# Patient Record
Sex: MALE | Race: WHITE | NOT HISPANIC OR LATINO | Employment: OTHER | URBAN - METROPOLITAN AREA
[De-identification: names, ages, dates, MRNs, and addresses within clinical notes are randomized per-mention and may not be internally consistent; named-entity substitution may affect disease eponyms.]

---

## 2017-02-14 ENCOUNTER — OFFICE VISIT (OUTPATIENT)
Dept: SURGERY | Facility: CLINIC | Age: 67
End: 2017-02-14

## 2017-02-14 VITALS — WEIGHT: 232.8 LBS | HEIGHT: 71 IN | BODY MASS INDEX: 32.59 KG/M2 | OXYGEN SATURATION: 98 % | HEART RATE: 68 BPM

## 2017-02-14 DIAGNOSIS — Z12.11 SCREENING FOR COLON CANCER: Primary | ICD-10-CM

## 2017-02-14 PROCEDURE — 99203 OFFICE O/P NEW LOW 30 MIN: CPT | Performed by: SURGERY

## 2017-02-23 NOTE — PROGRESS NOTES
CHIEF COMPLAINT:    Referred for screening colonoscopy.    HISTORY OF PRESENT ILLNESS:    Anibal Freedman is a 67 y.o. male who called the office to arrange for screening colonoscopy.  I reviewed his information packet and asked him to come see me for an appointment.    His last colonoscopy was performed by Dr. Mildred You.  The sigmoid colon biopsy was performed that showed vascular congestion.  There was a distal sigmoid polyp that was removed.  The pathology showed tubular adenoma with mild dysplasia.  He has abdominal distention and bloating.  There is no nausea.  There is no vomiting.  There is no blood in the stool.  There is no melena.  There is no diarrhea or constipation.    He has lower extremity swelling and shortness of breath.  There is no recent chest pain.    He has hypertension, coronary artery disease, congestive heart failure, and chronic atrial fibrillation.  He normally sees Dr. Tank Baumann.  He frequently comes to [facility].  When he does, usually sees Dr. Epifanio May.  He had a left heart catheterization on 8/2/2016.  He has moderate pulmonary hypertension with PA pressure 5525.  There is proximal and mid disease of about 50-60% in the RCA  There is 50% stenosis of the LAD.  The left main coronary artery is normal.    Past Medical History   Diagnosis Date   • Alcoholism    • Arthritis    • Atrial fibrillation      pt reported   • Cellulitis    • CHF (congestive heart failure)    • Colon polyps 08/21/2006     Colonoscopy w/ snare cautery, polypectomy & biopsy, PATH:  Sigmoid Colon Biopsy: focal vascular congestion & evidence of recent hemorrhage, non-specific.  Recto-Sigmoid Colon Biopsy: Fragments of tubular adenoam w/ mild dysplasia. Dr. Mildred You   • History of coronary angioplasty    • History of MRSA infection      pt reported   • History of staph infection 08/02/2005   • Hypertension    • Infectious viral hepatitis      pt reported   • Prostate cancer 04/28/2010      S/P Radical Prostectomy, Adenocarcinoma, Primary Yissel Grade 3, Secondar [place]re[place] Grade 3. Combined yissel score 6. Tumor involves rt & lt lobes, negative capsular margin, no invasion of seminal vesicles, no identified perineural invastion   • Withdrawal symptoms, alcohol        Past Surgical History   Procedure Laterality Date   • Appendectomy     • Coronary angioplasty with stent placement       x 2    • Joint replacement     • Skin biopsy     • Tonsillectomy     • Cardiac catheterization N/A 8/2/2016     Procedure: Left Heart Cath   ?right cath too;  Surgeon: Epifanio May MD;  Location:  [facility];  Service:    • Cardiac catheterization N/A 8/2/2016     Procedure: Coronary angiography;  Surgeon: Epifanio May MD;  Location:  [facility];  Service:    • Cardiac catheterization N/A 8/2/2016     Procedure: Left ventriculography;  Surgeon: Epifanio May MD;  Location:  [facility];  Service:    • Cardiac catheterization N/A 8/2/2016     Procedure: Right Heart Cath;  Surgeon: Epifanio May MD;  [facility];  Service:    • Cardiac ablation Right 01/18/2013     Atrial Flutter Ablation, Dr. Otis Srinivasan   • Cystotomy N/A 09/23/2011     Laparoscopic closure of incidental cystotomy, Laparoscopic incision of pelvic lymphocele, Dr. Jaime Royal   • Cystoscopy N/A 04/28/2010     Cystoscopy w/ transurethral incision of the bladder neck, Dr. ANDREW Cordova   • Total knee arthroplasty Left 03/25/2008     Left total knee arthroplasty w/ [place] pinless computer navigation, Dr. Ashok Crocker   • Colonoscopy w/ biopsies and polypectomy N/A 08/21/2006     Colonoscopy w/ snare cautery, polypectomy & biopsy, PATH:  Sigmoid Colon Biopsy: focal vascular congestion & evidence of recent hemorrhage, non-specific.  Recto-Sigmoid Colon Biopsy: Fragments of tubular adenoam w/ mild dysplasia. Dr. Mildred You   •  Prostatectomy N/A 04/28/2010     Adenocarcinoma, Primary [place] Grade 3, Secondar Yissel Grade 3. Combined yissel score 6. Tumor involves rt & lt lobes, negative capsular margin, no invasion of seminal vesicles, no identified perineural invastion, Dr. Jaime Royal   • Leg debridement Left 08/19/2005     Left Thigh, Debridement skin & subcutaneous tissue muscle w/ closure via advancement flaps, Dr. Mildred You   • Wound debridement Left 08/02/2005     Left Buttocks & Left thigh wounds, Debridement of left buttocks & left thigh wounds, Dr. Mildred You   • Leg debridement Left 08/08/2005     Debridement of left anterior thigh, Dr. Mildred You         Current Outpatient Prescriptions:   •  allopurinol (ZYLOPRIM) 300 MG tablet, Take 300 mg by mouth Daily., Disp: , Rfl:   •  apixaban (ELIQUIS) 5 MG tablet tablet, Take 5 mg by mouth 2 (two) times a day., Disp: , Rfl:   •  bumetanide (BUMEX) 2 MG tablet, Take 2 mg by mouth 2 (two) times a day., Disp: , Rfl:   •  ferrous sulfate 325 (65 FE) MG tablet, Take 325 mg by mouth Daily With Breakfast., Disp: , Rfl:   •  FLUoxetine (PROzac) 20 MG capsule, Take 20 mg by mouth daily., Disp: , Rfl:   •  hydrOXYzine (ATARAX) 25 MG tablet, Take 25 mg by mouth every 8 (eight) hours as needed for itching., Disp: , Rfl:   •  isosorbide mononitrate (IMDUR) 60 MG 24 hr tablet, Take 1 tablet by mouth daily., Disp: 30 tablet, Rfl: 0  •  magnesium oxide (MAG-OX) 400 MG tablet, Take 400 mg by mouth daily., Disp: , Rfl:   •  metoprolol tartrate (LOPRESSOR) 25 MG tablet, Take 1 tablet by mouth every 12 (twelve) hours., Disp: 60 tablet, Rfl: 0  •  pantoprazole (PROTONIX) 40 MG EC tablet, Take 40 mg by mouth daily., Disp: , Rfl:   •  potassium chloride (K-DUR,KLOR-CON) 20 MEQ CR tablet, Take 20 mEq by mouth 2 (Two) Times a Day., Disp: , Rfl:   •  predniSONE (DELTASONE) 10 MG tablet, Take 1 tablet by mouth daily with breakfast., Disp: 3 tablet, Rfl: 0    No Known Allergies    Family  "History   Problem Relation Age of Onset   • Heart disease Mother    • Alcohol abuse Father    • Liver cancer Father    • Cancer Sister    • Hypertension Brother    • Alcohol abuse Brother    • Skin cancer Brother        Social History     Social History   • Marital status:      Spouse name: N/A   • Number of children: N/A   • Years of education: N/A     Occupational History   • Not on file.     Social History Main Topics   • Smoking status: Former Smoker     Packs/day: 1.50   • Smokeless tobacco: Not on file   • Alcohol use 3.6 oz/week     6 Cans of beer per week      Comment: 1 pint a day OR MORE OF VODKA   • Drug use: No   • Sexual activity: Not Currently     Partners: Female     Other Topics Concern   • Not on file     Social History Narrative       Review of Systems   Respiratory: Positive for shortness of breath.    Cardiovascular: Positive for leg swelling.   Genitourinary: Positive for urgency.   Musculoskeletal: Positive for arthralgias and myalgias.   Allergic/Immunologic:        History of MRSA in 2005   All other systems reviewed and are negative.      Objective     Visit Vitals   • Pulse 68   • Ht 71\" (180.3 cm)   • Wt 232 lb 12.8 oz (106 kg)   • SpO2 98%   • BMI 32.47 kg/m2       Physical Exam   Constitutional: He is oriented to person, place, and time. He appears well-developed and well-nourished. No distress.   HENT:   Head: Normocephalic and atraumatic.   Eyes: Conjunctivae are normal. No scleral icterus.   Neck: JVD present.   Cardiovascular: Normal rate, regular rhythm, normal heart sounds and intact distal pulses.  Exam reveals no gallop.    No murmur heard.  Pulmonary/Chest: No respiratory distress. He has wheezes. He has no rales.   Respiratory effort is labored.  He appears short of breath with normal conversation.   Abdominal: Soft. Bowel sounds are normal. He exhibits no distension. There is no tenderness. There is no guarding.   Musculoskeletal: He exhibits edema. He exhibits no " deformity.   Neurological: He is alert and oriented to person, place, and time.   Skin: Skin is warm and dry.   Psychiatric: He has a normal mood and affect.   Nursing note and vitals reviewed.    DIAGNOSTIC DATA:    I reviewed an echocardiogram that was performed on 7/24 2016.  It shows an ejection fraction of 55.9%.  There was normal wall contractility.  I reviewed the report of a stress test performed on 7/27/2016 that was abnormal.  It shows an area of reversible ischemia involving the lateral wall.  I reviewed the report of a cardiac catheterization performed on 8/2/2016.  It shows moderate pulmonary hypertension with PA pressure 55/25.  There is proximal and mid disease of about 50-60% in the RCA  There is 50% stenosis of the LAD.  The left main coronary artery is normal.  Medical management is recommended.    ASSESSMENT:    Normal risk colon cancer screening.  Negative family history for colorectal cancer.  Prior history of removal of an adenomatous polyp of the sigmoid colon.    PLAN:    He has an appointment coming up to see his cardiologist.  Patient and I discussed the following: Colonoscopy, including polypectomy is a relatively low risk procedure, but it does require sedation and a bowel prep.  Within the last year, he has undergone cardiac evaluation that describes intermediate risk coronary disease and moderate pulmonary hypertension.  In the office today he seems significantly short of breath.  I feel like the potential downside to screening colonoscopy outweighs the possible benefits. I have not recommended pursuing colonoscopy.  He is going to get an opinion from his cardiologist regarding this.  He will call me after his visit with Dr. Baumann. I might consider FIT or Cologuard.    ADDENDUM:    Clearanceletter from Dr Baumann. Will arrange colonoscopy.

## 2017-03-09 ENCOUNTER — LAB (OUTPATIENT)
Dept: LAB | Facility: HOSPITAL | Age: 67
End: 2017-03-09
Attending: INTERNAL MEDICINE

## 2017-03-09 ENCOUNTER — TRANSCRIBE ORDERS (OUTPATIENT)
Dept: ADMINISTRATIVE | Facility: HOSPITAL | Age: 67
End: 2017-03-09

## 2017-03-09 DIAGNOSIS — D63.1 ANEMIA OF CHRONIC RENAL FAILURE, UNSPECIFIED STAGE: ICD-10-CM

## 2017-03-09 DIAGNOSIS — N18.30 CHRONIC KIDNEY DISEASE, STAGE III (MODERATE) (HCC): ICD-10-CM

## 2017-03-09 DIAGNOSIS — N18.9 ANEMIA OF CHRONIC RENAL FAILURE, UNSPECIFIED STAGE: ICD-10-CM

## 2017-03-09 DIAGNOSIS — E61.1 IRON DEFICIENCY: ICD-10-CM

## 2017-03-09 DIAGNOSIS — E55.9 VITAMIN D DEFICIENCY, UNSPECIFIED: ICD-10-CM

## 2017-03-09 DIAGNOSIS — M10.9 GOUT, UNSPECIFIED: Primary | ICD-10-CM

## 2017-03-09 DIAGNOSIS — M10.9 GOUT, UNSPECIFIED: ICD-10-CM

## 2017-03-09 LAB
25(OH)D3 SERPL-MCNC: 6.8 NG/ML (ref 30–100)
ANION GAP SERPL CALCULATED.3IONS-SCNC: 16.3 MMOL/L
BASOPHILS # BLD AUTO: 0.02 10*3/MM3 (ref 0–0.2)
BASOPHILS NFR BLD AUTO: 0.4 % (ref 0–1.5)
BILIRUB UR QL STRIP: NEGATIVE
BUN BLD-MCNC: 22 MG/DL (ref 8–23)
BUN/CREAT SERPL: 19.5 (ref 7–25)
CALCIUM SPEC-SCNC: 9.6 MG/DL (ref 8.6–10.5)
CHLORIDE SERPL-SCNC: 102 MMOL/L (ref 98–107)
CLARITY UR: CLEAR
CO2 SERPL-SCNC: 26.7 MMOL/L (ref 22–29)
COLOR UR: YELLOW
CREAT BLD-MCNC: 1.13 MG/DL (ref 0.76–1.27)
DEPRECATED RDW RBC AUTO: 56.9 FL (ref 37–54)
EOSINOPHIL # BLD AUTO: 0.06 10*3/MM3 (ref 0–0.7)
EOSINOPHIL NFR BLD AUTO: 1.1 % (ref 0.3–6.2)
ERYTHROCYTE [DISTWIDTH] IN BLOOD BY AUTOMATED COUNT: 16 % (ref 11.5–14.5)
FERRITIN SERPL-MCNC: 74.56 NG/ML (ref 30–400)
GFR SERPL CREATININE-BSD FRML MDRD: 65 ML/MIN/1.73
GLUCOSE BLD-MCNC: 108 MG/DL (ref 65–99)
GLUCOSE UR STRIP-MCNC: NEGATIVE MG/DL
HCT VFR BLD AUTO: 38.9 % (ref 40.4–52.2)
HGB BLD-MCNC: 12.6 G/DL (ref 13.7–17.6)
HGB UR QL STRIP.AUTO: NEGATIVE
IMM GRANULOCYTES # BLD: 0.02 10*3/MM3 (ref 0–0.03)
IMM GRANULOCYTES NFR BLD: 0.4 % (ref 0–0.5)
IRON 24H UR-MRATE: 141 MCG/DL (ref 59–158)
IRON SATN MFR SERPL: 40 % (ref 20–50)
KETONES UR QL STRIP: NEGATIVE
LEUKOCYTE ESTERASE UR QL STRIP.AUTO: NEGATIVE
LYMPHOCYTES # BLD AUTO: 0.65 10*3/MM3 (ref 0.9–4.8)
LYMPHOCYTES NFR BLD AUTO: 11.6 % (ref 19.6–45.3)
MAGNESIUM SERPL-MCNC: 1.6 MG/DL (ref 1.6–2.4)
MCH RBC QN AUTO: 31.4 PG (ref 27–32.7)
MCHC RBC AUTO-ENTMCNC: 32.4 G/DL (ref 32.6–36.4)
MCV RBC AUTO: 97 FL (ref 79.8–96.2)
MONOCYTES # BLD AUTO: 0.97 10*3/MM3 (ref 0.2–1.2)
MONOCYTES NFR BLD AUTO: 17.4 % (ref 5–12)
NEUTROPHILS # BLD AUTO: 3.86 10*3/MM3 (ref 1.9–8.1)
NEUTROPHILS NFR BLD AUTO: 69.1 % (ref 42.7–76)
NITRITE UR QL STRIP: NEGATIVE
PH UR STRIP.AUTO: 7 [PH] (ref 5–8)
PLATELET # BLD AUTO: 181 10*3/MM3 (ref 140–500)
PMV BLD AUTO: 12.2 FL (ref 6–12)
POTASSIUM BLD-SCNC: 4.5 MMOL/L (ref 3.5–5.2)
PROT UR QL STRIP: NEGATIVE
RBC # BLD AUTO: 4.01 10*6/MM3 (ref 4.6–6)
SODIUM BLD-SCNC: 145 MMOL/L (ref 136–145)
SP GR UR STRIP: <=1.005 (ref 1–1.03)
TIBC SERPL-MCNC: 352 MCG/DL (ref 298–536)
TRANSFERRIN SERPL-MCNC: 236 MG/DL (ref 200–360)
UROBILINOGEN UR QL STRIP: NORMAL
WBC NRBC COR # BLD: 5.58 10*3/MM3 (ref 4.5–10.7)

## 2017-03-09 PROCEDURE — 83540 ASSAY OF IRON: CPT

## 2017-03-09 PROCEDURE — 81003 URINALYSIS AUTO W/O SCOPE: CPT

## 2017-03-09 PROCEDURE — 36415 COLL VENOUS BLD VENIPUNCTURE: CPT

## 2017-03-09 PROCEDURE — 83735 ASSAY OF MAGNESIUM: CPT

## 2017-03-09 PROCEDURE — 84466 ASSAY OF TRANSFERRIN: CPT

## 2017-03-09 PROCEDURE — 82728 ASSAY OF FERRITIN: CPT

## 2017-03-09 PROCEDURE — 80048 BASIC METABOLIC PNL TOTAL CA: CPT

## 2017-03-09 PROCEDURE — 85025 COMPLETE CBC W/AUTO DIFF WBC: CPT

## 2017-03-09 PROCEDURE — 82306 VITAMIN D 25 HYDROXY: CPT

## 2017-03-15 ENCOUNTER — ANESTHESIA (OUTPATIENT)
Dept: GASTROENTEROLOGY | Facility: HOSPITAL | Age: 67
End: 2017-03-15

## 2017-03-15 ENCOUNTER — ANESTHESIA EVENT (OUTPATIENT)
Dept: GASTROENTEROLOGY | Facility: HOSPITAL | Age: 67
End: 2017-03-15

## 2017-03-15 ENCOUNTER — HOSPITAL ENCOUNTER (OUTPATIENT)
Facility: HOSPITAL | Age: 67
Setting detail: HOSPITAL OUTPATIENT SURGERY
Discharge: HOME | End: 2017-03-15
Attending: SURGERY | Admitting: SURGERY

## 2017-03-15 VITALS
HEIGHT: 71 IN | WEIGHT: 236.06 LBS | OXYGEN SATURATION: 92 % | TEMPERATURE: 97.7 F | DIASTOLIC BLOOD PRESSURE: 76 MMHG | BODY MASS INDEX: 33.05 KG/M2 | SYSTOLIC BLOOD PRESSURE: 141 MMHG | RESPIRATION RATE: 18 BRPM | HEART RATE: 79 BPM

## 2017-03-15 DIAGNOSIS — Z12.11 SCREENING FOR COLON CANCER: ICD-10-CM

## 2017-03-15 PROCEDURE — S0260 H&P FOR SURGERY: HCPCS | Performed by: SURGERY

## 2017-03-15 PROCEDURE — 88305 TISSUE EXAM BY PATHOLOGIST: CPT | Performed by: SURGERY

## 2017-03-15 PROCEDURE — 45380 COLONOSCOPY AND BIOPSY: CPT | Performed by: SURGERY

## 2017-03-15 PROCEDURE — 25010000002 PROPOFOL 10 MG/ML EMULSION: Performed by: ANESTHESIOLOGY

## 2017-03-15 RX ORDER — PROPOFOL 10 MG/ML
VIAL (ML) INTRAVENOUS CONTINUOUS PRN
Status: DISCONTINUED | OUTPATIENT
Start: 2017-03-15 | End: 2017-03-15 | Stop reason: SURG

## 2017-03-15 RX ORDER — LIDOCAINE HYDROCHLORIDE 20 MG/ML
INJECTION, SOLUTION INFILTRATION; PERINEURAL AS NEEDED
Status: DISCONTINUED | OUTPATIENT
Start: 2017-03-15 | End: 2017-03-15 | Stop reason: SURG

## 2017-03-15 RX ORDER — SODIUM CHLORIDE 0.9 % (FLUSH) 0.9 %
3 SYRINGE (ML) INJECTION AS NEEDED
Status: DISCONTINUED | OUTPATIENT
Start: 2017-03-15 | End: 2017-03-15 | Stop reason: HOSPADM

## 2017-03-15 RX ORDER — SODIUM CHLORIDE, SODIUM LACTATE, POTASSIUM CHLORIDE, CALCIUM CHLORIDE 600; 310; 30; 20 MG/100ML; MG/100ML; MG/100ML; MG/100ML
1000 INJECTION, SOLUTION INTRAVENOUS CONTINUOUS PRN
Status: DISCONTINUED | OUTPATIENT
Start: 2017-03-15 | End: 2017-03-15 | Stop reason: HOSPADM

## 2017-03-15 RX ORDER — LIDOCAINE HYDROCHLORIDE 10 MG/ML
0.5 INJECTION, SOLUTION INFILTRATION; PERINEURAL ONCE AS NEEDED
Status: DISCONTINUED | OUTPATIENT
Start: 2017-03-15 | End: 2017-03-15 | Stop reason: HOSPADM

## 2017-03-15 RX ORDER — PROPOFOL 10 MG/ML
VIAL (ML) INTRAVENOUS AS NEEDED
Status: DISCONTINUED | OUTPATIENT
Start: 2017-03-15 | End: 2017-03-15 | Stop reason: SURG

## 2017-03-15 RX ADMIN — LIDOCAINE HYDROCHLORIDE 60 MG: 20 INJECTION, SOLUTION INFILTRATION; PERINEURAL at 10:51

## 2017-03-15 RX ADMIN — SODIUM CHLORIDE, POTASSIUM CHLORIDE, SODIUM LACTATE AND CALCIUM CHLORIDE: 600; 310; 30; 20 INJECTION, SOLUTION INTRAVENOUS at 10:45

## 2017-03-15 RX ADMIN — SODIUM CHLORIDE, POTASSIUM CHLORIDE, SODIUM LACTATE AND CALCIUM CHLORIDE 1000 ML: 600; 310; 30; 20 INJECTION, SOLUTION INTRAVENOUS at 08:53

## 2017-03-15 RX ADMIN — PROPOFOL 150 MG: 10 INJECTION, EMULSION INTRAVENOUS at 10:51

## 2017-03-15 RX ADMIN — PROPOFOL 150 MCG/KG/MIN: 10 INJECTION, EMULSION INTRAVENOUS at 10:52

## 2017-03-15 NOTE — OP NOTE
Preoperative diagnosis:   Colon cancer screening.  History of polyps.  Postoperative diagnosis:   Ascending colon polyp.  Diverticulosis.  Procedure: Colonoscopy with biopsy.  Attending surgeon: Anibal Bower M.D.  Anesthesia: MAC.  Specimens: Colon polyp.  Blood loss: 5 mL.  Drains: None.  Bowel prep: Excellent.  Scope withdrawal time: 7:27.  Indications: Anibal Freedman is a 67 y.o. male who is asymptomatic, and has a negative immediate family history.  He has a history of prior colonoscopy requiring polypectomy. he presents for screening colonoscopy.    Description of procedure: he is taken to the endoscopy suite and positioned on the stretcher in the left lateral decubitus position.  After graduated amounts of propofol were administered, a digital rectal exam was performed.  It was normal.    The flexible colonoscope was inserted into the anus and advanced to the level of the cecum without difficulty. The appendiceal orifice was identified and photographed.   The ileocecal valve was identified.  It appeared normal.  The intraluminal surface of the colon was examined upon withdrawal scope.    During the exam, a small (3 mm) polyp was identified in the ascending colon.  It was removed with the cold biopsy forceps.  There is no residual tissue.  Hemostasis was spontaneous, and occurred within a few seconds.      The bowel prep was excellent.  There was some clear liquid within all segments of the colon that was easily evacuated with the suction channel the scope.  There were multiple diverticuli. Te diverticuli were present in all segments of the colon, but were much more numerous in the descending solon and sigmoid colon. There were no additional polyps.  There were no vascular malformations.  A retroflexed view in the rectum revealed no hemorrhoids.    He experienced oxygen desaturation noted on pulse oxymetry beginning at the point in which the ascending colon polyp was biopsied. The saturations decreased  to 65%, but returned to above 90% within a minute after changing supplemental oxygen to a facemask. He was returned to the recovery area in stable condition.    I expect to not arrange for further screening exams.

## 2017-03-15 NOTE — ANESTHESIA POSTPROCEDURE EVALUATION
Patient: Anibal Freedman    Procedure Summary     Date Anesthesia Start Anesthesia Stop Room / Location    03/15/17 1045 1127  [facility] /  [facility]       Procedure Diagnosis Surgeon Provider    COLONOSCOPY TO CECUM WITH COLD BIOPSY POLYECTOMY (N/A ) Screening for colon cancer  (Screening for colon cancer [Z12.11]) MD Donte Velazquez MD          Anesthesia Type: MAC  Last vitals  /71 (03/15/17 1141)    Temp      Pulse 84 (03/15/17 1141)   Resp 18 (03/15/17 1141)    SpO2 92 % (03/15/17 1141)      Post Anesthesia Care and Evaluation    Patient location during evaluation: PACU  Patient participation: complete - patient participated  Level of consciousness: awake and alert  Pain score: 0  Pain management: adequate  Airway patency: patent  Anesthetic complications: No anesthetic complications    Cardiovascular status: acceptable  Respiratory status: acceptable  Hydration status: acceptable

## 2017-03-15 NOTE — PLAN OF CARE
Problem: Patient Care Overview (Adult)  Goal: Plan of Care Review  Outcome: Ongoing (interventions implemented as appropriate)    03/15/17 0819   Coping/Psychosocial Response Interventions   Plan Of Care Reviewed With patient   Patient Care Overview   Progress improving       Goal: Adult Individualization and Mutuality  Outcome: Ongoing (interventions implemented as appropriate)  Goal: Discharge Needs Assessment  Outcome: Ongoing (interventions implemented as appropriate)    03/15/17 0819   Discharge Needs Assessment   Concerns To Be Addressed no discharge needs identified   Discharge Disposition home or self-care   Living Environment   Transportation Available car;family or friend will provide         Problem: GI Endoscopy (Adult)  Intervention: Monitor/Manage Procedure Recovery    03/15/17 0819   Respiratory Interventions   Airway/Ventilation Management airway patency maintained   Coping/Psychosocial Interventions   Environmental Support calm environment promoted   Activity   Activity Type activity adjusted per tolerance   Cardiac Interventions   Warming Thermoregulation Maintenance warm blankets applied       Intervention: Prevent [place]-procedural Injury    03/15/17 0819   Positioning   Positioning side lying, left   Head Of Bed (HOB) Position HOB elevated         Goal: Signs and Symptoms of Listed Potential Problems Will be Absent or Manageable (GI Endoscopy)  Outcome: Ongoing (interventions implemented as appropriate)    03/15/17 0819   GI Endoscopy   Problems Assessed (GI Endoscopy) all   Problems Present (GI Endoscopy) none

## 2017-03-15 NOTE — ANESTHESIA PREPROCEDURE EVALUATION
Anesthesia Evaluation     Patient summary reviewed and Nursing notes reviewed      Airway   Mallampati: II  TM distance: >3 FB  Neck ROM: full  no difficulty expected  Dental - normal exam     Pulmonary - normal exam   (+) a smoker Former,   Cardiovascular     Rhythm: irregular  Rate: normal    (+) hypertension, CAD, cardiac stents more than 12 months ago dysrhythmias Atrial Fib, CHF,     PE comment: Afib    Neuro/Psych- negative ROS  GI/Hepatic/Renal/Endo    (+) obesity,  hepatitis, liver disease,     Musculoskeletal (-) negative ROS    Abdominal  - normal exam    Bowel sounds: normal.   Substance History   (+) alcohol use,      OB/GYN negative ob/gyn ROS         Other                                    Anesthesia Plan    ASA 3     MAC     intravenous induction   Anesthetic plan and risks discussed with patient.

## 2017-03-15 NOTE — H&P
CHIEF COMPLAINT:     Referred for screening colonoscopy.     HISTORY OF PRESENT ILLNESS:     Anibal Freedman is a 67 y.o. male who called the office to arrange for screening colonoscopy. I reviewed his information packet and asked him to come see me for an appointment.     His last colonoscopy was performed by Dr. Mildred You. The sigmoid colon biopsy was performed that showed vascular congestion. There was a distal sigmoid polyp that was removed. The pathology showed tubular adenoma with mild dysplasia. He has abdominal distention and bloating. There is no nausea. There is no vomiting. There is no blood in the stool. There is no melena. There is no diarrhea or constipation.     He has lower extremity swelling and shortness of breath. There is no recent chest pain.     He has hypertension, coronary artery disease, congestive heart failure, and chronic atrial fibrillation. He normally sees Dr. Tank Baumann.  He frequently comes to [facility]. When he does, usually sees Dr. Epifanio May. He had a left heart catheterization on 8/2/2016. He has moderate pulmonary hypertension with PA pressure 5525. There is proximal and mid disease of about 50-60% in the RCA There is 50% stenosis of the LAD. The left main coronary artery is normal.      Medical History          Past Medical History   Diagnosis Date   • Alcoholism     • Arthritis     • Atrial fibrillation         pt reported   • Cellulitis     • CHF (congestive heart failure)     • Colon polyps 08/21/2006       Colonoscopy w/ snare cautery, polypectomy & biopsy, PATH: Sigmoid Colon Biopsy: focal vascular congestion & evidence of recent hemorrhage, non-specific. Recto-Sigmoid Colon Biopsy: Fragments of tubular adenoam w/ mild dysplasia. Dr. Mildred You   • History of coronary angioplasty     • History of MRSA infection         pt reported   • History of staph infection 08/02/2005   • Hypertension     • Infectious viral hepatitis         pt reported   •  Prostate cancer 04/28/2010       S/P Radical Prostectomy, Adenocarcinoma, Primary [place] Grade 3, Secondar Yissel Grade 3. Combined yissel score 6. Tumor involves rt & lt lobes, negative capsular margin, no invasion of seminal vesicles, no identified perineural invastion   • Withdrawal symptoms, alcohol               Surgical History           Past Surgical History   Procedure Laterality Date   • Appendectomy       • Coronary angioplasty with stent placement           x 2    • Joint replacement       • Skin biopsy       • Tonsillectomy       • Cardiac catheterization N/A 8/2/2016       Procedure: Left Heart Cath ?right cath too; Surgeon: Epifanio May MD; Location:  [facility]; Service:    • Cardiac catheterization N/A 8/2/2016       Procedure: Coronary angiography; Surgeon: Epifanio May MD; Location:  [facility]; Service:    • Cardiac catheterization N/A 8/2/2016       Procedure: Left ventriculography; Surgeon: Epifanio May MD; Location:  [facility]; Service:    • Cardiac catheterization N/A 8/2/2016       Procedure: Right Heart Cath; Surgeon: Epifanio May MD; Location:  [facility]; Service:    • Cardiac ablation Right 01/18/2013       Atrial Flutter Ablation, Dr. Otis Srinivasan   • Cystotomy N/A 09/23/2011       Laparoscopic closure of incidental cystotomy, Laparoscopic incision of pelvic lymphocele, Dr. Jaime Royal   • Cystoscopy N/A 04/28/2010       Cystoscopy w/ transurethral incision of the bladder neck, Dr. ANDREW Cordova   • Total knee arthroplasty Left 03/25/2008       Left total knee arthroplasty w/ [place] pinless computer navigation, Dr. Ashok Crocker   • Colonoscopy w/ biopsies and polypectomy N/A 08/21/2006       Colonoscopy w/ snare cautery, polypectomy & biopsy, PATH: Sigmoid Colon Biopsy: focal vascular congestion & evidence of recent hemorrhage, non-specific. Recto-Sigmoid Colon Biopsy:  Fragments of tubular adenoam w/ mild dysplasia. Dr. Mildred You   • Prostatectomy N/A 04/28/2010       Adenocarcinoma, Primary [place]iss[place] Grade 3, Secondar Yissel Grade 3. Combined yissel score 6. Tumor involves rt & lt lobes, negative capsular margin, no invasion of seminal vesicles, no identified perineural invastion, Dr. Jaime Royal   • Leg debridement Left 08/19/2005       Left Thigh, Debridement skin & subcutaneous tissue muscle w/ closure via advancement flaps, Dr. Mildred You   • Wound debridement Left 08/02/2005       Left Buttocks & Left thigh wounds, Debridement of left buttocks & left thigh wounds, Dr. Mildred You   • Leg debridement Left 08/08/2005       Debridement of left anterior thigh, Dr. Mildred You               Current Outpatient Prescriptions:   • allopurinol (ZYLOPRIM) 300 MG tablet, Take 300 mg by mouth Daily., Disp: , Rfl:   • apixaban (ELIQUIS) 5 MG tablet tablet, Take 5 mg by mouth 2 (two) times a day., Disp: , Rfl:   • bumetanide (BUMEX) 2 MG tablet, Take 2 mg by mouth 2 (two) times a day., Disp: , Rfl:   • ferrous sulfate 325 (65 FE) MG tablet, Take 325 mg by mouth Daily With Breakfast., Disp: , Rfl:   • FLUoxetine (PROzac) 20 MG capsule, Take 20 mg by mouth daily., Disp: , Rfl:   • hydrOXYzine (ATARAX) 25 MG tablet, Take 25 mg by mouth every 8 (eight) hours as needed for itching., Disp: , Rfl:   • isosorbide mononitrate (IMDUR) 60 MG 24 hr tablet, Take 1 tablet by mouth daily., Disp: 30 tablet, Rfl: 0  • magnesium oxide (MAG-OX) 400 MG tablet, Take 400 mg by mouth daily., Disp: , Rfl:   • metoprolol tartrate (LOPRESSOR) 25 MG tablet, Take 1 tablet by mouth every 12 (twelve) hours., Disp: 60 tablet, Rfl: 0  • pantoprazole (PROTONIX) 40 MG EC tablet, Take 40 mg by mouth daily., Disp: , Rfl:   • potassium chloride (K-DUR,KLOR-CON) 20 MEQ CR tablet, Take 20 mEq by mouth 2 (Two) Times a Day., Disp: , Rfl:   • predniSONE (DELTASONE) 10 MG tablet, Take 1 tablet by mouth daily with  "breakfast., Disp: 3 tablet, Rfl: 0     No Known Allergies           Family History   Problem Relation Age of Onset   • Heart disease Mother     • Alcohol abuse Father     • Liver cancer Father     • Cancer Sister     • Hypertension Brother     • Alcohol abuse Brother     • Skin cancer Brother            Social History    Social History            Social History   • Marital status:        Spouse name: N/A   • Number of children: N/A   • Years of education: N/A          Occupational History   • Not on file.              Social History Main Topics   • Smoking status: Former Smoker       Packs/day: 1.50   • Smokeless tobacco: Not on file   • Alcohol use 3.6 oz/week        6 Cans of beer per week          Comment: 1 pint a day OR MORE OF VODKA   • Drug use: No   • Sexual activity: Not Currently       Partners: Female           Other Topics Concern   • Not on file      Social History Narrative            Review of Systems   Respiratory: Positive for shortness of breath.   Cardiovascular: Positive for leg swelling.   Genitourinary: Positive for urgency.   Musculoskeletal: Positive for arthralgias and myalgias.   Allergic/Immunologic:   History of MRSA in 2005   All other systems reviewed and are negative.        Objective             Visit Vitals   • Pulse 68   • Ht 71\" (180.3 cm)   • Wt 232 lb 12.8 oz (106 kg)   • SpO2 98%   • BMI 32.47 kg/m2         Physical Exam   Constitutional: He is oriented to person, place, and time. He appears well-developed and well-nourished. No distress.   HENT:   Head: Normocephalic and atraumatic.   Eyes: Conjunctivae are normal. No scleral icterus.   Neck: JVD present.   Cardiovascular: Normal rate, regular rhythm, normal heart sounds and intact distal pulses. Exam reveals no gallop.   No murmur heard.  Pulmonary/Chest: No respiratory distress. He has wheezes. He has no rales.   Respiratory effort is labored. He appears short of breath with normal conversation.   Abdominal: Soft. " Bowel sounds are normal. He exhibits no distension. There is no tenderness. There is no guarding.   Musculoskeletal: He exhibits edema. He exhibits no deformity.   Neurological: He is alert and oriented to person, place, and time.   Skin: Skin is warm and dry.   Psychiatric: He has a normal mood and affect.   Nursing note and vitals reviewed.     DIAGNOSTIC DATA:     I reviewed an echocardiogram that was performed on 7/24 2016. It shows an ejection fraction of 55.9%. There was normal wall contractility.  I reviewed the report of a stress test performed on 7/27/2016 that was abnormal. It shows an area of reversible ischemia involving the lateral wall.  I reviewed the report of a cardiac catheterization performed on 8/2/2016. It shows moderate pulmonary hypertension with PA pressure 55/25. There is proximal and mid disease of about 50-60% in the RCA There is 50% stenosis of the LAD. The left main coronary artery is normal. Medical management is recommended.     ASSESSMENT:     Colon cancer screening.  Negative family history for colorectal cancer.  Prior history of removal of an adenomatous polyp of the sigmoid colon.     PLAN:     He had an appointment coming up to see his cardiologist.  Clearance letter from Dr Baumann received.   Arranged for colonoscopy.

## 2017-03-16 LAB
CYTO UR: NORMAL
LAB AP CASE REPORT: NORMAL
Lab: NORMAL
PATH REPORT.FINAL DX SPEC: NORMAL
PATH REPORT.GROSS SPEC: NORMAL

## 2017-03-22 ENCOUNTER — DOCUMENTATION (OUTPATIENT)
Dept: SURGERY | Facility: CLINIC | Age: 67
End: 2017-03-22

## 2017-03-22 NOTE — PROGRESS NOTES
I called him today to report his polypectomy result. It was a tubular adenoma with low-grade dysplasia. As such, the next recommended screening colonoscopy would be scheduled in 5 years, but because of his co-morbidities, I have recommended that no additional routine screening exams should be arranged.

## 2017-05-12 ENCOUNTER — TRANSCRIBE ORDERS (OUTPATIENT)
Dept: ADMINISTRATIVE | Facility: HOSPITAL | Age: 67
End: 2017-05-12

## 2017-05-12 ENCOUNTER — LAB (OUTPATIENT)
Dept: LAB | Facility: HOSPITAL | Age: 67
End: 2017-05-12
Attending: INTERNAL MEDICINE

## 2017-05-12 ENCOUNTER — LAB (OUTPATIENT)
Dept: LAB | Facility: HOSPITAL | Age: 67
End: 2017-05-12

## 2017-05-12 DIAGNOSIS — N18.30 CHRONIC KIDNEY DISEASE, STAGE III (MODERATE) (HCC): ICD-10-CM

## 2017-05-12 DIAGNOSIS — M10.9 GOUT, UNSPECIFIED: ICD-10-CM

## 2017-05-12 DIAGNOSIS — M10.9 GOUT, UNSPECIFIED: Primary | ICD-10-CM

## 2017-05-12 DIAGNOSIS — N18.30 CHRONIC KIDNEY DISEASE, STAGE III (MODERATE) (HCC): Primary | ICD-10-CM

## 2017-05-12 LAB
ANION GAP SERPL CALCULATED.3IONS-SCNC: 17.2 MMOL/L
BUN BLD-MCNC: 27 MG/DL (ref 8–23)
BUN/CREAT SERPL: 24.5 (ref 7–25)
CALCIUM SPEC-SCNC: 8.8 MG/DL (ref 8.6–10.5)
CHLORIDE SERPL-SCNC: 92 MMOL/L (ref 98–107)
CO2 SERPL-SCNC: 30.8 MMOL/L (ref 22–29)
CREAT BLD-MCNC: 1.1 MG/DL (ref 0.76–1.27)
GFR SERPL CREATININE-BSD FRML MDRD: 67 ML/MIN/1.73
GLUCOSE BLD-MCNC: 108 MG/DL (ref 65–99)
MAGNESIUM SERPL-MCNC: 1.4 MG/DL (ref 1.6–2.4)
POTASSIUM BLD-SCNC: 3.4 MMOL/L (ref 3.5–5.2)
SODIUM BLD-SCNC: 140 MMOL/L (ref 136–145)
URATE SERPL-MCNC: 7.9 MG/DL (ref 3.4–7)

## 2017-05-12 PROCEDURE — 83735 ASSAY OF MAGNESIUM: CPT

## 2017-05-12 PROCEDURE — 36415 COLL VENOUS BLD VENIPUNCTURE: CPT

## 2017-05-12 PROCEDURE — 80048 BASIC METABOLIC PNL TOTAL CA: CPT

## 2017-05-12 PROCEDURE — 84550 ASSAY OF BLOOD/URIC ACID: CPT

## 2018-03-07 ENCOUNTER — APPOINTMENT (OUTPATIENT)
Dept: GENERAL RADIOLOGY | Facility: HOSPITAL | Age: 68
End: 2018-03-07

## 2018-03-07 ENCOUNTER — HOSPITAL ENCOUNTER (INPATIENT)
Facility: HOSPITAL | Age: 68
LOS: 7 days | Discharge: HOME | End: 2018-03-14
Attending: EMERGENCY MEDICINE | Admitting: HOSPITALIST

## 2018-03-07 ENCOUNTER — APPOINTMENT (OUTPATIENT)
Dept: CT IMAGING | Facility: HOSPITAL | Age: 68
End: 2018-03-07

## 2018-03-07 DIAGNOSIS — R26.9 GAIT ABNORMALITY: ICD-10-CM

## 2018-03-07 DIAGNOSIS — R77.8 ELEVATED TROPONIN: ICD-10-CM

## 2018-03-07 DIAGNOSIS — I50.9 ACUTE ON CHRONIC CONGESTIVE HEART FAILURE, UNSPECIFIED CONGESTIVE HEART FAILURE TYPE: ICD-10-CM

## 2018-03-07 DIAGNOSIS — D68.9 COAGULOPATHY (HCC): ICD-10-CM

## 2018-03-07 DIAGNOSIS — I48.91 ATRIAL FIBRILLATION, UNSPECIFIED TYPE (HCC): ICD-10-CM

## 2018-03-07 DIAGNOSIS — R18.8 ASCITES OF LIVER: Primary | ICD-10-CM

## 2018-03-07 PROBLEM — I10 HYPERTENSION: Status: ACTIVE | Noted: 2018-03-07

## 2018-03-07 PROBLEM — I50.810 RIGHT HEART FAILURE (HCC): Status: ACTIVE | Noted: 2018-03-07

## 2018-03-07 PROBLEM — K76.9 CHRONIC LIVER DISEASE: Status: ACTIVE | Noted: 2018-03-07

## 2018-03-07 PROBLEM — N18.9 CKD (CHRONIC KIDNEY DISEASE): Status: ACTIVE | Noted: 2018-03-07

## 2018-03-07 PROBLEM — E11.9 DM2 (DIABETES MELLITUS, TYPE 2) (HCC): Status: ACTIVE | Noted: 2018-03-07

## 2018-03-07 PROBLEM — F10.20 ALCOHOLISM (HCC): Status: ACTIVE | Noted: 2018-03-07

## 2018-03-07 PROBLEM — Z98.61 HISTORY OF CORONARY ANGIOPLASTY: Status: ACTIVE | Noted: 2018-03-07

## 2018-03-07 PROBLEM — D61.818 PANCYTOPENIA (HCC): Status: ACTIVE | Noted: 2018-03-07

## 2018-03-07 LAB
ALBUMIN SERPL-MCNC: 3.8 G/DL (ref 3.5–5.2)
ALBUMIN/GLOB SERPL: 1.3 G/DL
ALP SERPL-CCNC: 98 U/L (ref 39–117)
ALT SERPL W P-5'-P-CCNC: 8 U/L (ref 1–41)
ANION GAP SERPL CALCULATED.3IONS-SCNC: 14 MMOL/L
APTT PPP: 29.7 SECONDS (ref 22.7–35.4)
AST SERPL-CCNC: 19 U/L (ref 1–40)
BASOPHILS # BLD AUTO: 0 10*3/MM3 (ref 0–0.2)
BASOPHILS NFR BLD AUTO: 0 % (ref 0–1.5)
BILIRUB SERPL-MCNC: 1.5 MG/DL (ref 0.1–1.2)
BUN BLD-MCNC: 25 MG/DL (ref 8–23)
BUN/CREAT SERPL: 18.5 (ref 7–25)
CALCIUM SPEC-SCNC: 8.7 MG/DL (ref 8.6–10.5)
CHLORIDE SERPL-SCNC: 93 MMOL/L (ref 98–107)
CO2 SERPL-SCNC: 34 MMOL/L (ref 22–29)
CREAT BLD-MCNC: 1.35 MG/DL (ref 0.76–1.27)
DEPRECATED RDW RBC AUTO: 48.4 FL (ref 37–54)
EOSINOPHIL # BLD AUTO: 0.01 10*3/MM3 (ref 0–0.7)
EOSINOPHIL NFR BLD AUTO: 0.3 % (ref 0.3–6.2)
ERYTHROCYTE [DISTWIDTH] IN BLOOD BY AUTOMATED COUNT: 13.8 % (ref 11.5–14.5)
GFR SERPL CREATININE-BSD FRML MDRD: 52 ML/MIN/1.73
GLOBULIN UR ELPH-MCNC: 3 GM/DL
GLUCOSE BLD-MCNC: 90 MG/DL (ref 65–99)
HCT VFR BLD AUTO: 34.4 % (ref 40.4–52.2)
HGB BLD-MCNC: 11.2 G/DL (ref 13.7–17.6)
IMM GRANULOCYTES # BLD: 0 10*3/MM3 (ref 0–0.03)
IMM GRANULOCYTES NFR BLD: 0 % (ref 0–0.5)
INR PPP: 1.31 (ref 0.9–1.1)
LYMPHOCYTES # BLD AUTO: 0.59 10*3/MM3 (ref 0.9–4.8)
LYMPHOCYTES NFR BLD AUTO: 15.1 % (ref 19.6–45.3)
MAGNESIUM SERPL-MCNC: 0.9 MG/DL (ref 1.6–2.4)
MAGNESIUM SERPL-MCNC: 0.9 MG/DL (ref 1.6–2.4)
MCH RBC QN AUTO: 31.3 PG (ref 27–32.7)
MCHC RBC AUTO-ENTMCNC: 32.6 G/DL (ref 32.6–36.4)
MCV RBC AUTO: 96.1 FL (ref 79.8–96.2)
MONOCYTES # BLD AUTO: 0.68 10*3/MM3 (ref 0.2–1.2)
MONOCYTES NFR BLD AUTO: 17.4 % (ref 5–12)
NEUTROPHILS # BLD AUTO: 2.62 10*3/MM3 (ref 1.9–8.1)
NEUTROPHILS NFR BLD AUTO: 67.2 % (ref 42.7–76)
NT-PROBNP SERPL-MCNC: ABNORMAL PG/ML (ref 5–900)
PLATELET # BLD AUTO: 114 10*3/MM3 (ref 140–500)
PMV BLD AUTO: 10.8 FL (ref 6–12)
POTASSIUM BLD-SCNC: 2.8 MMOL/L (ref 3.5–5.2)
PROT SERPL-MCNC: 6.8 G/DL (ref 6–8.5)
PROTHROMBIN TIME: 16 SECONDS (ref 11.7–14.2)
RBC # BLD AUTO: 3.58 10*6/MM3 (ref 4.6–6)
SODIUM BLD-SCNC: 141 MMOL/L (ref 136–145)
TROPONIN T SERPL-MCNC: 0.04 NG/ML (ref 0–0.03)
TROPONIN T SERPL-MCNC: 0.05 NG/ML (ref 0–0.03)
WBC NRBC COR # BLD: 3.9 10*3/MM3 (ref 4.5–10.7)

## 2018-03-07 PROCEDURE — 83880 ASSAY OF NATRIURETIC PEPTIDE: CPT | Performed by: PHYSICIAN ASSISTANT

## 2018-03-07 PROCEDURE — 36415 COLL VENOUS BLD VENIPUNCTURE: CPT | Performed by: PHYSICIAN ASSISTANT

## 2018-03-07 PROCEDURE — 93010 ELECTROCARDIOGRAM REPORT: CPT | Performed by: INTERNAL MEDICINE

## 2018-03-07 PROCEDURE — 25010000002 THIAMINE PER 100 MG: Performed by: HOSPITALIST

## 2018-03-07 PROCEDURE — 80053 COMPREHEN METABOLIC PANEL: CPT | Performed by: PHYSICIAN ASSISTANT

## 2018-03-07 PROCEDURE — 85730 THROMBOPLASTIN TIME PARTIAL: CPT | Performed by: EMERGENCY MEDICINE

## 2018-03-07 PROCEDURE — 99219 PR INITIAL OBSERVATION CARE/DAY 50 MINUTES: CPT | Performed by: INTERNAL MEDICINE

## 2018-03-07 PROCEDURE — 25010000002 MAGNESIUM SULFATE IN D5W 1G/100ML (PREMIX) 1-5 GM/100ML-% SOLUTION: Performed by: INTERNAL MEDICINE

## 2018-03-07 PROCEDURE — 93005 ELECTROCARDIOGRAM TRACING: CPT | Performed by: EMERGENCY MEDICINE

## 2018-03-07 PROCEDURE — 85610 PROTHROMBIN TIME: CPT | Performed by: EMERGENCY MEDICINE

## 2018-03-07 PROCEDURE — 83735 ASSAY OF MAGNESIUM: CPT | Performed by: EMERGENCY MEDICINE

## 2018-03-07 PROCEDURE — 85025 COMPLETE CBC W/AUTO DIFF WBC: CPT | Performed by: PHYSICIAN ASSISTANT

## 2018-03-07 PROCEDURE — 84484 ASSAY OF TROPONIN QUANT: CPT | Performed by: EMERGENCY MEDICINE

## 2018-03-07 PROCEDURE — 71046 X-RAY EXAM CHEST 2 VIEWS: CPT

## 2018-03-07 PROCEDURE — 74176 CT ABD & PELVIS W/O CONTRAST: CPT

## 2018-03-07 PROCEDURE — 84484 ASSAY OF TROPONIN QUANT: CPT | Performed by: NURSE PRACTITIONER

## 2018-03-07 PROCEDURE — 93005 ELECTROCARDIOGRAM TRACING: CPT | Performed by: NURSE PRACTITIONER

## 2018-03-07 PROCEDURE — 99284 EMERGENCY DEPT VISIT MOD MDM: CPT

## 2018-03-07 PROCEDURE — 25010000002 MAGNESIUM SULFATE PER 500 MG OF MAGNESIUM: Performed by: HOSPITALIST

## 2018-03-07 PROCEDURE — 83735 ASSAY OF MAGNESIUM: CPT | Performed by: NURSE PRACTITIONER

## 2018-03-07 RX ORDER — POTASSIUM CHLORIDE 750 MG/1
40 CAPSULE, EXTENDED RELEASE ORAL AS NEEDED
Status: DISCONTINUED | OUTPATIENT
Start: 2018-03-07 | End: 2018-03-14 | Stop reason: HOSPADM

## 2018-03-07 RX ORDER — COLCHICINE 0.6 MG/1
1.2 TABLET ORAL 2 TIMES DAILY
COMMUNITY
End: 2018-03-14 | Stop reason: HOSPADM

## 2018-03-07 RX ORDER — POTASSIUM CHLORIDE 7.45 MG/ML
10 INJECTION INTRAVENOUS
Status: DISCONTINUED | OUTPATIENT
Start: 2018-03-07 | End: 2018-03-07

## 2018-03-07 RX ORDER — MAGNESIUM SULFATE HEPTAHYDRATE 40 MG/ML
2 INJECTION, SOLUTION INTRAVENOUS AS NEEDED
Status: DISCONTINUED | OUTPATIENT
Start: 2018-03-07 | End: 2018-03-07

## 2018-03-07 RX ORDER — NITROGLYCERIN 0.4 MG/1
0.4 TABLET SUBLINGUAL
Status: DISCONTINUED | OUTPATIENT
Start: 2018-03-07 | End: 2018-03-07

## 2018-03-07 RX ORDER — MAGNESIUM SULFATE 1 G/100ML
1 INJECTION INTRAVENOUS
Status: COMPLETED | OUTPATIENT
Start: 2018-03-07 | End: 2018-03-08

## 2018-03-07 RX ORDER — MAGNESIUM SULFATE 1 G/100ML
1 INJECTION INTRAVENOUS AS NEEDED
Status: DISCONTINUED | OUTPATIENT
Start: 2018-03-07 | End: 2018-03-07

## 2018-03-07 RX ORDER — POTASSIUM CHLORIDE 750 MG/1
40 CAPSULE, EXTENDED RELEASE ORAL ONCE
Status: COMPLETED | OUTPATIENT
Start: 2018-03-07 | End: 2018-03-07

## 2018-03-07 RX ORDER — LORAZEPAM 0.5 MG/1
0.5 TABLET ORAL EVERY 6 HOURS
Status: COMPLETED | OUTPATIENT
Start: 2018-03-07 | End: 2018-03-08

## 2018-03-07 RX ORDER — ACETAMINOPHEN 325 MG/1
650 TABLET ORAL EVERY 4 HOURS PRN
Status: DISCONTINUED | OUTPATIENT
Start: 2018-03-07 | End: 2018-03-14 | Stop reason: HOSPADM

## 2018-03-07 RX ORDER — SODIUM CHLORIDE 0.9 % (FLUSH) 0.9 %
1-10 SYRINGE (ML) INJECTION AS NEEDED
Status: DISCONTINUED | OUTPATIENT
Start: 2018-03-07 | End: 2018-03-14 | Stop reason: HOSPADM

## 2018-03-07 RX ORDER — PANTOPRAZOLE SODIUM 40 MG/1
40 TABLET, DELAYED RELEASE ORAL DAILY
Status: DISCONTINUED | OUTPATIENT
Start: 2018-03-07 | End: 2018-03-14 | Stop reason: HOSPADM

## 2018-03-07 RX ORDER — POTASSIUM CHLORIDE 1.5 G/1.77G
40 POWDER, FOR SOLUTION ORAL AS NEEDED
Status: DISCONTINUED | OUTPATIENT
Start: 2018-03-07 | End: 2018-03-07

## 2018-03-07 RX ORDER — FLUOXETINE HYDROCHLORIDE 20 MG/1
20 CAPSULE ORAL DAILY
Status: DISCONTINUED | OUTPATIENT
Start: 2018-03-07 | End: 2018-03-14 | Stop reason: HOSPADM

## 2018-03-07 RX ORDER — POTASSIUM CHLORIDE 750 MG/1
40 CAPSULE, EXTENDED RELEASE ORAL AS NEEDED
Status: DISCONTINUED | OUTPATIENT
Start: 2018-03-07 | End: 2018-03-07

## 2018-03-07 RX ORDER — ALLOPURINOL 300 MG/1
300 TABLET ORAL DAILY
Status: DISCONTINUED | OUTPATIENT
Start: 2018-03-07 | End: 2018-03-08

## 2018-03-07 RX ORDER — DOBUTAMINE HYDROCHLORIDE 100 MG/100ML
2.5 INJECTION INTRAVENOUS CONTINUOUS
Status: DISCONTINUED | OUTPATIENT
Start: 2018-03-07 | End: 2018-03-07

## 2018-03-07 RX ORDER — BUMETANIDE 0.25 MG/ML
1 INJECTION INTRAMUSCULAR; INTRAVENOUS ONCE
Status: DISCONTINUED | OUTPATIENT
Start: 2018-03-07 | End: 2018-03-08 | Stop reason: SDUPTHER

## 2018-03-07 RX ORDER — COLCHICINE 0.6 MG/1
1.2 TABLET ORAL 2 TIMES DAILY
Status: DISCONTINUED | OUTPATIENT
Start: 2018-03-07 | End: 2018-03-08

## 2018-03-07 RX ORDER — LORAZEPAM 0.5 MG/1
0.5 TABLET ORAL EVERY 8 HOURS
Status: COMPLETED | OUTPATIENT
Start: 2018-03-08 | End: 2018-03-09

## 2018-03-07 RX ADMIN — FLUOXETINE HYDROCHLORIDE 20 MG: 20 CAPSULE ORAL at 21:17

## 2018-03-07 RX ADMIN — PANTOPRAZOLE SODIUM 40 MG: 40 TABLET, DELAYED RELEASE ORAL at 21:18

## 2018-03-07 RX ADMIN — POTASSIUM CHLORIDE 40 MEQ: 750 CAPSULE, EXTENDED RELEASE ORAL at 22:47

## 2018-03-07 RX ADMIN — BUMETANIDE 1 MG/HR: 0.25 INJECTION INTRAMUSCULAR; INTRAVENOUS at 22:47

## 2018-03-07 RX ADMIN — COLCHICINE 1.2 MG: 0.6 TABLET, FILM COATED ORAL at 21:17

## 2018-03-07 RX ADMIN — POTASSIUM CHLORIDE 40 MEQ: 750 CAPSULE, EXTENDED RELEASE ORAL at 16:43

## 2018-03-07 RX ADMIN — APIXABAN 5 MG: 5 TABLET, FILM COATED ORAL at 21:17

## 2018-03-07 RX ADMIN — MAGNESIUM SULFATE HEPTAHYDRATE 1 G: 1 INJECTION, SOLUTION INTRAVENOUS at 22:41

## 2018-03-07 RX ADMIN — POTASSIUM CHLORIDE 40 MEQ: 750 CAPSULE, EXTENDED RELEASE ORAL at 19:07

## 2018-03-07 RX ADMIN — ALLOPURINOL 300 MG: 300 TABLET ORAL at 21:17

## 2018-03-07 RX ADMIN — MAGNESIUM SULFATE HEPTAHYDRATE 1 G: 1 INJECTION, SOLUTION INTRAVENOUS at 23:30

## 2018-03-07 RX ADMIN — METOPROLOL TARTRATE 25 MG: 25 TABLET ORAL at 21:17

## 2018-03-07 RX ADMIN — MAGNESIUM SULFATE HEPTAHYDRATE 100 ML/HR: 500 INJECTION, SOLUTION INTRAMUSCULAR; INTRAVENOUS at 21:17

## 2018-03-07 RX ADMIN — LORAZEPAM 0.5 MG: 0.5 TABLET ORAL at 21:18

## 2018-03-07 NOTE — ED PROVIDER NOTES
EMERGENCY DEPARTMENT ENCOUNTER    CHIEF COMPLAINT  Chief Complaint: Edema  History given by: patient, brother  History limited by: patient  Room Number: 2208/1  PMD: Moustapha Long MD   Cardiology: Dr. Moran      HPI:  Pt is a 68 y.o. male with a h/o CHF and pulmonary HTN who presents complaining of edema to BLE and abd that began about one week ago.  The swelling has progressively got worse.  She reports compliance with medicines.  Pt is on Bumex and Eliquis and has been taking his medications as directed. Pt is a current alcoholic and last drank two days ago. Denies h/o cirrhosis or liver failure and denies seeing a Gi specialist. Denies limiting his fluid intake. Brother states that he has had swelling chronically and been admitted for CHF exacerbation in the past. Denies h/o paracentesis. Brother notes GERARD.  Patient denies any pain currently.    Duration:  gradual  Onset: one week go  Location: General  Radiation: none  Quality: edema  Intensity/Severity: moderate  Progression: constant  Associated Symptoms: GERARD  Aggravating Factors: not stated  Alleviating Factors: not stated  Previous Episodes: h/o CHF  Treatment before arrival: On Bumex    PAST MEDICAL HISTORY  Active Ambulatory Problems     Diagnosis Date Noted   • CHF exacerbation 07/23/2016     Resolved Ambulatory Problems     Diagnosis Date Noted   • No Resolved Ambulatory Problems     Past Medical History:   Diagnosis Date   • Alcoholism    • Arthritis    • Atrial fibrillation    • Cellulitis    • CHF (congestive heart failure)    • Colon polyps 08/21/2006   • History of coronary angioplasty    • History of MRSA infection 2005   • History of staph infection 08/02/2005   • Hypertension    • Infectious viral hepatitis    • Prostate cancer 04/28/2010   • Withdrawal symptoms, alcohol        PAST SURGICAL HISTORY  Past Surgical History:   Procedure Laterality Date   • APPENDECTOMY     • CARDIAC ABLATION Right 01/18/2013    Atrial Flutter Ablation,   Otis Srinivasan   • CARDIAC CATHETERIZATION N/A 8/2/2016    Procedure: Left Heart Cath   ?right cath too;  Surgeon: Epifanio May MD;  Location:  [facility];  Service:    • CARDIAC CATHETERIZATION N/A 8/2/2016    Procedure: Coronary angiography;  Surgeon: Epifanio May MD;  Location:  [facility];  Service:    • CARDIAC CATHETERIZATION N/A 8/2/2016    Procedure: Left ventriculography;  Surgeon: Epifanio May MD;  Location:  [facility];  Service:    • CARDIAC CATHETERIZATION N/A 8/2/2016    Procedure: Right Heart Cath;  Surgeon: Epifanio May MD;  Location:  [facility];  Service:    • COLONOSCOPY N/A 3/15/2017    Procedure: COLONOSCOPY TO CECUM WITH COLD BIOPSY POLYECTOMY;  Surgeon: Anibal Bower MD;  Location: [facility]COPY;  Service:    • COLONOSCOPY W/ BIOPSIES AND POLYPECTOMY N/A 08/21/2006    Colonoscopy w/ snare cautery, polypectomy & biopsy, PATH:  Sigmoid Colon Biopsy: focal vascular congestion & evidence of recent hemorrhage, non-specific.  Recto-Sigmoid Colon Biopsy: Fragments of tubular adenoam w/ mild dysplasia. Dr. Mildred You   • CORONARY ANGIOPLASTY WITH STENT PLACEMENT      x 2    • CYSTOSCOPY N/A 04/28/2010    Cystoscopy w/ transurethral incision of the bladder neck, Dr. ANDREW Cordova   • CYSTOTOMY N/A 09/23/2011    Laparoscopic closure of incidental cystotomy, Laparoscopic incision of pelvic lymphocele, Dr. Jaime Royal   • JOINT REPLACEMENT     • LEG DEBRIDEMENT Left 08/19/2005    Left Thigh, Debridement skin & subcutaneous tissue muscle w/ closure via advancement flaps, Dr. Mildred You   • LEG DEBRIDEMENT Left 08/08/2005    Debridement of left anterior thigh, Dr. Mildred You   • PROSTATECTOMY N/A 04/28/2010    Adenocarcinoma, Primary [place] Grade 3, Secondar Yissel Grade 3. Combined yissel score 6. Tumor involves rt & lt lobes, negative capsular margin, no invasion of  seminal vesicles, no identified perineural invastion, Dr. Jaime Royal   • SKIN BIOPSY     • TONSILLECTOMY     • TOTAL KNEE ARTHROPLASTY Left 03/25/2008    Left total knee arthroplasty w/ [place] pinless computer navigation, Dr. Ashok Crocker   • WOUND DEBRIDEMENT Left 08/02/2005    Left Buttocks & Left thigh wounds, Debridement of left buttocks & left thigh wounds, Dr. Mildred You       FAMILY HISTORY  Family History   Problem Relation Age of Onset   • Heart disease Mother    • Alcohol abuse Father    • Liver cancer Father    • Cancer Sister    • Hypertension Brother    • Alcohol abuse Brother    • Skin cancer Brother        SOCIAL HISTORY  Social History     Social History   • Marital status:      Spouse name: N/A   • Number of children: N/A   • Years of education: N/A     Occupational History   • Not on file.     Social History Main Topics   • Smoking status: Former Smoker     Packs/day: 1.50     Quit date: 3/15/2007   • Smokeless tobacco: Not on file   • Alcohol use 3.6 oz/week     6 Cans of beer per week      Comment: 1 pint a day OR MORE OF VODKA   • Drug use: No   • Sexual activity: Defer     Other Topics Concern   • Not on file     Social History Narrative       ALLERGIES  Review of patient's allergies indicates no known allergies.    REVIEW OF SYSTEMS  Review of Systems   Constitutional: Negative for activity change, appetite change and fever.   HENT: Negative for congestion and sore throat.    Eyes: Negative.    Respiratory: Positive for shortness of breath (on exertion). Negative for cough.    Cardiovascular: Positive for leg swelling (BLE). Negative for chest pain.   Gastrointestinal: Positive for abdominal distention. Negative for abdominal pain, diarrhea and vomiting.   Endocrine: Negative.    Genitourinary: Negative for decreased urine volume and dysuria.   Musculoskeletal: Negative for neck pain.   Skin: Negative for rash and wound.   Allergic/Immunologic: Negative.    Neurological:  Negative for weakness, numbness and headaches.   Hematological: Negative.    Psychiatric/Behavioral: Negative.    All other systems reviewed and are negative.      PHYSICAL EXAM  ED Triage Vitals   Temp Heart Rate Resp BP SpO2   03/07/18 1249 03/07/18 1249 03/07/18 1249 03/07/18 1255 03/07/18 1249   98 °F (36.7 °C) 85 18 155/109 97 %      Temp src Heart Rate Source Patient Position BP Location FiO2 (%)   03/07/18 1249 03/07/18 1249 03/07/18 1519 03/07/18 1519 --   Tympanic Monitor Sitting Right arm        Physical Exam   Constitutional: He is oriented to person, place, and time and well-developed, well-nourished, and in no distress.   chronically ill appearring, older than stated age   HENT:   Head: Normocephalic and atraumatic.   Eyes: EOM are normal. Pupils are equal, round, and reactive to light. No scleral icterus.   Neck: Normal range of motion. Neck supple.   Cardiovascular: Normal rate and normal heart sounds.  An irregularly irregular rhythm present.   Pulmonary/Chest: Effort normal and breath sounds normal. No respiratory distress.   Crackles in the bases of the lungs   Abdominal: Soft. There is no tenderness. There is no rebound and no guarding.   Ascites severe   Musculoskeletal: Normal range of motion. He exhibits edema (2+ in BLE).   Neurological: He is alert and oriented to person, place, and time. He has normal sensation and normal strength.   Skin: Skin is warm and dry.   Psychiatric: Mood and affect normal.   Nursing note and vitals reviewed.      LAB RESULTS  Lab Results (last 24 hours)     Procedure Component Value Units Date/Time    BNP [[id number]]  (Abnormal) Collected:  03/07/18 1338    Specimen:  Blood Updated:  03/07/18 1415     proBNP 38029.0 (H) pg/mL     Narrative:       Among patients with dyspnea, NT-proBNP is highly sensitive for the detection of acute congestive heart failure. In addition NT-proBNP of <300 pg/ml effectively rules out acute congestive heart failure with 99% negative  predictive value.    CBC & Differential [[id number]] Collected:  03/07/18 1338    Specimen:  Blood Updated:  03/07/18 1356    Narrative:       The following orders were created for panel order CBC & Differential.  Procedure                               Abnormality         Status                     ---------                               -----------         ------                     CBC Auto Differential[[id number]]         Abnormal            Final result                 Please view results for these tests on the individual orders.    Comprehensive Metabolic Panel [[id number]]  (Abnormal) Collected:  03/07/18 1338    Specimen:  Blood Updated:  03/07/18 1417     Glucose 90 mg/dL      BUN 25 (H) mg/dL      Creatinine 1.35 (H) mg/dL      Sodium 141 mmol/L      Potassium 2.8 (L) mmol/L      Chloride 93 (L) mmol/L      CO2 34.0 (H) mmol/L      Calcium 8.7 mg/dL      Total Protein 6.8 g/dL      Albumin 3.80 g/dL      ALT (SGPT) 8 U/L      AST (SGOT) 19 U/L      Alkaline Phosphatase 98 U/L      Total Bilirubin 1.5 (H) mg/dL      eGFR Non African Amer 52 (L) mL/min/1.73      Globulin 3.0 gm/dL      A/G Ratio 1.3 g/dL      BUN/Creatinine Ratio 18.5     Anion Gap 14.0 mmol/L     CBC Auto Differential [[id number]]  (Abnormal) Collected:  03/07/18 1338    Specimen:  Blood Updated:  03/07/18 1356     WBC 3.90 (L) 10*3/mm3      RBC 3.58 (L) 10*6/mm3      Hemoglobin 11.2 (L) g/dL      Hematocrit 34.4 (L) %      MCV 96.1 fL      MCH 31.3 pg      MCHC 32.6 g/dL      RDW 13.8 %      RDW-SD 48.4 fl      MPV 10.8 fL      Platelets 114 (L) 10*3/mm3      Neutrophil % 67.2 %      Lymphocyte % 15.1 (L) %      Monocyte % 17.4 (H) %      Eosinophil % 0.3 %      Basophil % 0.0 %      Immature Grans % 0.0 %      Neutrophils, Absolute 2.62 10*3/mm3      Lymphocytes, Absolute 0.59 (L) 10*3/mm3      Monocytes, Absolute 0.68 10*3/mm3      Eosinophils, Absolute 0.01 10*3/mm3      Basophils, Absolute 0.00 10*3/mm3      Immature Grans, Absolute 0.00  10*3/mm3     Troponin [[id number]]  (Abnormal) Collected:  03/07/18 1338    Specimen:  Blood Updated:  03/07/18 1644     Troponin T 0.048 (H) ng/mL     Narrative:       Troponin T Reference Ranges:  Less than 0.03 ng/mL:    Negative for AMI  0.03 to 0.09 ng/mL:      Indeterminant for AMI  Greater than 0.09 ng/mL: Positive for AMI    Magnesium [[id number]]  (Abnormal) Collected:  03/07/18 1338    Specimen:  Blood Updated:  03/07/18 1725     Magnesium 0.9 (C) mg/dL     Protime-INR [[id number]]  (Abnormal) Collected:  03/07/18 1639    Specimen:  Blood Updated:  03/07/18 1700     Protime 16.0 (H) Seconds      INR 1.31 (H)    aPTT [[id number]]  (Normal) Collected:  03/07/18 1639    Specimen:  Blood Updated:  03/07/18 1700     PTT 29.7 seconds           I ordered the above labs and reviewed the results    RADIOLOGY  XR Chest 2 View   Preliminary Result   Borderline cardiomegaly and small left pleural effusion.          XR Chest 1 View    (Results Pending)   CT Abdomen Pelvis Without Contrast    (Results Pending)        I ordered the above noted radiological studies. Interpreted by radiologist. Discussed with radiologist. Reviewed by me in PACS.       PROCEDURES  Procedures  EKG:  Time: 1623  Rate: 95  Interpretation: afib with IVCD, frequent PVCs, diffuse nonspecific ST and atT wave changes, difference from. 08 2016 when he was in a narrow complex, otherwise similar..    Interpreted Contemporaneously by me, independently viewed    PROGRESS AND CONSULTS  ED Course   Value Comment By Time   proBNP: (!) 30206.0 Previous BNP was 11-16,000 1 year ago Austin Hernandez MD 03/07 1542   Creatinine: (!) 1.35 Mild worsening of renal insufficiency Austin Hernandez MD 03/07 1542   Hemoglobin: (!) 11.2 Patient has chronic anemia with a hemoglobin varying from 9-12 one year ago Austin Hernandez MD 03/07 1546   1301: labs ordered prior to my evaluation.     1602: additional las and Chest Xray ordered for further evaluation.     1633: Based on EKG  findings in the ED, cardiology will be consulted. /96. HR currently 87. Pt is currently stable and in NAD in the room.    1635: Discussed case with Dr. Moran, cardiology concerning the patient and the findings in the ED. Dr. Moran will consult on the patient..     1655: Dr. Moran's APRN at bedside evaluating the patient.     1717: Discussed case with Dr. Randall, [facility] hospitalist concerning the patient and the findings in the ED. Dr. Randall requests admittance to tele.      1722: Rechecked pt. Discussed plan for admission at this time, based on BNP levels, EKG findings, and presentation of symptoms. Discussed diagnosis of CHF and ascites  and consultation with Dr. Randall for admission. Pt understands and agrees with the plan of care. All questions addressed at this time.    MEDICAL DECISION MAKING  Results were reviewed/discussed with the patient and they were also made aware of online access. Pt also made aware that some labs, such as cultures, will not be resulted during ER visit and follow up with PMD is necessary.     MDM  Number of Diagnoses or Management Options  Acute on chronic congestive heart failure, unspecified congestive heart failure type:   Atrial fibrillation, unspecified type:   Coagulopathy on Eliquis:   Elevated troponin:   New onset ascites of liver:      Amount and/or Complexity of Data Reviewed  Clinical lab tests: ordered and reviewed (BNP 31856, PT 16.0, INR 1.31, Troponin 0.048, WBC 3.90, BUN 25, Creatinine 1.35)  Tests in the medicine section of CPT®: reviewed and ordered (EKG Documented in procedure section)  Decide to obtain previous medical records or to obtain history from someone other than the patient: yes  Review and summarize past medical records: yes (Pt sees Dr. Moran for his Pt had a heart cath on 8/2016 h/o pulmonoary HTN with 50-60% stenosis in several different vessels. )  Discuss the patient with other providers: yes (Cardiology,  hospitalist)    Patient Progress  Patient progress: stable         DIAGNOSIS  Final diagnoses:   New onset ascites of liver   Acute on chronic congestive heart failure, unspecified congestive heart failure type   Elevated troponin   Coagulopathy on Eliquis   Atrial fibrillation, unspecified type       DISPOSITION  ADMISSION    Discussed treatment plan and reason for admission with pt/family and admitting physician.  Pt/family voiced understanding of the plan for admission for further testing/treatment as needed.       Latest Documented Vital Signs:  As of 6:14 PM  BP- 150/99 HR- 93 Temp- 98 °F (36.7 °C) (Tympanic) O2 sat- 98%    --  Documentation assistance provided by carlo Boyce for Austin Hernandez MD.  Information recorded by the scribe was done at my direction and has been verified and validated by me.       Sharita Boyce  03/07/18 172       Austin Hernandez MD  03/07/18 1818

## 2018-03-07 NOTE — H&P
[facility]  History & Physical Note                                                                                    Patient Identification:  Anibal Freedman:   68 y.o.  male  1950  [id number]            Chief Complaint   Patient presents with   • Edema       Date of Admission: 3-7-2018    Admitting Physician:  Dr May    Reason for Admission:Right heart failure [I50.810], Chief Complaint   Patient presents with   • Edema       History of Present Illness:   This patient is a 68-year-old male whose primary cardiologist, Dr. Tank Baumann  follows for CAD,  permanent atrial fibrillation (Eliquis) and diastolic heart failure.  He had an atrial flutter ablation in the past and 2v PCI and 1v PTCA in 2013.  He had a right and left heart catheterization August 2016  (see below) which showed severe pulmonary hypertension, 40-50% proximal LAD ISR and 50-60% disease in the RCA for which aggressive risk factor modification and medical therapy was recommended.  His EF at that time was 55% with mild-moderate valvular heart disease.  He has been followed by nephrology for stage III CKD, oncology for history of prostate cancer and his PCP treats him for alcohol dependence, gouty arthritis.    He presents to the ER reporting a 10 day history of increasing lower extremity edema, increasing abdominal girth and shortness of breath.  He states she's been compliant with his medications.  He reports PND, orthopnea and nonproductive cough.  Denies chest pain, palpitations, nausea, vomiting    Admission EKG (see below) shows atrial fibrillation with cvr, PVC, anteroseptal Q waves and NSIVCD. Troponin 0.048 in the setting of BUN 25, creatinine 1.35.    BNP 78427  Potassium 2.8    Cardiac Risk Factors: HTN    Past Medical History:  Past Medical History:   Diagnosis Date   • Alcoholism    • Arthritis    • Atrial fibrillation     pt reported   • Cellulitis    • CHF (congestive heart failure)    • Colon polyps  08/21/2006    Colonoscopy w/ snare cautery, polypectomy & biopsy, PATH:  Sigmoid Colon Biopsy: focal vascular congestion & evidence of recent hemorrhage, non-specific.  Recto-Sigmoid Colon Biopsy: Fragments of tubular adenoam w/ mild dysplasia. Dr. Mildred You   • History of coronary angioplasty    • History of MRSA infection 2005    pt reported   • History of staph infection 08/02/2005   • Hypertension    • Infectious viral hepatitis     pt reported   • Prostate cancer 04/28/2010    S/P Radical Prostectomy, Adenocarcinoma, Primary [place] Grade 3, Secondar Yissel Grade 3. Combined yissel score 6. Tumor involves rt & lt lobes, negative capsular margin, no invasion of seminal vesicles, no identified perineural invastion   • Withdrawal symptoms, alcohol     at least 3 stable    Past Surgical History:  Past Surgical History:   Procedure Laterality Date   • APPENDECTOMY     • CARDIAC ABLATION Right 01/18/2013    Atrial Flutter Ablation, Dr. Otis Srinivasan   • CARDIAC CATHETERIZATION N/A 8/2/2016    Procedure: Left Heart Cath   ?right cath too;  Surgeon: Epifanio May MD;  Location: [facility] CATH INVASIVE LOCATION;  Service:    • CARDIAC CATHETERIZATION N/A 8/2/2016    Procedure: Coronary angiography;  Surgeon: Epifanio May MD;  Location: [facility] CATH INVASIVE LOCATION;  Service:    • CARDIAC CATHETERIZATION N/A 8/2/2016    Procedure: Left ventriculography;  Surgeon: Epifanio May MD;  Location: [facility] CATH INVASIVE LOCATION;  Service:    • CARDIAC CATHETERIZATION N/A 8/2/2016    Procedure: Right Heart Cath;  Surgeon: Epifanio May MD;  Location: [facility] CATH INVASIVE LOCATION;  Service:    • COLONOSCOPY N/A 3/15/2017    Procedure: COLONOSCOPY TO CECUM WITH COLD BIOPSY POLYECTOMY;  Surgeon: Anibal Bower MD;  Location: [facility] ENDOSCOPY;  Service:    • COLONOSCOPY W/ BIOPSIES AND POLYPECTOMY N/A 08/21/2006    Colonoscopy w/ snare cautery, polypectomy & biopsy, PATH:  Sigmoid Colon  Biopsy: focal vascular congestion & evidence of recent hemorrhage, non-specific.  Recto-Sigmoid Colon Biopsy: Fragments of tubular adenoam w/ mild dysplasia. Dr. Mildred You   • CORONARY ANGIOPLASTY WITH STENT PLACEMENT      x 2    • CYSTOSCOPY N/A 04/28/2010    Cystoscopy w/ transurethral incision of the bladder neck, Dr. ANDREW Cordova   • CYSTOTOMY N/A 09/23/2011    Laparoscopic closure of incidental cystotomy, Laparoscopic incision of pelvic lymphocele, Dr. Jaime Royal   • JOINT REPLACEMENT     • LEG DEBRIDEMENT Left 08/19/2005    Left Thigh, Debridement skin & subcutaneous tissue muscle w/ closure via advancement flaps, Dr. Mildred You   • LEG DEBRIDEMENT Left 08/08/2005    Debridement of left anterior thigh, Dr. Mildred Yuo   • PROSTATECTOMY N/A 04/28/2010    Adenocarcinoma, Primary [place] Grade 3, Secondar Yissel Grade 3. Combined yissel score 6. Tumor involves rt & lt lobes, negative capsular margin, no invasion of seminal vesicles, no identified perineural invastion, Dr. Jaime Royal   • SKIN BIOPSY     • TONSILLECTOMY     • TOTAL KNEE ARTHROPLASTY Left 03/25/2008    Left total knee arthroplasty w/ [place] pinless computer navigation, Dr. Ashok Crocker   • WOUND DEBRIDEMENT Left 08/02/2005    Left Buttocks & Left thigh wounds, Debridement of left buttocks & left thigh wounds, Dr. Mildred You        Social History:   Social History   Substance Use Topics   • Smoking status: Former Smoker     Packs/day: 1.50     Quit date: 3/15/2007   • Smokeless tobacco: Not on file   • Alcohol use 3.6 oz/week     6 Cans of beer per week      Comment: 1 pint a day OR MORE OF VODKA        Family History:  Family History   Problem Relation Age of Onset   • Heart disease Mother    • Alcohol abuse Father    • Liver cancer Father    • Cancer Sister    • Hypertension Brother    • Alcohol abuse Brother    • Skin cancer Brother           Allergies:  No Known Allergies    Home Meds:  No current  "facility-administered medications on file prior to encounter.      Current Outpatient Prescriptions on File Prior to Encounter   Medication Sig Dispense Refill   • allopurinol (ZYLOPRIM) 300 MG tablet Take 300 mg by mouth Daily.     • apixaban (ELIQUIS) 5 MG tablet tablet Take 5 mg by mouth 2 (two) times a day.     • bumetanide (BUMEX) 2 MG tablet Take 2 mg by mouth 2 (two) times a day.     • FLUoxetine (PROzac) 20 MG capsule Take 20 mg by mouth daily.     • ferrous sulfate 325 (65 FE) MG tablet Take 325 mg by mouth Daily With Breakfast.     • metoprolol tartrate (LOPRESSOR) 25 MG tablet Take 1 tablet by mouth every 12 (twelve) hours. 60 tablet 0   • pantoprazole (PROTONIX) 40 MG EC tablet Take 40 mg by mouth daily.     • potassium chloride (K-DUR,KLOR-CON) 20 MEQ CR tablet Take 20 mEq by mouth 2 (Two) Times a Day.           Scheduled Meds:          INTAKE AND OUTPUT:  No intake or output data in the 24 hours ending 03/07/18 1706        Review of Systems:  Constitutional: No fever, chills   Eyes: No eye pain, vision changes   ENT/oropharynx: No difficulty swallowing, no epistaxis, no changes in hearing   Cardiovascular: +  paroxysmal nocturnal dyspnea, orthopnea, No chest pain, chest tightness, palpitations, diaphoresis, dizziness / syncopal episode   Respiratory: + shortness of breath, dyspnea on exertion,non productive  cough, no wheezing or hemoptysis   Gastrointestinal: + abdominal \"fulness'. No nausea, vomiting, diarrhea, bloody stools   Genitourinary: No hematuria   Neurological: No headache, one-sided weakness    Musculoskeletal: No cramps, joint pain   Integument: No rash, +LE, abdominal edema       Constitutional:  Temp:  [98 °F (36.7 °C)] 98 °F (36.7 °C)  Heart Rate:  [78-93] 93  Resp:  [18] 18  BP: (128-155)/() 145/96    Physical Exam:  General:  Frail, chronically ill appearing white male   Eyes: PERTL,  HEENT:  +JVD sitting upright. Thyroid not visibly enlarged. No mucosal cyanosis  Respiratory: " Respirations regular with use of accessory muscles at rest. BBS with markedly decreased air entry beyond mid lung fields bilaterally. Fine crackles, no wheezes auscultated  Cardiovascular: S1S2 +S3 irregular rate and rhythm.  No carotid bruits. DP pulses <1. 4+ lower extremity from waist to feet, +sacral pititng edema  Gastrointestinal: Abdomen distended, firm. +ascites   Musculoskeletal: PEDRO x4. No abnormal movements  Extremities: +acrocyanosis, hands cool and dusky   Skin: Skin dry to touch. No rashes    Neuro: AAO x3 CN II-XII grossly intact                           Cardiographics    Admission ECG:       ECHO 2016:  · Mild to moderate tricuspid valve regurgitation is present.  · Mild mitral valve regurgitation is present  · All left ventricular wall segments contract normally.  · Right ventricular cavity is borderline dilated.  · Left atrial cavity size is borderline dilated.  · Left Ventricle: Calculated EF = 55.9%  · There is no evidence of pericardial effusion.    R&L heart cath 8-2016:  HEMODYNAMIC / ANGIOGRAPHIC DATA:    .   1. Pulmonary artery systolic pressure was 55/25.  2. Right atrial pressure was 10..  3. Left ventricular end diastolic pressure was 15 mmHg.  4. The left main is normal.  5. The LAD is proximal stent had 40-50% stenosis  6. Nondominant circumflex free of any atherosclerotic narrowing.  7. The right coronary artery is dominant with a diffuse proximal 50% mid 50-60% stenosis.  8. Mild pulmonary hypertension       Lab Review:    Results from last 7 days  Lab Units 03/07/18  1338   TROPONIN T ng/mL 0.048*     Results from last 7 days  Lab Units 03/07/18  1338   SODIUM mmol/L 141   POTASSIUM mmol/L 2.8*   BUN mg/dL 25*   CREATININE mg/dL 1.35*   CALCIUM mg/dL 8.7     Results from last 7 days  Lab Units 03/07/18  1338   WBC 10*3/mm3 3.90*   HEMOGLOBIN g/dL 11.2*   HEMATOCRIT % 34.4*   PLATELETS 10*3/mm3 114*       Results from last 7 days  Lab Units 03/07/18  1639   INR  1.31*   APTT seconds  "29.7         CXR:   pending       Assessment / Plan:  - a/c R>L systolic heart failure  - cor pulmonale / ascites  - severe PHTN  - portal HTN  - hypokalemia  - CKD III  - Pancytopenia  - CAD - h/o BMS LAD, kissing balloon to adjacent diagonal,  BMS-> mid-distal RCA 1-2013  - (Permanent) atrial fibrillation -> Eliquis  - alcoholism  - h/o atrial flutter ablation 2013    Recommendations:  Started on Bumex and low-dose dobutamine infusions.  2-D echo, chest x-ray, CT of the abdomen, serial cardiac enzymes and EKGs ordered.  Nephrology, internal medicine consults    Labs/tests ordered: Medication adjustment,2-D echo, chest x-ray, CT of the abdomen, serial cardiac enzymes and EKGs, am BNP, BMP    I reviewed the patient's new clinical results and treatment plan with Dr May.  I personally viewed and interpreted the patient's EKG/Telemetry data    Epifanio May MD  3/7/2018  5:06 PM    EMR Dragon/Transcription:   \"Dictated utilizing Dragon dictation\".     "

## 2018-03-07 NOTE — ED TRIAGE NOTES
"Patient states \"I'm retaining a lot of water in my stomach, legs, ankles and feet. It's been going on for about a week.\"   "

## 2018-03-07 NOTE — PLAN OF CARE
Problem: Patient Care Overview (Adult)  Goal: Plan of Care Review  Outcome: Ongoing (interventions implemented as appropriate)   03/07/18 1180   Coping/Psychosocial Response Interventions   Plan Of Care Reviewed With patient   Patient Care Overview   Progress no change     Goal: Adult Individualization and Mutuality  Outcome: Ongoing (interventions implemented as appropriate)    Goal: Discharge Needs Assessment  Outcome: Ongoing (interventions implemented as appropriate)      Problem: Cardiac: Heart Failure (Adult)  Goal: Signs and Symptoms of Listed Potential Problems Will be Absent or Manageable (Cardiac: Heart Failure)  Outcome: Ongoing (interventions implemented as appropriate)      Problem: Acute Alcohol Withdrawal Syndrome, Risk For/Actual (Adult)  Goal: Signs and Symptoms of Listed Potential Problems Will be Absent or Manageable (Acute Alcohol Withdrawal Syndrome, Risk For/Actual)  Outcome: Ongoing (interventions implemented as appropriate)

## 2018-03-08 ENCOUNTER — APPOINTMENT (OUTPATIENT)
Dept: CARDIOLOGY | Facility: HOSPITAL | Age: 68
End: 2018-03-08

## 2018-03-08 PROBLEM — E87.6 HYPOKALEMIA: Status: ACTIVE | Noted: 2018-03-08

## 2018-03-08 PROBLEM — E83.42 HYPOMAGNESEMIA: Status: ACTIVE | Noted: 2018-03-08

## 2018-03-08 LAB
ALBUMIN SERPL-MCNC: 3.4 G/DL (ref 3.5–5.2)
ALBUMIN/GLOB SERPL: 1.3 G/DL
ALP SERPL-CCNC: 91 U/L (ref 39–117)
ALT SERPL W P-5'-P-CCNC: 8 U/L (ref 1–41)
ANION GAP SERPL CALCULATED.3IONS-SCNC: 12.5 MMOL/L
AST SERPL-CCNC: 20 U/L (ref 1–40)
BILIRUB SERPL-MCNC: 1.4 MG/DL (ref 0.1–1.2)
BILIRUB UR QL STRIP: NEGATIVE
BUN BLD-MCNC: 27 MG/DL (ref 8–23)
BUN/CREAT SERPL: 19.6 (ref 7–25)
CALCIUM SPEC-SCNC: 8.2 MG/DL (ref 8.6–10.5)
CHLORIDE SERPL-SCNC: 96 MMOL/L (ref 98–107)
CHOLEST SERPL-MCNC: 117 MG/DL (ref 0–200)
CLARITY UR: CLEAR
CO2 SERPL-SCNC: 32.5 MMOL/L (ref 22–29)
COLOR UR: YELLOW
CREAT BLD-MCNC: 1.38 MG/DL (ref 0.76–1.27)
GFR SERPL CREATININE-BSD FRML MDRD: 51 ML/MIN/1.73
GLOBULIN UR ELPH-MCNC: 2.7 GM/DL
GLUCOSE BLD-MCNC: 93 MG/DL (ref 65–99)
GLUCOSE BLDC GLUCOMTR-MCNC: 105 MG/DL (ref 70–130)
GLUCOSE BLDC GLUCOMTR-MCNC: 124 MG/DL (ref 70–130)
GLUCOSE BLDC GLUCOMTR-MCNC: 80 MG/DL (ref 70–130)
GLUCOSE UR STRIP-MCNC: NEGATIVE MG/DL
HBA1C MFR BLD: 4.8 % (ref 4.8–5.6)
HDLC SERPL-MCNC: 53 MG/DL (ref 40–60)
HGB UR QL STRIP.AUTO: NEGATIVE
KETONES UR QL STRIP: NEGATIVE
LDLC SERPL CALC-MCNC: 52 MG/DL (ref 0–100)
LDLC/HDLC SERPL: 0.99 {RATIO}
LEUKOCYTE ESTERASE UR QL STRIP.AUTO: NEGATIVE
MAGNESIUM SERPL-MCNC: 1.5 MG/DL (ref 1.6–2.4)
MAGNESIUM SERPL-MCNC: 1.7 MG/DL (ref 1.6–2.4)
NITRITE UR QL STRIP: NEGATIVE
NT-PROBNP SERPL-MCNC: ABNORMAL PG/ML (ref 5–900)
PH UR STRIP.AUTO: 5.5 [PH] (ref 5–8)
POTASSIUM BLD-SCNC: 3.4 MMOL/L (ref 3.5–5.2)
POTASSIUM BLD-SCNC: 4 MMOL/L (ref 3.5–5.2)
POTASSIUM BLD-SCNC: 4.2 MMOL/L (ref 3.5–5.2)
PROT SERPL-MCNC: 6.1 G/DL (ref 6–8.5)
PROT UR QL STRIP: NEGATIVE
SODIUM BLD-SCNC: 141 MMOL/L (ref 136–145)
SP GR UR STRIP: 1.01 (ref 1–1.03)
TRIGL SERPL-MCNC: 58 MG/DL (ref 0–150)
TROPONIN T SERPL-MCNC: 0.04 NG/ML (ref 0–0.03)
TROPONIN T SERPL-MCNC: 0.04 NG/ML (ref 0–0.03)
UROBILINOGEN UR QL STRIP: NORMAL
VLDLC SERPL-MCNC: 11.6 MG/DL (ref 5–40)

## 2018-03-08 PROCEDURE — 84484 ASSAY OF TROPONIN QUANT: CPT | Performed by: NURSE PRACTITIONER

## 2018-03-08 PROCEDURE — 83735 ASSAY OF MAGNESIUM: CPT | Performed by: HOSPITALIST

## 2018-03-08 PROCEDURE — 81003 URINALYSIS AUTO W/O SCOPE: CPT | Performed by: INTERNAL MEDICINE

## 2018-03-08 PROCEDURE — 94799 UNLISTED PULMONARY SVC/PX: CPT

## 2018-03-08 PROCEDURE — 80053 COMPREHEN METABOLIC PANEL: CPT | Performed by: NURSE PRACTITIONER

## 2018-03-08 PROCEDURE — 82962 GLUCOSE BLOOD TEST: CPT

## 2018-03-08 PROCEDURE — 93306 TTE W/DOPPLER COMPLETE: CPT | Performed by: INTERNAL MEDICINE

## 2018-03-08 PROCEDURE — 84132 ASSAY OF SERUM POTASSIUM: CPT | Performed by: HOSPITALIST

## 2018-03-08 PROCEDURE — 99231 SBSQ HOSP IP/OBS SF/LOW 25: CPT | Performed by: INTERNAL MEDICINE

## 2018-03-08 PROCEDURE — 83036 HEMOGLOBIN GLYCOSYLATED A1C: CPT | Performed by: INTERNAL MEDICINE

## 2018-03-08 PROCEDURE — 0W9G3ZZ DRAINAGE OF PERITONEAL CAVITY, PERCUTANEOUS APPROACH: ICD-10-PCS | Performed by: RADIOLOGY

## 2018-03-08 PROCEDURE — 83880 ASSAY OF NATRIURETIC PEPTIDE: CPT | Performed by: NURSE PRACTITIONER

## 2018-03-08 PROCEDURE — 80061 LIPID PANEL: CPT | Performed by: NURSE PRACTITIONER

## 2018-03-08 PROCEDURE — 93005 ELECTROCARDIOGRAM TRACING: CPT | Performed by: NURSE PRACTITIONER

## 2018-03-08 PROCEDURE — 93010 ELECTROCARDIOGRAM REPORT: CPT | Performed by: INTERNAL MEDICINE

## 2018-03-08 PROCEDURE — 94640 AIRWAY INHALATION TREATMENT: CPT

## 2018-03-08 PROCEDURE — 84132 ASSAY OF SERUM POTASSIUM: CPT | Performed by: INTERNAL MEDICINE

## 2018-03-08 PROCEDURE — 90791 PSYCH DIAGNOSTIC EVALUATION: CPT | Performed by: SOCIAL WORKER

## 2018-03-08 PROCEDURE — 25010000002 PERFLUTREN (DEFINITY) 8.476 MG IN SODIUM CHLORIDE 0.9 % 10 ML INJECTION: Performed by: INTERNAL MEDICINE

## 2018-03-08 PROCEDURE — 93306 TTE W/DOPPLER COMPLETE: CPT

## 2018-03-08 PROCEDURE — 99222 1ST HOSP IP/OBS MODERATE 55: CPT | Performed by: INTERNAL MEDICINE

## 2018-03-08 RX ORDER — IPRATROPIUM BROMIDE AND ALBUTEROL SULFATE 2.5; .5 MG/3ML; MG/3ML
3 SOLUTION RESPIRATORY (INHALATION)
Status: DISCONTINUED | OUTPATIENT
Start: 2018-03-08 | End: 2018-03-12

## 2018-03-08 RX ORDER — THIAMINE MONONITRATE (VIT B1) 100 MG
100 TABLET ORAL DAILY
Status: DISCONTINUED | OUTPATIENT
Start: 2018-03-08 | End: 2018-03-14 | Stop reason: HOSPADM

## 2018-03-08 RX ORDER — ALLOPURINOL 100 MG/1
100 TABLET ORAL DAILY
Status: DISCONTINUED | OUTPATIENT
Start: 2018-03-09 | End: 2018-03-14 | Stop reason: HOSPADM

## 2018-03-08 RX ORDER — DIPHENOXYLATE HYDROCHLORIDE AND ATROPINE SULFATE 2.5; .025 MG/1; MG/1
1 TABLET ORAL DAILY
Status: DISCONTINUED | OUTPATIENT
Start: 2018-03-08 | End: 2018-03-14 | Stop reason: HOSPADM

## 2018-03-08 RX ORDER — BUMETANIDE 0.25 MG/ML
1 INJECTION INTRAMUSCULAR; INTRAVENOUS EVERY 12 HOURS
Status: DISCONTINUED | OUTPATIENT
Start: 2018-03-08 | End: 2018-03-09

## 2018-03-08 RX ORDER — NICOTINE POLACRILEX 4 MG
15 LOZENGE BUCCAL
Status: DISCONTINUED | OUTPATIENT
Start: 2018-03-08 | End: 2018-03-09

## 2018-03-08 RX ORDER — IBUPROFEN 200 MG
1 TABLET ORAL DAILY
Status: DISCONTINUED | OUTPATIENT
Start: 2018-03-08 | End: 2018-03-14 | Stop reason: HOSPADM

## 2018-03-08 RX ORDER — FOLIC ACID 1 MG/1
1 TABLET ORAL DAILY
Status: DISCONTINUED | OUTPATIENT
Start: 2018-03-08 | End: 2018-03-14 | Stop reason: HOSPADM

## 2018-03-08 RX ORDER — DEXTROSE MONOHYDRATE 25 G/50ML
25 INJECTION, SOLUTION INTRAVENOUS
Status: DISCONTINUED | OUTPATIENT
Start: 2018-03-08 | End: 2018-03-09

## 2018-03-08 RX ADMIN — LORAZEPAM 0.5 MG: 0.5 TABLET ORAL at 20:10

## 2018-03-08 RX ADMIN — PERFLUTREN 3 ML: 6.52 INJECTION, SUSPENSION INTRAVENOUS at 11:14

## 2018-03-08 RX ADMIN — METOPROLOL TARTRATE 25 MG: 25 TABLET ORAL at 08:56

## 2018-03-08 RX ADMIN — LORAZEPAM 0.5 MG: 0.5 TABLET ORAL at 03:17

## 2018-03-08 RX ADMIN — COLCHICINE 1.2 MG: 0.6 TABLET, FILM COATED ORAL at 08:56

## 2018-03-08 RX ADMIN — LORAZEPAM 0.5 MG: 0.5 TABLET ORAL at 08:56

## 2018-03-08 RX ADMIN — Medication 1 TABLET: at 20:11

## 2018-03-08 RX ADMIN — IPRATROPIUM BROMIDE AND ALBUTEROL SULFATE 3 ML: .5; 3 SOLUTION RESPIRATORY (INHALATION) at 21:12

## 2018-03-08 RX ADMIN — APIXABAN 5 MG: 5 TABLET, FILM COATED ORAL at 08:56

## 2018-03-08 RX ADMIN — BACITRACIN ZINC NEOMYCIN SULFATE POLYMYXIN B SULFATE 1 APPLICATION: 400; 3.5; 5 OINTMENT TOPICAL at 16:24

## 2018-03-08 RX ADMIN — Medication 100 MG: at 20:10

## 2018-03-08 RX ADMIN — METOPROLOL TARTRATE 25 MG: 25 TABLET ORAL at 20:10

## 2018-03-08 RX ADMIN — BUMETANIDE 1 MG/HR: 0.25 INJECTION INTRAMUSCULAR; INTRAVENOUS at 07:24

## 2018-03-08 RX ADMIN — LORAZEPAM 0.5 MG: 0.5 TABLET ORAL at 15:15

## 2018-03-08 RX ADMIN — ALLOPURINOL 300 MG: 300 TABLET ORAL at 08:56

## 2018-03-08 RX ADMIN — BUMETANIDE 1 MG: 0.25 INJECTION INTRAMUSCULAR; INTRAVENOUS at 18:50

## 2018-03-08 RX ADMIN — FLUOXETINE HYDROCHLORIDE 20 MG: 20 CAPSULE ORAL at 08:56

## 2018-03-08 RX ADMIN — POTASSIUM CHLORIDE 40 MEQ: 750 CAPSULE, EXTENDED RELEASE ORAL at 03:17

## 2018-03-08 RX ADMIN — PANTOPRAZOLE SODIUM 40 MG: 40 TABLET, DELAYED RELEASE ORAL at 08:56

## 2018-03-08 RX ADMIN — FOLIC ACID 1 MG: 1 TABLET ORAL at 20:10

## 2018-03-08 NOTE — PROGRESS NOTES
"[facility]  Cardiology Progress Note    Patient Identification:  Name: Anibal Freedman  Age: 68 y.o.  Sex: male  :  1950  MRN: [id number]                 Follow Up / Chief Complaint: Shortness of breath, swelling, history of CAD, permanent A. fib, systolic heart failure    Interval History:  68-year-old alcoholic with permanent A. fib, CAD and biventricular heart failure admitted with acute on chronic heart failure, ascites/anasara/ cor pulmonale, electrolyte imbalance     Subjective:  Denies chest pain, palpitations or dizziness.  \"Feel better today\".  \"Not as short of breath\".  \"Swelling a little better\"    Objective:  Static indeterminate troponin:       0.048 ->0.040->0.038->0.038  BNP 42501  -935 net fluid balance    Mg 0.9 -> 1.7  K 2.8 ->4.2    CT-> large amount of ascites    Past Medical History:  Past Medical History:   Diagnosis Date   • Alcoholism    • Arthritis    • Atrial fibrillation     pt reported   • Cellulitis    • CHF (congestive heart failure)    • Colon polyps 2006    Colonoscopy w/ snare cautery, polypectomy & biopsy, PATH:  Sigmoid Colon Biopsy: focal vascular congestion & evidence of recent hemorrhage, non-specific.  Recto-Sigmoid Colon Biopsy: Fragments of tubular adenoam w/ mild dysplasia. Dr. Mildred You   • DM2 (diabetes mellitus, type 2) 3/7/2018   • History of coronary angioplasty    • History of MRSA infection     pt reported   • History of staph infection 2005   • Hypertension    • Infectious viral hepatitis     pt reported   • Prostate cancer 2010    S/P Radical Prostectomy, Adenocarcinoma, Primary [place] Grade 3, Secondar [place] Grade 3. Combined yissel score 6. Tumor involves rt & lt lobes, negative capsular margin, no invasion of seminal vesicles, no identified perineural invastion   • Withdrawal symptoms, alcohol      Past Surgical History:  Past Surgical History:   Procedure Laterality Date   • APPENDECTOMY     • CARDIAC ABLATION " Right 01/18/2013    Atrial Flutter Ablation, Dr. Otis Srinivasan   • CARDIAC CATHETERIZATION N/A 8/2/2016    Procedure: Left Heart Cath   ?right cath too;  Surgeon: Epifanio May MD;  [facility];  Service:    • CARDIAC CATHETERIZATION N/A 8/2/2016    Procedure: Coronary angiography;  Surgeon: Epifanio May MD;  Location:  [facility] CATH INVASIVE LOCATION;  Service:    • CARDIAC CATHETERIZATION N/A 8/2/2016    Procedure: Left ventriculography;  Surgeon: Epifanio May MD;  Location:  [facility] CATH INVASIVE LOCATION;  Service:    • CARDIAC CATHETERIZATION N/A 8/2/2016    Procedure: Right Heart Cath;  Surgeon: Epifanio May MD;  [facility];  Service:    • COLONOSCOPY N/A 3/15/2017    Procedure: COLONOSCOPY TO CECUM WITH COLD BIOPSY POLYECTOMY;  Surgeon: Anibal Bower MD;  Location: [facility] ENDOSCOPY;  Service:    • COLONOSCOPY W/ BIOPSIES AND POLYPECTOMY N/A 08/21/2006    Colonoscopy w/ snare cautery, polypectomy & biopsy, PATH:  Sigmoid Colon Biopsy: focal vascular congestion & evidence of recent hemorrhage, non-specific.  Recto-Sigmoid Colon Biopsy: Fragments of tubular adenoam w/ mild dysplasia. Dr. Mildred You   • CORONARY ANGIOPLASTY WITH STENT PLACEMENT      x 2    • CYSTOSCOPY N/A 04/28/2010    Cystoscopy w/ transurethral incision of the bladder neck, Dr. ANDREW Cordova   • CYSTOTOMY N/A 09/23/2011    Laparoscopic closure of incidental cystotomy, Laparoscopic incision of pelvic lymphocele, Dr. Jaime Royal   • JOINT REPLACEMENT     • LEG DEBRIDEMENT Left 08/19/2005    Left Thigh, Debridement skin & subcutaneous tissue muscle w/ closure via advancement flaps, Dr. Mildred You   • LEG DEBRIDEMENT Left 08/08/2005    Debridement of left anterior thigh, Dr. Mildred You   • PROSTATECTOMY N/A 04/28/2010    Adenocarcinoma, Primary Yissel Grade 3, Secondar Yissel Grade 3. Combined yissel score 6. Tumor involves rt & lt  lobes, negative capsular margin, no invasion of seminal vesicles, no identified perineural invastion, Dr. Jaime Royal   • SKIN BIOPSY     • TONSILLECTOMY     • TOTAL KNEE ARTHROPLASTY Left 03/25/2008    Left total knee arthroplasty w/ [place] pinless computer navigation, Dr. Ashok Crocker   • WOUND DEBRIDEMENT Left 08/02/2005    Left Buttocks & Left thigh wounds, Debridement of left buttocks & left thigh wounds, Dr. Mildred You        Social History:   Social History   Substance Use Topics   • Smoking status: Former Smoker     Packs/day: 1.50     Quit date: 3/15/2007   • Smokeless tobacco: Not on file   • Alcohol use 3.6 oz/week     6 Cans of beer per week      Comment: 1 pint a day OR MORE OF VODKA      Family History:  Family History   Problem Relation Age of Onset   • Heart disease Mother    • Alcohol abuse Father    • Liver cancer Father    • Cancer Sister    • Hypertension Brother    • Alcohol abuse Brother    • Skin cancer Brother           Allergies:  No Known Allergies  Scheduled Meds:    allopurinol 300 mg Daily   apixaban 5 mg Q12H   bumetanide 1 mg Once   colchicine 1.2 mg BID   FLUoxetine 20 mg Daily   insulin aspart 0-9 Units 4x Daily With Meals & Nightly   LORazepam 0.5 mg Q6H   Followed by     LORazepam 0.5 mg Q8H   metoprolol tartrate 25 mg Q12H   IV Fluids 1000 mL + additives 100 mL/hr Q24H   pantoprazole 40 mg Daily           INTAKE AND OUTPUT:    Intake/Output Summary (Last 24 hours) at 03/08/18 0753  Last data filed at 03/08/18 0649   Gross per 24 hour   Intake              240 ml   Output             1175 ml   Net             -935 ml       Review of Systems:   GI:  No nausea or vomiting  Cardiac: No chest pain or dizziness  Pulmonary: Visibly less short of breath    Constitutional:  Temp:  [97.4 °F (36.3 °C)-98 °F (36.7 °C)] 97.8 °F (36.6 °C)  Heart Rate:  [78-93] 84  Resp:  [17-18] 17  BP: (128-155)/() 140/66    Physical Exam:  General:  Appears chronically ill but, in no acute  distress  Eyes: PERTL,  HEENT:  + JVD No mucosal pallor or cyanosis  Respiratory: Respirations regular with decreased use of accessory muscles at rest on supplemental O2 per nasal cannula   BBS with decreased air entry in R>L lower fields. Fewer crackles, no wheezes auscultated  Cardiovascular: S1S2 irregular rate and rhythm. No murmur or rub. 2+ (decreased) hip->LE edema  Gastrointestinal: Abdomen distended with ascites, less firm today,  non tender. Bowel sounds present.  Musculoskeletal: PEDRO x4. No abnormal movements  Extremities:  fingers and hands less dusky  Skin: Skin warm and dry to touch.   Neuro: AAO x3 CN II-XII grossly intact  Psych: Mood and affect normal, pleasant and cooperative        Cardiographics  Telemetry: afib cvr  60's, occasional PVC       ECG 3-8-18:       Echocardiogram: pending      R&L heart cath 8-2016:  HEMODYNAMIC / ANGIOGRAPHIC DATA:    .   1. Pulmonary artery systolic pressure was 55/25.  2. Right atrial pressure was 10..  3. Left ventricular end diastolic pressure was 15 mmHg.  4. The left main is normal.  5. The LAD is proximal stent had 40-50% stenosis  6. Nondominant circumflex free of any atherosclerotic narrowing.  7. The right coronary artery is dominant with a diffuse proximal 50% mid 50-60% stenosis.  8. Mild pulmonary hypertension    CXR IMPRESSION:  Borderline cardiomegaly and small left pleural effusion.    CT Abdomen IMPRESSION:  1. Small left pleural effusion.  2. Large amount of ascites.  3. Liver appears somewhat small. Please correlate for cirrhosis.     Lab Review     Results from last 7 days  Lab Units 03/08/18  0601 03/08/18  0006 03/07/18  2040   TROPONIN T ng/mL 0.038* 0.038* 0.040*     Results from last 7 days  Lab Units 03/08/18  0601   MAGNESIUM mg/dL 1.7     Results from last 7 days  Lab Units 03/08/18  0601   SODIUM mmol/L 141   POTASSIUM mmol/L 4.2   BUN mg/dL 27*   CREATININE mg/dL 1.38*   CALCIUM mg/dL 8.2*     Results from last 7 days  Lab Units  "03/07/18  1338   WBC 10*3/mm3 3.90*   HEMOGLOBIN g/dL 11.2*   HEMATOCRIT % 34.4*   PLATELETS 10*3/mm3 114*     Results from last 7 days  Lab Units 03/07/18  1639   INR  1.31*   APTT seconds 29.7         Assessment:  - a/c R>L systolic heart failure  - cor pulmonale / anasarca / ascites   - s/p hypomagnesia  - s/p hypokalemia  - CKD III   - Pancytopenia  - CAD - h/o BMS LAD, kissing balloon to adjacent diagonal,  BMS-> mid-distal RCA 1-2013  - (Permanent) atrial fibrillation -> Eliquis  - alcoholism  - h/o atrial flutter ablation 2013      Plan:  - a/c R>L systolic heart failure - No ACE-I or ARB due to CKD. Symptomatic improvement on Bumex - defer further diuretic management to nephrology.  Review echo.    - cor pulmonale / anasarca / ascites -   On Bumex - defer further diuretic management to nephrology.  Interventional radiology to evaluate CT and need for possible paracentesis.  GI to see for suspected cirrhosis    - CAD - No chest pain.  MI ruled out.  H/o LAD& RCA PCI & diagonal PTCA 2013. Non obstructive CAD per cath 2016.  Ischemic workup once medically stable    - (Permanent) atrial fibrillation -> on Eliquis. Rate controlled. H/o atrial flutter ablation 2013    On Bumex for now, but would defer further diuretic management to Nephrology.  Interventional radiology to evaluate CT and need for possible paracentesis.  GI to see for suspected cirrhosis.  EtOH withdrawal as per IM. Review 2-D echo.  Ischemic workup once medically stable    Labs/tests ordered: IR and GI cons, medication adjustment, BMP      I reviewed the patient's new clinical results and treatment plan   I personally viewed and interpreted the patient's EKG/Telemetry data    )3/8/2018  Epifanio May MD      EMR Dragon/Transcription:   \"Dictated utilizing Dragon dictation\".     "

## 2018-03-08 NOTE — CONSULTS
CONSULT NOTE    INTERNAL MEDICINE   [facility]       Patient Identification:  Name: Anibal Freedman  Age: 68 y.o.  Sex: male  :  1950  MRN: [id number]             Date of Consultation: 3/7/2018       Primary Care Physician: Moustapha Long MD                               Requesting Physician: Dr. May  Reason for Consultation: Management of diabetes and other medical issues.    Chief Complaint: Progressive weight gain swelling of the legs and increasing abdominal girth for the last few days.    History of Present Illness:   Patient is a 68-year-old male with past medical history as noted below was admitted and the cardiology service because of progressive congestive heart failure and increasing edema.  Patient actually goes to Dr. Jewell for his heart failure issues.  Dr. Angel associates do not come to [facility] and hence patient was admitted under Dr. May.  According to the patient he feels otherwise okay denies any significant shortness of breath is major issues which can swelling of his legs and increasing abdominal distention.  Patient denies any fever or denies any chills.  Says that his appetite is okay and he denies any significant orthopnea or PND.      Past Medical History:  Past Medical History:   Diagnosis Date   • Alcoholism    • Arthritis    • Atrial fibrillation     pt reported   • Cellulitis    • CHF (congestive heart failure)    • Colon polyps 2006    Colonoscopy w/ snare cautery, polypectomy & biopsy, PATH:  Sigmoid Colon Biopsy: focal vascular congestion & evidence of recent hemorrhage, non-specific.  Recto-Sigmoid Colon Biopsy: Fragments of tubular adenoam w/ mild dysplasia. Dr. Mildred You   • DM2 (diabetes mellitus, type 2) 3/7/2018   • History of coronary angioplasty    • History of MRSA infection     pt reported   • History of staph infection 2005   • Hypertension    • Infectious viral hepatitis     pt reported   •  Prostate cancer 04/28/2010    S/P Radical Prostectomy, Adenocarcinoma, Primary Yissel Grade 3, Secondar [place] Grade 3. Combined yissel score 6. Tumor involves rt & lt lobes, negative capsular margin, no invasion of seminal vesicles, no identified perineural invastion   • Withdrawal symptoms, alcohol      Past Surgical History:  Past Surgical History:   Procedure Laterality Date   • APPENDECTOMY     • CARDIAC ABLATION Right 01/18/2013    Atrial Flutter Ablation, Dr. Otis Srinivasan   • CARDIAC CATHETERIZATION N/A 8/2/2016    Procedure: Left Heart Cath   ?right cath too;  Surgeon: Epifanio May MD;  [facility]AS[facility];  Service:    • CARDIAC CATHETERIZATION N/A 8/2/2016    Procedure: Coronary angiography;  Surgeon: Epifanio May MD;  Location[facility] LOC[facility];  Service:    • CARDIAC CATHETERIZATION N/A 8/2/2016    Procedure: Left ventriculography;  Surgeon: Epifanio May MD;  [facility] LOC[facility];  Service:    • CARDIAC CATHETERIZATION N/A 8/2/2016    Procedure: Right Heart Cath;  Surgeon: Epifanio May MD;  [facility];  Service:    • COLONOSCOPY N/A 3/15/2017    Procedure: COLONOSCOPY TO CECUM WITH COLD BIOPSY POLYECTOMY;  Surgeon: Anibal Bower MD;  Location: [facility] ENDOSCOPY;  Service:    • COLONOSCOPY W/ BIOPSIES AND POLYPECTOMY N/A 08/21/2006    Colonoscopy w/ snare cautery, polypectomy & biopsy, PATH:  Sigmoid Colon Biopsy: focal vascular congestion & evidence of recent hemorrhage, non-specific.  Recto-Sigmoid Colon Biopsy: Fragments of tubular adenoam w/ mild dysplasia. Dr. Mildred You   • CORONARY ANGIOPLASTY WITH STENT PLACEMENT      x 2    • CYSTOSCOPY N/A 04/28/2010    Cystoscopy w/ transurethral incision of the bladder neck, Dr. ANDREW Cordova   • CYSTOTOMY N/A 09/23/2011    Laparoscopic closure of incidental cystotomy, Laparoscopic incision of pelvic lymphocele, Dr. Sandoval  Lele   • JOINT REPLACEMENT     • LEG DEBRIDEMENT Left 08/19/2005    Left Thigh, Debridement skin & subcutaneous tissue muscle w/ closure via advancement flaps, Dr. Mildred You   • LEG DEBRIDEMENT Left 08/08/2005    Debridement of left anterior thigh, Dr. Mildred You   • PROSTATECTOMY N/A 04/28/2010    Adenocarcinoma, Primary [place] Grade 3, Secondar Yissel Grade 3. Combined yissel score 6. Tumor involves rt & lt lobes, negative capsular margin, no invasion of seminal vesicles, no identified perineural invastion, Dr. Jaime Royal   • SKIN BIOPSY     • TONSILLECTOMY     • TOTAL KNEE ARTHROPLASTY Left 03/25/2008    Left total knee arthroplasty w/ [employer] pinless computer navigation, Dr. Ashok Crocker   • WOUND DEBRIDEMENT Left 08/02/2005    Left Buttocks & Left thigh wounds, Debridement of left buttocks & left thigh wounds, Dr. Mildred You      Home Meds:  Prescriptions Prior to Admission   Medication Sig Dispense Refill Last Dose   • allopurinol (ZYLOPRIM) 300 MG tablet Take 300 mg by mouth Daily.   3/13/2017   • apixaban (ELIQUIS) 5 MG tablet tablet Take 5 mg by mouth 2 (two) times a day.   3/13/2017   • bumetanide (BUMEX) 2 MG tablet Take 2 mg by mouth 2 (two) times a day.   3/13/2017   • colchicine 0.6 MG tablet Take 1.2 mg by mouth 2 (Two) Times a Day.      • FLUoxetine (PROzac) 20 MG capsule Take 20 mg by mouth daily.   3/13/2017   • ferrous sulfate 325 (65 FE) MG tablet Take 325 mg by mouth Daily With Breakfast.   3/9/2017   • metoprolol tartrate (LOPRESSOR) 25 MG tablet Take 1 tablet by mouth every 12 (twelve) hours. 60 tablet 0 3/13/2017   • pantoprazole (PROTONIX) 40 MG EC tablet Take 40 mg by mouth daily.   3/13/2017   • potassium chloride (K-DUR,KLOR-CON) 20 MEQ CR tablet Take 20 mEq by mouth 2 (Two) Times a Day.   3/13/2017     Current Meds:     Current Facility-Administered Medications:   •  acetaminophen (TYLENOL) tablet 650 mg, 650 mg, Oral, Q4H PRN, Zakiya Valverde, APRN  •  allopurinol  (ZYLOPRIM) tablet 300 mg, 300 mg, Oral, Daily, FRANK Moreno, 300 mg at 03/07/18 2117  •  apixaban (ELIQUIS) tablet 5 mg, 5 mg, Oral, Q12H, FRANK Moreno, 5 mg at 03/07/18 2117  •  bumetanide (BUMEX) 10 mg in sodium chloride 0.9 % 100 mL (0.1 mg/mL) infusion, 1 mg/hr, Intravenous, Continuous, Epifanio May MD  •  bumetanide (BUMEX) injection 1 mg, 1 mg, Intravenous, Once, FRANK Moreno  •  colchicine tablet 1.2 mg, 1.2 mg, Oral, BID, FRANK Moreno, 1.2 mg at 03/07/18 2117  •  FLUoxetine (PROzac) capsule 20 mg, 20 mg, Oral, Daily, FRANK Moreno, 20 mg at 03/07/18 2117  •  LORazepam (ATIVAN) tablet 0.5 mg, 0.5 mg, Oral, Q6H, 0.5 mg at 03/07/18 2118 **FOLLOWED BY** [START ON 3/8/2018] LORazepam (ATIVAN) tablet 0.5 mg, 0.5 mg, Oral, Q8H, Gal Randall MD  •  metoprolol tartrate (LOPRESSOR) tablet 25 mg, 25 mg, Oral, Q12H, FRANK Moreno, 25 mg at 03/07/18 2117  •  multiple vitamin (M.V.I. Adult) 10 mL, thiamine (B-1) 100 mg, folic acid 1 mg, magnesium sulfate 2 g in sodium chloride 0.9 % 1,000 mL infusion, 100 mL/hr, Intravenous, Q24H, Gal Randall MD, Last Rate: 100 mL/hr at 03/07/18 2117, 100 mL/hr at 03/07/18 2117  •  pantoprazole (PROTONIX) EC tablet 40 mg, 40 mg, Oral, Daily, FRANK Moreno, 40 mg at 03/07/18 2118  •  potassium chloride (MICRO-K) CR capsule 40 mEq, 40 mEq, Oral, PRN, Epifanio May MD, 40 mEq at 03/07/18 1907  •  sodium chloride 0.9 % flush 1-10 mL, 1-10 mL, Intravenous, PRN, Zakiya Valverde, APRN  Allergies:  No Known Allergies  Social History:   Social History   Substance Use Topics   • Smoking status: Former Smoker     Packs/day: 1.50     Quit date: 3/15/2007   • Smokeless tobacco: Not on file   • Alcohol use 3.6 oz/week     6 Cans of beer per week      Comment: 1 pint a day OR MORE OF VODKA      Family History:  Family History   Problem Relation Age of Onset   • Heart disease Mother    • Alcohol abuse Father    • Liver cancer Father    • Cancer Sister  "   • Hypertension Brother    • Alcohol abuse Brother    • Skin cancer Brother           Review of Systems  See history of present illness and past medical history..  Constitutional: Negative for activity change, appetite change and fever.   HENT: Negative for congestion and sore throat.    Eyes: Negative.    Respiratory: Positive for shortness of breath (on exertion). Negative for cough.    Cardiovascular: Positive for leg swelling (BLE). Negative for chest pain.   Gastrointestinal: Positive for abdominal distention. Negative for abdominal pain, diarrhea and vomiting.   Endocrine: Negative.    Genitourinary: Negative for decreased urine volume and dysuria.   Musculoskeletal: Negative for neck pain.   Skin: Negative for rash and wound.   Allergic/Immunologic: Negative.    Neurological: Negative for weakness, numbness and headaches.   Hematological: Negative.      Vitals:   /99 (BP Location: Left arm, Patient Position: Lying)  Pulse 93  Temp 97.4 °F (36.3 °C) (Oral)   Resp 17  Ht 180.3 cm (71\")  Wt 99.8 kg (220 lb)  SpO2 98%  BMI 30.68 kg/m2  I/O:   Intake/Output Summary (Last 24 hours) at 03/07/18 2209  Last data filed at 03/07/18 2108   Gross per 24 hour   Intake              240 ml   Output              200 ml   Net               40 ml     Exam:  General Appearance:    Alert, cooperative, no distress, appears Older than his stated age   Head:    Normocephalic, without obvious abnormality, atraumatic   Eyes:    PERRL, conjunctiva/corneas clear, EOM's intact, both eyes   Ears:    Normal external ear canals, both ears   Nose:   Nares normal, septum midline, mucosa normal, no drainage    or sinus tenderness   Throat:   Lips, tongue, gums normal; oral mucosa pink and moist   Neck:   Supple, symmetrical, trachea midline, no adenopathy;     thyroid:  no enlargement/tenderness/nodules; no carotid    bruit or JVD   Back:     Symmetric, no curvature, ROM normal, no CVA tenderness   Lungs:     Decreased breath " sounds at the bases.     Chest Wall:    No tenderness or deformity    Heart:    Irregularly irregular, S1 and S2 normal, no murmur, rub   or gallop   Abdomen:     Soft, Increasing abdominal distention with body wall edema non-tender, bowel sounds active all four quadrants,     no masses, no hepatomegaly, no splenomegaly   Extremities:   Bilateral 3+ lower extremity edema noted he has abrasion on the toes and a scabbed lesion on the left foot.     Pulses:   Pulses palpable in all extremities; symmetric all extremities   Skin:   Skin color normal, Skin is warm and dry,  no rashes or palpable lesions   Neurologic:   CNII-XII intact, motor strength grossly intact, sensation grossly intact to light touch, no focal deficits noted       Data Review:      I reviewed the patient's new clinical results.    Results from last 7 days  Lab Units 03/07/18  1338   WBC 10*3/mm3 3.90*   HEMOGLOBIN g/dL 11.2*   PLATELETS 10*3/mm3 114*       Results from last 7 days  Lab Units 03/07/18  1338   SODIUM mmol/L 141   POTASSIUM mmol/L 2.8*   CHLORIDE mmol/L 93*   CO2 mmol/L 34.0*   BUN mg/dL 25*   CREATININE mg/dL 1.35*   CALCIUM mg/dL 8.7   GLUCOSE mg/dL 90       Assessment:  Active Hospital Problems (** Indicates Principal Problem)    Diagnosis Date Noted   • **CHF exacerbation [I50.9] 07/23/2016   • Right heart failure [I50.810] 03/07/2018   • Ascites of liver [R18.8] 03/07/2018   • Hypertension [I10] 03/07/2018   • Atrial fibrillation [I48.91] 03/07/2018     pt reported     • Alcoholism [F10.20] 03/07/2018   • History of coronary angioplasty [Z98.61] 03/07/2018   • DM2 (diabetes mellitus, type 2) [E11.9] 03/07/2018   • Pancytopenia [D61.818] 03/07/2018   • Chronic liver disease [K76.9] 03/07/2018   • Elevated troponin [R74.8] 03/07/2018   • CKD (chronic kidney disease) [N18.9] 03/07/2018      Resolved Hospital Problems    Diagnosis Date Noted Date Resolved   No resolved problems to display.       Plan:  Acute exacerbation of underlying  chronic diastolic congestive heart failure with pulmonary hypertension - patient on dopamine and Bumex per cardiology service.  Pancytopenia likely due to underlying chronic liver disease due to alcohol abuse.- Monitor his counts  Diabetes mellitus type 2-check Accu-Cheks and sliding scale coverage check hemoglobin A1c.  Chronic kidney disease likely to get worse with diuretics monitor and consider nephrology evaluation.  Elevated troponin multifactorial management per cardiology service  Chronic liver disease due to chronic alcohol use overall stable.    Soniya Thomas MD   3/7/2018  10:09 PM  Much of this encounter note is an electronic transcription/translation of spoken language to printed text. The electronic translation of spoken language may permit erroneous, or at times, nonsensical words or phrases to be inadvertently transcribed; Although I have reviewed the note for such errors, some may still exist

## 2018-03-08 NOTE — NURSING NOTE
Pt seen for small wound on top of right great toe; pt states he scraped his toe about 3 weeks ago; pt is diabetic and by looks of his feet doesn't take very good care of them.  .5 x .5cm partial thickness abrasion on toe, clean and pink after dried scabbed easily removed.  No s/s of infection noted.  Instructed pt in importance of taking better care of his feet and need to cleanse wound daily, apply antibioitc ointment and bandaid until heals.  Pt sleepy but does state understanding.  Discussed tx plan with staff RN, Denzel

## 2018-03-08 NOTE — CONSULTS
[facility]  Initial Inpatient Consult Note    Referring Provider: FRANK Hopson    Reason for Consultation: cirrhosis, ascites    Subjective     History of present illness:  68-year-old man with a past medical history as below including stage III chronic kidney disease and CHF who was admitted by cardiology for about 2 weeks of increasing lower extremity edema, abdominal distention, and shortness of breath.  CT imaging demonstrated ascites and a cirrhotic appearing liver for which gastroenterology has been consulted.  He reports that he otherwise was in his normal state of health prior to this.  He reports that he always had a bit of a belly but it has gotten progressively larger over this period of time.  It has been associated with shortness of breath and some difficulty ambulating up and down stairs.  He has been told previously of his liver disease upon my review of his notes from his primary care physician and was advised to stop alcohol.  He has been an alcoholic for many years and drinks at least a pint of alcohol daily.  He did try inpatient rehabilitation at the [facility] previously and was able to maintain sobriety for about 72 days.  He denies any jaundice or right upper quadrant pain.  His father did have a history of a liver tumor but he was not an alcoholic.  There are no other GI malignancies in his family.  He reports that he was told of a possible hepatitis B infection in the past but was told that he needed no treatment for it.  He has been on diuretics per his cardiologist but this has not changed the edema or abdominal distension.      He is a retired .  No smoking. Lives with his wife.      Past Medical History:  Past Medical History:   Diagnosis Date   • Alcoholism    • Arthritis    • Atrial fibrillation     pt reported   • Cellulitis    • CHF (congestive heart failure)    • Colon polyps 08/21/2006    Colonoscopy w/ snare cautery, polypectomy & biopsy,  PATH:  Sigmoid Colon Biopsy: focal vascular congestion & evidence of recent hemorrhage, non-specific.  Recto-Sigmoid Colon Biopsy: Fragments of tubular adenoam w/ mild dysplasia. Dr. Mildred You   • DM2 (diabetes mellitus, type 2) 3/7/2018   • History of coronary angioplasty    • History of MRSA infection 2005    pt reported   • History of staph infection 08/02/2005   • Hypertension    • Infectious viral hepatitis     pt reported   • Prostate cancer 04/28/2010    S/P Radical Prostectomy, Adenocarcinoma, Primary [place] Grade 3, Secondar [place] Grade 3. Combined yissel score 6. Tumor involves rt & lt lobes, negative capsular margin, no invasion of seminal vesicles, no identified perineural invastion   • Withdrawal symptoms, alcohol        Past Surgical History:  Past Surgical History:   Procedure Laterality Date   • APPENDECTOMY     • CARDIAC ABLATION Right 01/18/2013    Atrial Flutter Ablation, Dr. Otis Srinivasan   • CARDIAC CATHETERIZATION N/A 8/2/2016    Procedure: Left Heart Cath   ?right cath too;  Surgeon: Epifanio May MD;  Location: [facility] CATH INVASIVE LOCATION;  Service:    • CARDIAC CATHETERIZATION N/A 8/2/2016    Procedure: Coronary angiography;  Surgeon: Epifanio May MD;  Location: [facility] CATH INVASIVE LOCATION;  Service:    • CARDIAC CATHETERIZATION N/A 8/2/2016    Procedure: Left ventriculography;  Surgeon: Epifanio May MD;  Location: [facility] CATH INVASIVE LOCATION;  Service:    • CARDIAC CATHETERIZATION N/A 8/2/2016    Procedure: Right Heart Cath;  Surgeon: Epifanio May MD;  Location: [facility] CATH INVASIVE LOCATION;  Service:    • COLONOSCOPY N/A 3/15/2017    Procedure: COLONOSCOPY TO CECUM WITH COLD BIOPSY POLYECTOMY;  Surgeon: Anibal Bower MD;  Location: [facility] ENDOSCOPY;  Service:    • COLONOSCOPY W/ BIOPSIES AND POLYPECTOMY N/A 08/21/2006    Colonoscopy w/ snare cautery, polypectomy & biopsy, PATH:  Sigmoid Colon Biopsy: focal vascular congestion & evidence  of recent hemorrhage, non-specific.  Recto-Sigmoid Colon Biopsy: Fragments of tubular adenoam w/ mild dysplasia. Dr. Mildred You   • CORONARY ANGIOPLASTY WITH STENT PLACEMENT      x 2    • CYSTOSCOPY N/A 04/28/2010    Cystoscopy w/ transurethral incision of the bladder neck, Dr. ANDREW Cordova   • CYSTOTOMY N/A 09/23/2011    Laparoscopic closure of incidental cystotomy, Laparoscopic incision of pelvic lymphocele, Dr. Jaime Royal   • JOINT REPLACEMENT     • LEG DEBRIDEMENT Left 08/19/2005    Left Thigh, Debridement skin & subcutaneous tissue muscle w/ closure via advancement flaps, Dr. Mildred You   • LEG DEBRIDEMENT Left 08/08/2005    Debridement of left anterior thigh, Dr. Mildred You   • PROSTATECTOMY N/A 04/28/2010    Adenocarcinoma, Primary [place] Grade 3, Secondar [place] Grade 3. Combined yissel score 6. Tumor involves rt & lt lobes, negative capsular margin, no invasion of seminal vesicles, no identified perineural invastion, Dr. Jaime Royal   • SKIN BIOPSY     • TONSILLECTOMY     • TOTAL KNEE ARTHROPLASTY Left 03/25/2008    Left total knee arthroplasty w/ Juan pinless computer navigation, Dr. Ashok Crocker   • WOUND DEBRIDEMENT Left 08/02/2005    Left Buttocks & Left thigh wounds, Debridement of left buttocks & left thigh wounds, Dr. Mildred You        Social History:   Social History   Substance Use Topics   • Smoking status: Former Smoker     Packs/day: 1.50     Quit date: 3/15/2007   • Smokeless tobacco: Not on file   • Alcohol use 3.6 oz/week     6 Cans of beer per week      Comment: 1 pint a day OR MORE OF VODKA        Family History:  Family History   Problem Relation Age of Onset   • Heart disease Mother    • Alcohol abuse Father    • Liver cancer Father    • Cancer Sister    • Hypertension Brother    • Alcohol abuse Brother    • Skin cancer Brother        Home Meds:  Prescriptions Prior to Admission   Medication Sig Dispense Refill Last Dose   • allopurinol (ZYLOPRIM) 300 MG  tablet Take 300 mg by mouth Daily.   3/13/2017   • apixaban (ELIQUIS) 5 MG tablet tablet Take 5 mg by mouth 2 (two) times a day.   3/13/2017   • bumetanide (BUMEX) 2 MG tablet Take 2 mg by mouth 2 (two) times a day.   3/13/2017   • colchicine 0.6 MG tablet Take 1.2 mg by mouth 2 (Two) Times a Day.      • FLUoxetine (PROzac) 20 MG capsule Take 20 mg by mouth daily.   3/13/2017   • ferrous sulfate 325 (65 FE) MG tablet Take 325 mg by mouth Daily With Breakfast.   3/9/2017   • metoprolol tartrate (LOPRESSOR) 25 MG tablet Take 1 tablet by mouth every 12 (twelve) hours. 60 tablet 0 3/13/2017   • pantoprazole (PROTONIX) 40 MG EC tablet Take 40 mg by mouth daily.   3/13/2017   • potassium chloride (K-DUR,KLOR-CON) 20 MEQ CR tablet Take 20 mEq by mouth 2 (Two) Times a Day.   3/13/2017       Current Meds:     allopurinol 300 mg Oral Daily   apixaban 5 mg Oral Q12H   bumetanide 1 mg Intravenous Q12H   colchicine 1.2 mg Oral BID   FLUoxetine 20 mg Oral Daily   insulin aspart 0-9 Units Subcutaneous 4x Daily With Meals & Nightly   LORazepam 0.5 mg Oral Q6H   Followed by      LORazepam 0.5 mg Oral Q8H   metoprolol tartrate 25 mg Oral Q12H   IV Fluids 1000 mL + additives 100 mL/hr Intravenous Q24H   pantoprazole 40 mg Oral Daily       Allergies:  No Known Allergies    Review of Systems  All systems were reviewed and negative except for:  Respiratory: positive for  shortness of air  Cardiovascular: positive for  lower extremity edema  Gastrointestinal: postitive for  abdominal distension     Objective     Vital Signs  Temp:  [97.3 °F (36.3 °C)-98 °F (36.7 °C)] 97.3 °F (36.3 °C)  Heart Rate:  [68-93] 68  Resp:  [16-18] 16  BP: (121-155)/() 121/88    Physical Exam:  Constitutional:    Alert, cooperative, in no acute distress, appears stated age, chronically ill appearing   Eyes:            Lids and lashes normal, conjunctivae and sclerae normal, no   icterus   Ears, nose, mouth and throat:   Normal appearance of external ears  and nose, no oral lesions, no thrush, oral mucosa moist   Respiratory:     Clear to auscultation, respirations regular, even and                   unlabored    Cardiovascular:    Regular rhythm and normal rate, normal S1 and S2, no            murmur, no gallop, palpable distal pulses, 2+ lower extremity edema   Gastrointestinal:    Firm, significantly distended, non-tender to palpation, no guarding, no rebound tenderness, normal bowel sounds, no palpable masses or organomegaly  Rectal exam: deferred   Musculoskeletal:   Normal station, atrophy of UEs, no tenderness to palpation, normal digits and nails   Skin:   Normal color, no bleeding, bruising, rashes or lesions   Lymphatics:   No palpable cervical or supraclavicular adenopathy   Psychiatric:  Judgement and insight: normal   Orientation to person, place and time: normal   Mood and affect: normal       Results Review:   I reviewed the patient's new clinical results.      Results from last 7 days  Lab Units 03/07/18  1338   WBC 10*3/mm3 3.90*   HEMOGLOBIN g/dL 11.2*   HEMATOCRIT % 34.4*   PLATELETS 10*3/mm3 114*         Results from last 7 days  Lab Units 03/08/18  0601 03/08/18  0116 03/07/18  1338   SODIUM mmol/L 141  --  141   POTASSIUM mmol/L 4.2 3.4* 2.8*   CHLORIDE mmol/L 96*  --  93*   CO2 mmol/L 32.5*  --  34.0*   BUN mg/dL 27*  --  25*   CREATININE mg/dL 1.38*  --  1.35*   CALCIUM mg/dL 8.2*  --  8.7   BILIRUBIN mg/dL 1.4*  --  1.5*   ALK PHOS U/L 91  --  98   ALT (SGPT) U/L 8  --  8   AST (SGOT) U/L 20  --  19   GLUCOSE mg/dL 93  --  90         Results from last 7 days  Lab Units 03/07/18  1639   INR  1.31*       No results found for: LIPASE    Radiology:  Imaging Results (last 72 hours)     Procedure Component Value Units Date/Time    CT Abdomen Pelvis Without Contrast [[id number]] Collected:  03/07/18 1938     Updated:  03/08/18 0759    Narrative:       CT OF THE ABDOMEN AND PELVIS WITHOUT CONTRAST 3/7/2018      HISTORY: Abdominal swelling.      TECHNIQUE: Axial images were obtained from the lung bases to the  symphysis pubis. No intravenous contrast was given.     FINDINGS: Small left pleural effusion is seen.     Large amount of ascites is seen in the abdomen and pelvis.     The liver appears somewhat small and slightly irregular in contour  consistent with cirrhosis. Please correlate. Spleen does not appear  enlarged. The gallbladder, pancreas, adrenals and kidneys appear  unremarkable.     No bowel wall thickening or bowel dilatation is seen.       Impression:       1. Small left pleural effusion.  2. Large amount of ascites.  3. Liver appears somewhat small. Please correlate for cirrhosis.     Radiation dose reduction techniques were utilized, including automated  exposure control and exposure modulation based on body size.     This report was finalized on 3/8/2018 7:56 AM by Dr. Zachery Ngo MD.       XR Chest 2 View [[id number]] Collected:  03/07/18 1738     Updated:  03/08/18 0759    Narrative:       PA AND LATERAL CHEST 3/7/2018      HISTORY: Shortness of breath. HIstory of CHF.     FINDINGS: Heart size is at the upper limits of normal.     There is a small left pleural effusion. Tiny probable calcified  granuloma seen in the left upper lobe.     Lungs appear otherwise clear.     No pneumothorax is seen.       Impression:       Borderline cardiomegaly and small left pleural effusion.     This report was finalized on 3/8/2018 7:56 AM by Dr. Zachery Ngo MD.             Assessment/Plan     Principal Problem:    CHF exacerbation  Active Problems:    Right heart failure    Ascites of liver    Hypertension    Atrial fibrillation    Alcoholism    History of coronary angioplasty    DM2 (diabetes mellitus, type 2)    Pancytopenia    Chronic liver disease    Elevated troponin    CKD (chronic kidney disease)    Hypomagnesemia    Hypokalemia      Impression  1. Cirrhosis: most likely alcoholic but ? Hep b history and could also be cardiogenic,  nephrogenic  2. Ascites: large volume on CT imaging  3. Alcohol abuse: ongoing  4. CHF: followed by cardiology  5. A fib: on eliquis  6. CKD: stage III    Plan  -paracentesis with fluid studies to tease out etiology of cirrhosis  -low sodium diet  -6 small meals daily  -adjust diuretic therapy based on results of ascites studies  -check AFP level  -needs complete alcohol cessation  -will need EGD for variceal surveillance as an outpt  -q 6 month liver imaging  -close follow up in our office    I discussed the patients findings and my recommendations with patient    Marlen Benavides MD  [facility]      Dictated utilizing Dragon dictation

## 2018-03-08 NOTE — CONSULTS
[facility]                                                                                             Nephrology Initial Consult Note    Patient Identification:  Name: Anibal Freedman MRN: [id number]  Age: 68 y.o. : 1950  Sex: male  Date:3/8/2018    Requesting Physician: As per consult order.  Reason for Consultation: Chronic kidney disease, diuretic management  Information from:patient/ family/ chart      History of Present Illness: This is a 68 y.o. year old male  with stage III chronic kidney disease, he is been seen by me previously in the hospital for evaluation and management of chronic kidney disease in the setting of congestive heart failure.  His baseline creatinine per prior labs appears to be between 1.2 and 1.4.  He was admitted to the cardiology services today with progressive lower extremity edema, mild shortness of breath associated with dyspnea with exertion.  No cough, fevers, congestion.  He states compliance with low salt diet but not with a fluid restriction.  He is also an alcoholic and drinks 1 pint of alcohol a day.  He has complained of this increasing abdominal distention, good appetite with no nausea or vomiting.  He was abnormal followed by [facility].  He has a atrial fibrillation on Eliquis and diastolic congestive heart failure associated with chronic kidney disease.  Also history of prostate cancer followed by oncology and history of gouty arthritis.  He also reports some paroxysmal nocturnal dyspnea, orthopnea but no palpitations, no change in urination or dysuria.      The following medical history and medications personally reviewed by me:    Problem List:   Patient Active Problem List    Diagnosis   • Right heart failure [I50.810]   • Ascites of liver [R18.8]   • Hypertension [I10]   • Atrial fibrillation [I48.91]     Overview Note:     pt reported     •  Alcoholism [F10.20]   • History of coronary angioplasty [Z98.61]   • DM2 (diabetes mellitus, type 2) [E11.9]   • Pancytopenia [D61.818]   • Chronic liver disease [K76.9]   • Elevated troponin [R74.8]   • CKD (chronic kidney disease) [N18.9]   • CHF exacerbation [I50.9]       Past Medical History:  Past Medical History:   Diagnosis Date   • Alcoholism    • Arthritis    • Atrial fibrillation     pt reported   • Cellulitis    • CHF (congestive heart failure)    • Colon polyps 08/21/2006    Colonoscopy w/ snare cautery, polypectomy & biopsy, PATH:  Sigmoid Colon Biopsy: focal vascular congestion & evidence of recent hemorrhage, non-specific.  Recto-Sigmoid Colon Biopsy: Fragments of tubular adenoam w/ mild dysplasia. Dr. Mildred You   • DM2 (diabetes mellitus, type 2) 3/7/2018   • History of coronary angioplasty    • History of MRSA infection 2005    pt reported   • History of staph infection 08/02/2005   • Hypertension    • Infectious viral hepatitis     pt reported   • Prostate cancer 04/28/2010    S/P Radical Prostectomy, Adenocarcinoma, Primary [place] Grade 3, Secondar Yissel Grade 3. Combined yissel score 6. Tumor involves rt & lt lobes, negative capsular margin, no invasion of seminal vesicles, no identified perineural invastion   • Withdrawal symptoms, alcohol        Past Surgical History:  Past Surgical History:   Procedure Laterality Date   • APPENDECTOMY     • CARDIAC ABLATION Right 01/18/2013    Atrial Flutter Ablation, Dr. Otis Srinivasan   • CARDIAC CATHETERIZATION N/A 8/2/2016    Procedure: Left Heart Cath   ?right cath too;  Surgeon: Epifanio May MD;  Location: [facility] CATH INVASIVE LOCATION;  Service:    • CARDIAC CATHETERIZATION N/A 8/2/2016    Procedure: Coronary angiography;  Surgeon: Epifanio May MD;  Location: [facility] CATH INVASIVE LOCATION;  Service:    • CARDIAC CATHETERIZATION N/A 8/2/2016    Procedure: Left ventriculography;  Surgeon: Epifanio May MD;  Location: [facility]  CATH INVASIVE LOCATION;  Service:    • CARDIAC CATHETERIZATION N/A 8/2/2016    Procedure: Right Heart Cath;  Surgeon: Epifanio May MD;  Location: [facility] CATH INVASIVE LOCATION;  Service:    • COLONOSCOPY N/A 3/15/2017    Procedure: COLONOSCOPY TO CECUM WITH COLD BIOPSY POLYECTOMY;  Surgeon: Anibal Bower MD;  Location: [facility] ENDOSCOPY;  Service:    • COLONOSCOPY W/ BIOPSIES AND POLYPECTOMY N/A 08/21/2006    Colonoscopy w/ snare cautery, polypectomy & biopsy, PATH:  Sigmoid Colon Biopsy: focal vascular congestion & evidence of recent hemorrhage, non-specific.  Recto-Sigmoid Colon Biopsy: Fragments of tubular adenoam w/ mild dysplasia. Dr. Mildred You   • CORONARY ANGIOPLASTY WITH STENT PLACEMENT      x 2    • CYSTOSCOPY N/A 04/28/2010    Cystoscopy w/ transurethral incision of the bladder neck, Dr. ANDREW Cordova   • CYSTOTOMY N/A 09/23/2011    Laparoscopic closure of incidental cystotomy, Laparoscopic incision of pelvic lymphocele, Dr. Jaime Royal   • JOINT REPLACEMENT     • LEG DEBRIDEMENT Left 08/19/2005    Left Thigh, Debridement skin & subcutaneous tissue muscle w/ closure via advancement flaps, Dr. Mildred You   • LEG DEBRIDEMENT Left 08/08/2005    Debridement of left anterior thigh, Dr. Mildred You   • PROSTATECTOMY N/A 04/28/2010    Adenocarcinoma, Primary Yissel Grade 3, Secondar Yissel Grade 3. Combined yissel score 6. Tumor involves rt & lt lobes, negative capsular margin, no invasion of seminal vesicles, no identified perineural invastion, Dr. Jaime Royal   • SKIN BIOPSY     • TONSILLECTOMY     • TOTAL KNEE ARTHROPLASTY Left 03/25/2008    Left total knee arthroplasty w/ Juan pinless computer navigation, Dr. Ashok Crocker   • WOUND DEBRIDEMENT Left 08/02/2005    Left Buttocks & Left thigh wounds, Debridement of left buttocks & left thigh wounds, Dr. Mildred You        Home Meds:   Prescriptions Prior to Admission   Medication Sig Dispense Refill Last Dose   •  allopurinol (ZYLOPRIM) 300 MG tablet Take 300 mg by mouth Daily.   3/13/2017   • apixaban (ELIQUIS) 5 MG tablet tablet Take 5 mg by mouth 2 (two) times a day.   3/13/2017   • bumetanide (BUMEX) 2 MG tablet Take 2 mg by mouth 2 (two) times a day.   3/13/2017   • colchicine 0.6 MG tablet Take 1.2 mg by mouth 2 (Two) Times a Day.      • FLUoxetine (PROzac) 20 MG capsule Take 20 mg by mouth daily.   3/13/2017   • ferrous sulfate 325 (65 FE) MG tablet Take 325 mg by mouth Daily With Breakfast.   3/9/2017   • metoprolol tartrate (LOPRESSOR) 25 MG tablet Take 1 tablet by mouth every 12 (twelve) hours. 60 tablet 0 3/13/2017   • pantoprazole (PROTONIX) 40 MG EC tablet Take 40 mg by mouth daily.   3/13/2017   • potassium chloride (K-DUR,KLOR-CON) 20 MEQ CR tablet Take 20 mEq by mouth 2 (Two) Times a Day.   3/13/2017       Current Meds:   Current Facility-Administered Medications   Medication Dose Route Frequency Provider Last Rate Last Dose   • acetaminophen (TYLENOL) tablet 650 mg  650 mg Oral Q4H PRN FRANK Moreno       • allopurinol (ZYLOPRIM) tablet 300 mg  300 mg Oral Daily Zakiya Valverde APRN   300 mg at 03/07/18 2117   • apixaban (ELIQUIS) tablet 5 mg  5 mg Oral Q12H Zakiya Valverde APRN   5 mg at 03/07/18 2117   • bumetanide (BUMEX) 10 mg in sodium chloride 0.9 % 100 mL (0.1 mg/mL) infusion  1 mg/hr Intravenous Continuous Epifanio May MD 10 mL/hr at 03/08/18 0724 1 mg/hr at 03/08/18 0724   • bumetanide (BUMEX) injection 1 mg  1 mg Intravenous Once Zakiya Valverde APRN       • colchicine tablet 1.2 mg  1.2 mg Oral BID Zakiya Valverde APRN   1.2 mg at 03/07/18 2117   • dextrose (D50W) solution 25 g  25 g Intravenous Q15 Min PRN Soniya Thomas MD       • dextrose (GLUTOSE) oral gel 15 g  15 g Oral Q15 Min PRN Soniya Thomas MD       • FLUoxetine (PROzac) capsule 20 mg  20 mg Oral Daily FRANK Moreno   20 mg at 03/07/18 2117   • glucagon (human recombinant) (GLUCAGEN DIAGNOSTIC) injection 1 mg  1 mg Subcutaneous  PRN Soniya Thomas MD       • insulin aspart (novoLOG) injection 0-9 Units  0-9 Units Subcutaneous 4x Daily With Meals & Nightly Soniya Thomas MD       • LORazepam (ATIVAN) tablet 0.5 mg  0.5 mg Oral Q6H Gal Randall MD   0.5 mg at 03/08/18 0317    Followed by   • LORazepam (ATIVAN) tablet 0.5 mg  0.5 mg Oral Q8H Gal Randall MD       • metoprolol tartrate (LOPRESSOR) tablet 25 mg  25 mg Oral Q12H FRANK Moreno   25 mg at 03/07/18 2117   • multiple vitamin (M.V.I. Adult) 10 mL, thiamine (B-1) 100 mg, folic acid 1 mg, magnesium sulfate 2 g in sodium chloride 0.9 % 1,000 mL infusion  100 mL/hr Intravenous Q24H Gal Randall  mL/hr at 03/07/18 2117 100 mL/hr at 03/07/18 2117   • pantoprazole (PROTONIX) EC tablet 40 mg  40 mg Oral Daily FRANK Moreno   40 mg at 03/07/18 2118   • potassium chloride (MICRO-K) CR capsule 40 mEq  40 mEq Oral PRN Epifanio May MD   40 mEq at 03/08/18 0317   • sodium chloride 0.9 % flush 1-10 mL  1-10 mL Intravenous PRN FRANK Moreno           Allergies:  No Known Allergies    Social History:   Social History     Social History   • Marital status:      Social History Main Topics   • Smoking status: Former Smoker     Packs/day: 1.50     Quit date: 3/15/2007   • Alcohol use 3.6 oz/week     6 Cans of beer per week      Comment: 1 pint a day OR MORE OF VODKA   • Drug use: No   • Sexual activity: Defer        Family History:  Family History   Problem Relation Age of Onset   • Heart disease Mother    • Alcohol abuse Father    • Liver cancer Father    • Cancer Sister    • Hypertension Brother    • Alcohol abuse Brother    • Skin cancer Brother         Review of Systems: as per HPI, in addition:    General:       no weakness / fatigue,                       No fevers / chills                       no weight loss  HEENT:       no dysphagia / odynophagia  Neck:           normal range of motion, no swelling  Respiratory: no cough / congestion                      +  "shortness of air , orthopnea and dyspnea on exertion                      No wheezing  CV:              No chest pain                       No palpitations  Abdomen/GI: no nausea / vomiting                      No diarrhea / constipation                      No abdominal pain, + increased abdominal distention  :             no dysuria / urinary frequency                       No urgency, normal output  Endocrine:   no polyuria / polydipsia,                      No heat or cold intolerance  Skin:           no rashes or skin breakdown   Vascular:   + edema                     No claudication  Psych:        no depression/ anxiety  Neuro:        no focal weakness, no seizures  Musculoskeletal: no joint pain or deformities      Physical Exam:  Vitals:   Temp (24hrs), Av.6 °F (36.4 °C), Min:97.3 °F (36.3 °C), Max:98 °F (36.7 °C)    /88 (BP Location: Left arm, Patient Position: Sitting)  Pulse 68  Temp 97.3 °F (36.3 °C) (Oral)   Resp 16  Ht 180.3 cm (71\")  Wt 99.7 kg (219 lb 12.8 oz)  SpO2 98%  BMI 30.66 kg/m2  Intake/Output:     Intake/Output Summary (Last 24 hours) at 18 0826  Last data filed at 18 0649   Gross per 24 hour   Intake              240 ml   Output             1175 ml   Net             -935 ml        Wt Readings from Last 1 Encounters:   18 0446 99.7 kg (219 lb 12.8 oz)   18 1249 99.8 kg (220 lb)     Exam:    General Appearance:  Awake, alert, oriented x3, no acute distress  Well-appearing   Head and Face:  Normocephalic, atraumatic, mucus membranes moist, oropharynx clear   Eyes:  No icterus, pupils equal round and reactive to light, extraocular movements intact    ENMT: Moist mucosa, tongue symmetric    Neck: Supple  no jugular venous distention  no thyromegaly   Pulmonary:  Respiratory effort: Normal  Auscultation of lungs: Clear bilaterally  No wheezes  No rhonchi  Good air movement, good expansion   Chest wall:  No tenderness or deformity   Cardiovascular:  " Auscultation of the heart: Normal rhythm, no murmurs  1-2+ edema of extremities    Abdomen:  Abdomen: soft, non-tender, normal bowel sounds all four quadrants, no masses , Mildly distended  Liver and spleen: no hepatosplenomegaly   Musculoskeletal: Digits and nails: normal  Normal range of motion  No joint swelling or gross deformities    Skin: Skin inspection: color normal, no visible rashes or lesions  Skin palpation: texture, turgor normal, no palpable lesions   Lymphatic:  no cervical lymphadenopathy    Psychiatric: Judgement and insight: normal  Orientation to person place and time: normal  Mood and affect: normal       DATA:  Radiology and Labs:  The following labs independently reviewed by me  Old records independently reviewed showing Stage III chronic kidney disease, congestive heart failure, baseline creatinine as described above  The following radiologic studies independently viewed by me, findings chest x-ray with cardiomegaly, small left effusion, borderline vascular congestion  Interval notes, chart personally reviewed by me.   New problems include acute CHF exacerbation, worsening volume overload  Discussed with patient, chart reviewed  Platelet count 114, normal mental status, no fevers    Risk/ complexity of medical care/ medical decision making  High given the high-dose diuretics in the setting of congestive heart failure and chronic kidney disease, risk of worsening kidney function/renal failure with diuretics if not closely monitored          Labs:   Recent Results (from the past 24 hour(s))   BNP    Collection Time: 03/07/18  1:38 PM   Result Value Ref Range    proBNP 55176.0 (H) 5.0 - 900.0 pg/mL   Comprehensive Metabolic Panel    Collection Time: 03/07/18  1:38 PM   Result Value Ref Range    Glucose 90 65 - 99 mg/dL    BUN 25 (H) 8 - 23 mg/dL    Creatinine 1.35 (H) 0.76 - 1.27 mg/dL    Sodium 141 136 - 145 mmol/L    Potassium 2.8 (L) 3.5 - 5.2 mmol/L    Chloride 93 (L) 98 - 107 mmol/L    CO2  34.0 (H) 22.0 - 29.0 mmol/L    Calcium 8.7 8.6 - 10.5 mg/dL    Total Protein 6.8 6.0 - 8.5 g/dL    Albumin 3.80 3.50 - 5.20 g/dL    ALT (SGPT) 8 1 - 41 U/L    AST (SGOT) 19 1 - 40 U/L    Alkaline Phosphatase 98 39 - 117 U/L    Total Bilirubin 1.5 (H) 0.1 - 1.2 mg/dL    eGFR Non African Amer 52 (L) >60 mL/min/1.73    Globulin 3.0 gm/dL    A/G Ratio 1.3 g/dL    BUN/Creatinine Ratio 18.5 7.0 - 25.0    Anion Gap 14.0 mmol/L   CBC Auto Differential    Collection Time: 03/07/18  1:38 PM   Result Value Ref Range    WBC 3.90 (L) 4.50 - 10.70 10*3/mm3    RBC 3.58 (L) 4.60 - 6.00 10*6/mm3    Hemoglobin 11.2 (L) 13.7 - 17.6 g/dL    Hematocrit 34.4 (L) 40.4 - 52.2 %    MCV 96.1 79.8 - 96.2 fL    MCH 31.3 27.0 - 32.7 pg    MCHC 32.6 32.6 - 36.4 g/dL    RDW 13.8 11.5 - 14.5 %    RDW-SD 48.4 37.0 - 54.0 fl    MPV 10.8 6.0 - 12.0 fL    Platelets 114 (L) 140 - 500 10*3/mm3    Neutrophil % 67.2 42.7 - 76.0 %    Lymphocyte % 15.1 (L) 19.6 - 45.3 %    Monocyte % 17.4 (H) 5.0 - 12.0 %    Eosinophil % 0.3 0.3 - 6.2 %    Basophil % 0.0 0.0 - 1.5 %    Immature Grans % 0.0 0.0 - 0.5 %    Neutrophils, Absolute 2.62 1.90 - 8.10 10*3/mm3    Lymphocytes, Absolute 0.59 (L) 0.90 - 4.80 10*3/mm3    Monocytes, Absolute 0.68 0.20 - 1.20 10*3/mm3    Eosinophils, Absolute 0.01 0.00 - 0.70 10*3/mm3    Basophils, Absolute 0.00 0.00 - 0.20 10*3/mm3    Immature Grans, Absolute 0.00 0.00 - 0.03 10*3/mm3   Troponin    Collection Time: 03/07/18  1:38 PM   Result Value Ref Range    Troponin T 0.048 (H) 0.000 - 0.030 ng/mL   Magnesium    Collection Time: 03/07/18  1:38 PM   Result Value Ref Range    Magnesium 0.9 (C) 1.6 - 2.4 mg/dL   Protime-INR    Collection Time: 03/07/18  4:39 PM   Result Value Ref Range    Protime 16.0 (H) 11.7 - 14.2 Seconds    INR 1.31 (H) 0.90 - 1.10   aPTT    Collection Time: 03/07/18  4:39 PM   Result Value Ref Range    PTT 29.7 22.7 - 35.4 seconds   Troponin    Collection Time: 03/07/18  8:40 PM   Result Value Ref Range    Troponin  T 0.040 (H) 0.000 - 0.030 ng/mL   Magnesium    Collection Time: 03/07/18  8:40 PM   Result Value Ref Range    Magnesium 0.9 (C) 1.6 - 2.4 mg/dL   Troponin    Collection Time: 03/08/18 12:06 AM   Result Value Ref Range    Troponin T 0.038 (H) 0.000 - 0.030 ng/mL   Magnesium    Collection Time: 03/08/18  1:16 AM   Result Value Ref Range    Magnesium 1.5 (L) 1.6 - 2.4 mg/dL   Potassium    Collection Time: 03/08/18  1:16 AM   Result Value Ref Range    Potassium 3.4 (L) 3.5 - 5.2 mmol/L   Troponin    Collection Time: 03/08/18  6:01 AM   Result Value Ref Range    Troponin T 0.038 (H) 0.000 - 0.030 ng/mL   Comprehensive Metabolic Panel    Collection Time: 03/08/18  6:01 AM   Result Value Ref Range    Glucose 93 65 - 99 mg/dL    BUN 27 (H) 8 - 23 mg/dL    Creatinine 1.38 (H) 0.76 - 1.27 mg/dL    Sodium 141 136 - 145 mmol/L    Potassium 4.2 3.5 - 5.2 mmol/L    Chloride 96 (L) 98 - 107 mmol/L    CO2 32.5 (H) 22.0 - 29.0 mmol/L    Calcium 8.2 (L) 8.6 - 10.5 mg/dL    Total Protein 6.1 6.0 - 8.5 g/dL    Albumin 3.40 (L) 3.50 - 5.20 g/dL    ALT (SGPT) 8 1 - 41 U/L    AST (SGOT) 20 1 - 40 U/L    Alkaline Phosphatase 91 39 - 117 U/L    Total Bilirubin 1.4 (H) 0.1 - 1.2 mg/dL    eGFR Non African Amer 51 (L) >60 mL/min/1.73    Globulin 2.7 gm/dL    A/G Ratio 1.3 g/dL    BUN/Creatinine Ratio 19.6 7.0 - 25.0    Anion Gap 12.5 mmol/L   BNP    Collection Time: 03/08/18  6:01 AM   Result Value Ref Range    proBNP 97567.0 (H) 5.0 - 900.0 pg/mL   Lipid Panel    Collection Time: 03/08/18  6:01 AM   Result Value Ref Range    Total Cholesterol 117 0 - 200 mg/dL    Triglycerides 58 0 - 150 mg/dL    HDL Cholesterol 53 40 - 60 mg/dL    LDL Cholesterol  52 0 - 100 mg/dL    VLDL Cholesterol 11.6 5 - 40 mg/dL    LDL/HDL Ratio 0.99    Magnesium    Collection Time: 03/08/18  6:01 AM   Result Value Ref Range    Magnesium 1.7 1.6 - 2.4 mg/dL   Hemoglobin A1c    Collection Time: 03/08/18  6:01 AM   Result Value Ref Range    Hemoglobin A1C 4.80 4.80 -  5.60 %   POC Glucose Once    Collection Time: 03/08/18  7:51 AM   Result Value Ref Range    Glucose 80 70 - 130 mg/dL       Radiology:  Imaging Results (last 24 hours)     Procedure Component Value Units Date/Time    CT Abdomen Pelvis Without Contrast [[id number]] Collected:  03/07/18 1938     Updated:  03/08/18 0759    Narrative:       CT OF THE ABDOMEN AND PELVIS WITHOUT CONTRAST 3/7/2018      HISTORY: Abdominal swelling.     TECHNIQUE: Axial images were obtained from the lung bases to the  symphysis pubis. No intravenous contrast was given.     FINDINGS: Small left pleural effusion is seen.     Large amount of ascites is seen in the abdomen and pelvis.     The liver appears somewhat small and slightly irregular in contour  consistent with cirrhosis. Please correlate. Spleen does not appear  enlarged. The gallbladder, pancreas, adrenals and kidneys appear  unremarkable.     No bowel wall thickening or bowel dilatation is seen.       Impression:       1. Small left pleural effusion.  2. Large amount of ascites.  3. Liver appears somewhat small. Please correlate for cirrhosis.     Radiation dose reduction techniques were utilized, including automated  exposure control and exposure modulation based on body size.     This report was finalized on 3/8/2018 7:56 AM by Dr. Zachery Ngo MD.       XR Chest 2 View [[id number]] Collected:  03/07/18 1738     Updated:  03/08/18 0759    Narrative:       PA AND LATERAL CHEST 3/7/2018      HISTORY: Shortness of breath. HIstory of CHF.     FINDINGS: Heart size is at the upper limits of normal.     There is a small left pleural effusion. Tiny probable calcified  granuloma seen in the left upper lobe.     Lungs appear otherwise clear.     No pneumothorax is seen.       Impression:       Borderline cardiomegaly and small left pleural effusion.     This report was finalized on 3/8/2018 7:56 AM by Dr. Zachery Ngo MD.                  Assessment:   Problem List:   Stage III  chronic kidney disease, stable near, near baseline, monitor closely with diuretics  Acute exacerbation of diastolic congestive heart failure  Volume overload  Chronic hypertension  Alcoholism  Borderline elevated troponin, possibly due to chronic kidney disease  Hypokalemia on admission, improved  Metabolic alkalosis  Hypomagnesemia      Plan:   His renal function is stable and near recent baseline  Monitor renal function, volume, lites lites closely on diuretics  Agree with current diuretic regimen  Low magnesium and potassium on admission likely related to alcoholism, monitor closely  Will also check a phosphorus level tomorrow morning  Continue fluid restriction, low-salt diet, daily weights  Discussed alcohol avoidance, possible rehabilitation after discharge  Monitor closely for alcohol withdrawal      Continue to monitor electrolytes and volume closely   Avoid IV contrast and nephrotoxic medications     Thank you very much for the referral.    I appreciate the opportunity to participate in this patient's care.  Please call with any questions or concerns.         Joseph Trinidad M.D  [facility]  [phone]  3/8/2018        Dictation via Dragon dictation software

## 2018-03-08 NOTE — CONSULTS
Internal medicine consult    Referring physician  Dr. ARANDA    Primary care physician  Dr. Long    Reason for consult  Follow medical problems    History of present illness  68-year-old white male who is well-known to our service from multiple admissions in the past with history of chronic kidney disease stage III hypertension diastolic CHF prostate cancer coronary artery disease depression with atrial fibrillation on Eliquis presented to [facility] emergency room with lower extremity swelling and weight gain shortness of breath.  Patient evaluated in ER found to be in decompensated CHF cor pulmonale and anasarca admitted for management.  Patient also continued drink and needs detoxification.  Patient denies any chest pain fever chills cough abdominal pain vomiting diarrhea but he has been nauseated.    PAST MEDICAL HISTORY    • Alcoholism     • Arthritis     • Atrial fibrillation     • Cellulitis     • CHF (congestive heart failure)     • Colon polyps 08/21/2006   • History of coronary angioplasty     • History of MRSA infection 2005   • History of staph infection 08/02/2005   • Hypertension     • Infectious viral hepatitis     • Prostate cancer 04/28/2010   • Withdrawal symptoms, alcohol        PAST SURGICAL HISTORY   Surgical History          Past Surgical History:   Procedure Laterality Date   • APPENDECTOMY       • CARDIAC ABLATION Right 01/18/2013     Atrial Flutter Ablation, Dr. Otis Srinivasan   • CARDIAC CATHETERIZATION N/A 8/2/2016     Procedure: Left Heart Cath   ?right cath too;  Surgeon: Epifanio May MD;  [facility];  Service:    • CARDIAC CATHETERIZATION N/A 8/2/2016     Procedure: Coronary angiography;  Surgeon: Epifanio May MD;  Location:  [facility] CATH INVASIVE LOCATION;  Service:    • CARDIAC CATHETERIZATION N/A 8/2/2016     Procedure: Left ventriculography;  Surgeon: Epifanio May MD;  Location:  [facility] CATH INVASIVE LOCATION;  Service:    •  CARDIAC CATHETERIZATION N/A 8/2/2016     Procedure: Right Heart Cath;  Surgeon: Epifanio May MD;  Location:  [facility] CATH INVASIVE LOCATION;  Service:    • COLONOSCOPY N/A 3/15/2017     Procedure: COLONOSCOPY TO CECUM WITH COLD BIOPSY POLYECTOMY;  Surgeon: Anibal Bower MD;  [facility];  Service:    • COLONOSCOPY W/ BIOPSIES AND POLYPECTOMY N/A 08/21/2006     Colonoscopy w/ snare cautery, polypectomy & biopsy, PATH:  Sigmoid Colon Biopsy: focal vascular congestion & evidence of recent hemorrhage, non-specific.  Recto-Sigmoid Colon Biopsy: Fragments of tubular adenoam w/ mild dysplasia. Dr. Mildred You   • CORONARY ANGIOPLASTY WITH STENT PLACEMENT         x 2    • CYSTOSCOPY N/A 04/28/2010     Cystoscopy w/ transurethral incision of the bladder neck, Dr. ANDREW Cordova   • CYSTOTOMY N/A 09/23/2011     Laparoscopic closure of incidental cystotomy, Laparoscopic incision of pelvic lymphocele, Dr. Jaime Royal   • JOINT REPLACEMENT       • LEG DEBRIDEMENT Left 08/19/2005     Left Thigh, Debridement skin & subcutaneous tissue muscle w/ closure via advancement flaps, Dr. Mildred You   • LEG DEBRIDEMENT Left 08/08/2005     Debridement of left anterior thigh, Dr. Mildred You   • PROSTATECTOMY N/A 04/28/2010     Adenocarcinoma, Primary Yissel Grade 3, Secondar Yissel Grade 3. Combined yissel score 6. Tumor involves rt & lt lobes, negative capsular margin, no invasion of seminal vesicles, no identified perineural invastion, Dr. Jaime Royal   • SKIN BIOPSY       • TONSILLECTOMY       • TOTAL KNEE ARTHROPLASTY Left 03/25/2008     Left total knee arthroplasty w/ [place] pinless computer navigation, Dr. Ashok Crocker   • WOUND DEBRIDEMENT Left 08/02/2005     Left Buttocks & Left thigh wounds, Debridement of left buttocks & left thigh wounds, Dr. Mildred You         FAMILY HISTORY        Family History   Problem Relation Age of Onset   • Heart disease Mother     • Alcohol abuse  "Father     • Liver cancer Father     • Cancer Sister     • Hypertension Brother     • Alcohol abuse Brother     • Skin cancer Brother        SOCIAL HISTORY   Social History    Social History            Social History   • Marital status:        Spouse name: N/A   • Number of children: N/A   • Years of education: N/A          Occupational History   • Not on file.              Social History Main Topics   • Smoking status: Former Smoker       Packs/day: 1.50       Quit date: 3/15/2007   • Smokeless tobacco: Not on file   • Alcohol use 3.6 oz/week        6 Cans of beer per week          Comment: 1 pint a day OR MORE OF VODKA   • Drug use: No   • Sexual activity: Defer           Other Topics Concern   • Not on file      Social History Narrative         ALLERGIES  Review of patient's allergies indicates no known allergies.    Home medications reviewed     REVIEW OF SYSTEMS  Review of Systems   Constitutional: Negative for activity change, appetite change and fever.   HENT: Negative for congestion and sore throat.    Eyes: Negative.    Respiratory: Positive for shortness of breath (on exertion). Negative for cough.    Cardiovascular: Positive for leg swelling (BLE). Negative for chest pain.   Gastrointestinal: Positive for abdominal distention. Negative for abdominal pain, diarrhea and vomiting.   Endocrine: Negative.    Genitourinary: Negative for decreased urine volume and dysuria.   Musculoskeletal: Negative for neck pain.   Skin: Negative for rash and wound.   Allergic/Immunologic: Negative.    Neurological: Negative for weakness, numbness and headaches.   Hematological: Negative.    Psychiatric/Behavioral: Negative.    All other systems reviewed and are negative.     PHYSICAL EXAM  Blood pressure 121/88, pulse 68, temperature 97.3 °F (36.3 °C), temperature source Oral, resp. rate 16, height 180.3 cm (71\"), weight 99.7 kg (219 lb 12.8 oz), SpO2 98 %.    Constitutional: He is oriented to person, place, and time " and well-developed, well-nourished, and in no distress.   chronically ill appearring, older than stated age   Head: Normocephalic and atraumatic.   Eyes: EOM are normal. Pupils are equal, round, and reactive to light. No scleral icterus.   Neck: Normal range of motion. Neck supple.   Cardiovascular: Normal rate and normal heart sounds.  An irregularly irregular rhythm present.   Pulmonary/Chest: Effort normal and breath sounds normal. No respiratory distress.   Crackles in the bases of the lungs   Abdominal: Soft. There is no tenderness. There is no rebound and no guarding.   Ascites severe   Musculoskeletal: Normal range of motion. He exhibits edema (2+ in BLE).   Neurological: He is alert and oriented to person, place, and time. He has normal sensation and normal strength.   Skin: Skin is warm and dry.   Psychiatric: Mood and affect normal.     LAB RESULTS  Lab Results (last 24 hours)     Procedure Component Value Units Date/Time    Troponin [[id number]]  (Abnormal) Collected:  03/07/18 1338    Specimen:  Blood Updated:  03/07/18 1644     Troponin T 0.048 (H) ng/mL     Narrative:       Troponin T Reference Ranges:  Less than 0.03 ng/mL:    Negative for AMI  0.03 to 0.09 ng/mL:      Indeterminant for AMI  Greater than 0.09 ng/mL: Positive for AMI    Protime-INR [[id number]]  (Abnormal) Collected:  03/07/18 1639    Specimen:  Blood Updated:  03/07/18 1700     Protime 16.0 (H) Seconds      INR 1.31 (H)    aPTT [[id number]]  (Normal) Collected:  03/07/18 1639    Specimen:  Blood Updated:  03/07/18 1700     PTT 29.7 seconds     Magnesium [[id number]]  (Abnormal) Collected:  03/07/18 1338    Specimen:  Blood Updated:  03/07/18 1725     Magnesium 0.9 (C) mg/dL     Magnesium [[id number]]  (Abnormal) Collected:  03/07/18 2040    Specimen:  Blood Updated:  03/07/18 2113     Magnesium 0.9 (C) mg/dL     Troponin [[id number]]  (Abnormal) Collected:  03/07/18 2040    Specimen:  Blood Updated:  03/07/18 2138     Troponin T 0.040 (H) ng/mL      Narrative:       Troponin T Reference Ranges:  Less than 0.03 ng/mL:    Negative for AMI  0.03 to 0.09 ng/mL:      Indeterminant for AMI  Greater than 0.09 ng/mL: Positive for AMI    Troponin [[id number]]  (Abnormal) Collected:  03/08/18 0006    Specimen:  Blood Updated:  03/08/18 0044     Troponin T 0.038 (H) ng/mL     Narrative:       Troponin T Reference Ranges:  Less than 0.03 ng/mL:    Negative for AMI  0.03 to 0.09 ng/mL:      Indeterminant for AMI  Greater than 0.09 ng/mL: Positive for AMI    Potassium [[id number]]  (Abnormal) Collected:  03/08/18 0116    Specimen:  Blood Updated:  03/08/18 0142     Potassium 3.4 (L) mmol/L     Magnesium [[id number]]  (Abnormal) Collected:  03/08/18 0116    Specimen:  Blood Updated:  03/08/18 0146     Magnesium 1.5 (L) mg/dL     Hemoglobin A1c [[id number]]  (Normal) Collected:  03/08/18 0601    Specimen:  Blood Updated:  03/08/18 0626     Hemoglobin A1C 4.80 %     Narrative:       Hemoglobin A1C Ranges:    Increased Risk for Diabetes  5.7% to 6.4%  Diabetes                     >= 6.5%  Diabetic Goal                < 7.0%    Magnesium [[id number]]  (Normal) Collected:  03/08/18 0601    Specimen:  Blood Updated:  03/08/18 0640     Magnesium 1.7 mg/dL     Comprehensive Metabolic Panel [[id number]]  (Abnormal) Collected:  03/08/18 0601    Specimen:  Blood Updated:  03/08/18 0640     Glucose 93 mg/dL      BUN 27 (H) mg/dL      Creatinine 1.38 (H) mg/dL      Sodium 141 mmol/L      Potassium 4.2 mmol/L      Chloride 96 (L) mmol/L      CO2 32.5 (H) mmol/L      Calcium 8.2 (L) mg/dL      Total Protein 6.1 g/dL      Albumin 3.40 (L) g/dL      ALT (SGPT) 8 U/L      AST (SGOT) 20 U/L      Alkaline Phosphatase 91 U/L      Total Bilirubin 1.4 (H) mg/dL      eGFR Non African Amer 51 (L) mL/min/1.73      Globulin 2.7 gm/dL      A/G Ratio 1.3 g/dL      BUN/Creatinine Ratio 19.6     Anion Gap 12.5 mmol/L     Lipid Panel [[id number]] Collected:  03/08/18 0601    Specimen:  Blood Updated:  03/08/18  0640     Total Cholesterol 117 mg/dL      Triglycerides 58 mg/dL      HDL Cholesterol 53 mg/dL      LDL Cholesterol  52 mg/dL      VLDL Cholesterol 11.6 mg/dL      LDL/HDL Ratio 0.99    Narrative:       Cholesterol Reference Ranges  (U.S. Department of Health and Human Services ATP III Classifications)    Desirable          <200 mg/dL  Borderline High    200-239 mg/dL  High Risk          >240 mg/dL      Triglyceride Reference Ranges  (U.S. Department of Health and Human Services ATP III Classifications)    Normal           <150 mg/dL  Borderline High  150-199 mg/dL  High             200-499 mg/dL  Very High        >500 mg/dL    HDL Reference Ranges  (U.S. Department of Health and Human Services ATP III Classifcations)    Low     <40 mg/dl (major risk factor for CHD)  High    >60 mg/dl ('negative' risk factor for CHD)        LDL Reference Ranges  (U.S. Department of Health and Human Services ATP III Classifcations)    Optimal          <100 mg/dL  Near Optimal     100-129 mg/dL  Borderline High  130-159 mg/dL  High             160-189 mg/dL  Very High        >189 mg/dL    BNP [[id number]]  (Abnormal) Collected:  03/08/18 0601    Specimen:  Blood Updated:  03/08/18 0643     proBNP 12506.0 (H) pg/mL     Narrative:       Among patients with dyspnea, NT-proBNP is highly sensitive for the detection of acute congestive heart failure. In addition NT-proBNP of <300 pg/ml effectively rules out acute congestive heart failure with 99% negative predictive value.    Troponin [[id number]]  (Abnormal) Collected:  03/08/18 0601    Specimen:  Blood Updated:  03/08/18 0648     Troponin T 0.038 (H) ng/mL     Narrative:       Troponin T Reference Ranges:  Less than 0.03 ng/mL:    Negative for AMI  0.03 to 0.09 ng/mL:      Indeterminant for AMI  Greater than 0.09 ng/mL: Positive for AMI    POC Glucose Once [[id number]]  (Normal) Collected:  03/08/18 0751    Specimen:  Blood Updated:  03/08/18 0752     Glucose 80 mg/dL     Narrative:        Meter: [id number] : [id number] Beltran Irma NA        Imaging Results (last 24 hours)     Procedure Component Value Units Date/Time    CT Abdomen Pelvis Without Contrast [[id number]] Collected:  03/07/18 1938     Updated:  03/08/18 0759    Narrative:       CT OF THE ABDOMEN AND PELVIS WITHOUT CONTRAST 3/7/2018      HISTORY: Abdominal swelling.     TECHNIQUE: Axial images were obtained from the lung bases to the  symphysis pubis. No intravenous contrast was given.     FINDINGS: Small left pleural effusion is seen.     Large amount of ascites is seen in the abdomen and pelvis.     The liver appears somewhat small and slightly irregular in contour  consistent with cirrhosis. Please correlate. Spleen does not appear  enlarged. The gallbladder, pancreas, adrenals and kidneys appear  unremarkable.     No bowel wall thickening or bowel dilatation is seen.       Impression:       1. Small left pleural effusion.  2. Large amount of ascites.  3. Liver appears somewhat small. Please correlate for cirrhosis.     Radiation dose reduction techniques were utilized, including automated  exposure control and exposure modulation based on body size.     This report was finalized on 3/8/2018 7:56 AM by Dr. Zachery Ngo MD.       XR Chest 2 View [[id number]] Collected:  03/07/18 1738     Updated:  03/08/18 0759    Narrative:       PA AND LATERAL CHEST 3/7/2018      HISTORY: Shortness of breath. HIstory of CHF.     FINDINGS: Heart size is at the upper limits of normal.     There is a small left pleural effusion. Tiny probable calcified  granuloma seen in the left upper lobe.     Lungs appear otherwise clear.     No pneumothorax is seen.       Impression:       Borderline cardiomegaly and small left pleural effusion.     This report was finalized on 3/8/2018 7:56 AM by Dr. Zachery Ngo MD.           EKG:  Rate: 95  afib with IVCD, frequent PVCs,   diffuse nonspecific ST and atT wave changes, difference from. 08 2016 when  he was in a narrow complex, otherwise similar..      Current Facility-Administered Medications:   •  acetaminophen (TYLENOL) tablet 650 mg, 650 mg, Oral, Q4H PRN, FRANK Moreno  •  [START ON 3/9/2018] allopurinol (ZYLOPRIM) tablet 100 mg, 100 mg, Oral, Daily, Gal Randall MD  •  [START ON 3/9/2018] apixaban (ELIQUIS) tablet 2.5 mg, 2.5 mg, Oral, Q12H, Gal Randall MD  •  bumetanide (BUMEX) injection 1 mg, 1 mg, Intravenous, Q12H, Joseph Trinidad MD  •  colchicine tablet 1.2 mg, 1.2 mg, Oral, BID, FRANK Moreno, 1.2 mg at 03/08/18 0856  •  dextrose (D50W) solution 25 g, 25 g, Intravenous, Q15 Min PRN, Soniya Thomas MD  •  dextrose (GLUTOSE) oral gel 15 g, 15 g, Oral, Q15 Min PRN, Soniya Thomas MD  •  FLUoxetine (PROzac) capsule 20 mg, 20 mg, Oral, Daily, FRANK Moreno, 20 mg at 03/08/18 0856  •  glucagon (human recombinant) (GLUCAGEN DIAGNOSTIC) injection 1 mg, 1 mg, Subcutaneous, PRN, Soniya Thomas MD  •  insulin aspart (novoLOG) injection 0-9 Units, 0-9 Units, Subcutaneous, 4x Daily With Meals & Nightly, Soniya Thomas MD  •  [COMPLETED] LORazepam (ATIVAN) tablet 0.5 mg, 0.5 mg, Oral, Q6H, 0.5 mg at 03/08/18 1515 **FOLLOWED BY** LORazepam (ATIVAN) tablet 0.5 mg, 0.5 mg, Oral, Q8H, Gal Randall MD  •  metoprolol tartrate (LOPRESSOR) tablet 25 mg, 25 mg, Oral, Q12H, FRANK Moreno, 25 mg at 03/08/18 0856  •  neomycin-bacitracin-polymyxin (NEOSPORIN) ointment, , Topical, Daily, Gal Randall MD  •  pantoprazole (PROTONIX) EC tablet 40 mg, 40 mg, Oral, Daily, FRANK Moreno, 40 mg at 03/08/18 0856  •  potassium chloride (MICRO-K) CR capsule 40 mEq, 40 mEq, Oral, PRN, Epifanio May MD, 40 mEq at 03/08/18 0317  •  sodium chloride 0.9 % flush 1-10 mL, 1-10 mL, Intravenous, PRN, FRANK Moreno     ASSESSMENT  Decompensated CHF  Anasarca  Volume overload  Cirrhosis with ascites  Hypertension  Alcohol abuse  Elevated troponin with known coronary artery disease  Prostate  cancer  Depression  Paroxysmal atrial fibrillation on her liquids    PLAN  Agree with current care  Diuresis with strict is and os  Repeat 2-D echo  Continue home medication and adjust the doses  Detox with alcohol withdrawal precautions  Supportive care  Stress ulcer prophylaxis  Cardiology and gastroenterology and nephrology following patient  We'll follow with Dr. ARANDA further recommendation according to hospital course    VICTOR MANUEL VALDIVIA MD

## 2018-03-08 NOTE — PROGRESS NOTES
Discharge Planning Assessment  [facility]     Patient Name: Anibal Freedman  MRN: [id number]  Today's Date: 3/8/2018    Admit Date: 3/7/2018          Discharge Needs Assessment       03/08/18 1431    Living Environment    Stair Railings at Home inside, present at both sides    Transportation Available car    Living Environment    Quality Of Family Relationships supportive    Able to Return to Prior Living Arrangements yes    Discharge Needs Assessment    Concerns To Be Addressed discharge planning concerns    Readmission Within The Last 30 Days no previous admission in last 30 days      03/08/18 1421    Living Environment    Lives With spouse    Living Arrangements house            Discharge Plan       03/08/18 1458    Case Management/Social Work Plan    Additional Comments Spoke with patient at bedside, face sheet verified. Patient lives in a Bi Level house with his wife Patient denies and Home Health or Rehab.  Spoke with patient regarding Alcohol Abuse Rehab patient stated he would to go Rehab. CCP consulted [facility] to see. Patient states he is independent with ADL's denies the need for Home Health at this time. Estelita Fuchs RN      03/08/18 1433    Case Management/Social Work Plan    Plan Home        Discharge Placement     No information found                Demographic Summary       03/08/18 1406    Referral Information    Arrived From home or self-care    Primary Care Physician Information    Name Moustapha Long MD      03/08/18 1401    Referral Information    Admission Type inpatient            Functional Status       03/08/18 1407    Functional Status Current    Ambulation 2-->assistive person    Transferring 2-->assistive person    Toileting 2-->assistive person    Bathing 2-->assistive person    Dressing 2-->assistive person    Eating 0-->independent    Communication 0-->understands/communicates without difficulty    Swallowing (if score 2 or more for any item, consult Rehab Services)  0-->swallows foods/liquids without difficulty    Functional Status Prior    Ambulation 0-->independent    Transferring 0-->independent    Toileting 0-->independent    Bathing 0-->independent    Dressing 0-->independent    Eating 0-->independent    Communication 0-->understands/communicates without difficulty    Swallowing 0-->swallows foods/liquids without difficulty    IADL    Medications independent    Meal Preparation independent    Housekeeping independent    Laundry independent    Shopping independent    Oral Care independent            Psychosocial     None            Abuse/Neglect     None            Legal     None            Substance Abuse     None            Patient Forms     None          Estelita Fuchs, RN

## 2018-03-08 NOTE — PLAN OF CARE
Problem: Patient Care Overview (Adult)  Goal: Plan of Care Review  Outcome: Ongoing (interventions implemented as appropriate)   03/08/18 0431   Coping/Psychosocial Response Interventions   Plan Of Care Reviewed With patient   Patient Care Overview   Progress no change   Outcome Evaluation   Outcome Summary/Follow up Plan VSS. Pt on Bumex gtt and receiving banana bag. 2 runs Mg given so far, recheck 1.5. K+ replaced x2, 2nd recheck pending. Ativan continued q6h. Pt remains in AF, bradycardic rate with trigeminy PVC's. No c/o pain during this shift. CIWA score remains zero. Will continue to monitor closely.

## 2018-03-08 NOTE — CONSULTS
[facility] Assessment:  Pt is a 67 yo white  male.  He was admitted for CHF issues complicated by his alcoholism.  Pt is A&Ox4.  Pt was pleasant and cooperative with interview.  No SI/HI.  No psychosis.  No other drug abuse.  Pt reports he has been drinking since he was in college.  Pt currently drinks 1 pint to a fifth of vodka a day, 6 or 7 days a week.  Pt's wife of 47 years also drinks daily.  Pt has a special needs son who is 43 years old and is in a community living situation.     Pt states he takes Prozac for depression but feels like the etoh is his biggest issue.  He knows he needs to stop and it is killing him.  He has been at the [facility] two times for etoh rehab.  The last being about two years ago.  He states the longest he has gone without a drink is 72 days.      Pt indicates he is willing to go to a rehab facility.  Will give pt a list of facilities he can pursue upon discharge.

## 2018-03-09 ENCOUNTER — APPOINTMENT (OUTPATIENT)
Dept: NUCLEAR MEDICINE | Facility: HOSPITAL | Age: 68
End: 2018-03-09

## 2018-03-09 ENCOUNTER — APPOINTMENT (OUTPATIENT)
Dept: ULTRASOUND IMAGING | Facility: HOSPITAL | Age: 68
End: 2018-03-09
Attending: INTERNAL MEDICINE

## 2018-03-09 LAB
ALBUMIN FLD-MCNC: 2.2 G/DL
ALBUMIN SERPL-MCNC: 3.2 G/DL (ref 3.5–5.2)
ALBUMIN/GLOB SERPL: 1.2 G/DL
ALP SERPL-CCNC: 99 U/L (ref 39–117)
ALT SERPL W P-5'-P-CCNC: 14 U/L (ref 1–41)
ANION GAP SERPL CALCULATED.3IONS-SCNC: 18 MMOL/L
APPEARANCE FLD: ABNORMAL
AST SERPL-CCNC: 31 U/L (ref 1–40)
BASOPHILS # BLD AUTO: 0.02 10*3/MM3 (ref 0–0.2)
BASOPHILS NFR BLD AUTO: 0.4 % (ref 0–1.5)
BH CV ECHO MEAS - ACS: 2.1 CM
BH CV ECHO MEAS - AI DEC SLOPE: 168 CM/SEC^2
BH CV ECHO MEAS - AI MAX PG: 72.6 MMHG
BH CV ECHO MEAS - AI MAX VEL: 426 CM/SEC
BH CV ECHO MEAS - AI P1/2T: 742.7 MSEC
BH CV ECHO MEAS - AO MAX PG (FULL): 2.8 MMHG
BH CV ECHO MEAS - AO MAX PG: 7.3 MMHG
BH CV ECHO MEAS - AO MEAN PG (FULL): 1 MMHG
BH CV ECHO MEAS - AO MEAN PG: 3 MMHG
BH CV ECHO MEAS - AO ROOT AREA (BSA CORRECTED): 1.5
BH CV ECHO MEAS - AO ROOT AREA: 8 CM^2
BH CV ECHO MEAS - AO ROOT DIAM: 3.2 CM
BH CV ECHO MEAS - AO V2 MAX: 135 CM/SEC
BH CV ECHO MEAS - AO V2 MEAN: 78.1 CM/SEC
BH CV ECHO MEAS - AO V2 VTI: 23.3 CM
BH CV ECHO MEAS - ASC AORTA: 2.9 CM
BH CV ECHO MEAS - AVA(I,A): 2.6 CM^2
BH CV ECHO MEAS - AVA(I,D): 2.6 CM^2
BH CV ECHO MEAS - AVA(V,A): 2.5 CM^2
BH CV ECHO MEAS - AVA(V,D): 2.5 CM^2
BH CV ECHO MEAS - BSA(HAYCOCK): 2.3 M^2
BH CV ECHO MEAS - BSA: 2.2 M^2
BH CV ECHO MEAS - BZI_BMI: 30.5 KILOGRAMS/M^2
BH CV ECHO MEAS - BZI_METRIC_HEIGHT: 180.3 CM
BH CV ECHO MEAS - BZI_METRIC_WEIGHT: 99.3 KG
BH CV ECHO MEAS - CONTRAST EF (2CH): 29.8 ML/M^2
BH CV ECHO MEAS - CONTRAST EF 4CH: 22.5 ML/M^2
BH CV ECHO MEAS - EDV(CUBED): 74.1 ML
BH CV ECHO MEAS - EDV(MOD-SP2): 131 ML
BH CV ECHO MEAS - EDV(MOD-SP4): 129 ML
BH CV ECHO MEAS - EDV(TEICH): 78.6 ML
BH CV ECHO MEAS - EF(CUBED): 31.6 %
BH CV ECHO MEAS - EF(MOD-SP2): 29.8 %
BH CV ECHO MEAS - EF(MOD-SP4): 22.5 %
BH CV ECHO MEAS - EF(TEICH): 26 %
BH CV ECHO MEAS - ESV(CUBED): 50.7 ML
BH CV ECHO MEAS - ESV(MOD-SP2): 92 ML
BH CV ECHO MEAS - ESV(MOD-SP4): 100 ML
BH CV ECHO MEAS - ESV(TEICH): 58.1 ML
BH CV ECHO MEAS - FS: 11.9 %
BH CV ECHO MEAS - IVS/LVPW: 1
BH CV ECHO MEAS - IVSD: 1.4 CM
BH CV ECHO MEAS - LAT PEAK E' VEL: 6 CM/SEC
BH CV ECHO MEAS - LV DIASTOLIC VOL/BSA (35-75): 58.9 ML/M^2
BH CV ECHO MEAS - LV MASS(C)D: 224.3 GRAMS
BH CV ECHO MEAS - LV MASS(C)DI: 102.4 GRAMS/M^2
BH CV ECHO MEAS - LV MAX PG: 4.5 MMHG
BH CV ECHO MEAS - LV MEAN PG: 2 MMHG
BH CV ECHO MEAS - LV SYSTOLIC VOL/BSA (12-30): 45.6 ML/M^2
BH CV ECHO MEAS - LV V1 MAX: 106 CM/SEC
BH CV ECHO MEAS - LV V1 MEAN: 61.8 CM/SEC
BH CV ECHO MEAS - LV V1 VTI: 19.5 CM
BH CV ECHO MEAS - LVIDD: 4.2 CM
BH CV ECHO MEAS - LVIDS: 3.7 CM
BH CV ECHO MEAS - LVLD AP2: 7.7 CM
BH CV ECHO MEAS - LVLD AP4: 8.2 CM
BH CV ECHO MEAS - LVLS AP2: 7.5 CM
BH CV ECHO MEAS - LVLS AP4: 7.7 CM
BH CV ECHO MEAS - LVOT AREA (M): 3.1 CM^2
BH CV ECHO MEAS - LVOT AREA: 3.1 CM^2
BH CV ECHO MEAS - LVOT DIAM: 2 CM
BH CV ECHO MEAS - LVPWD: 1.4 CM
BH CV ECHO MEAS - MED PEAK E' VEL: 3 CM/SEC
BH CV ECHO MEAS - MV DEC SLOPE: 378 CM/SEC^2
BH CV ECHO MEAS - MV DEC TIME: 0.17 SEC
BH CV ECHO MEAS - MV E MAX VEL: 81.6 CM/SEC
BH CV ECHO MEAS - MV MAX PG: 3.6 MMHG
BH CV ECHO MEAS - MV MEAN PG: 1 MMHG
BH CV ECHO MEAS - MV P1/2T MAX VEL: 94.5 CM/SEC
BH CV ECHO MEAS - MV P1/2T: 73.2 MSEC
BH CV ECHO MEAS - MV V2 MAX: 94.8 CM/SEC
BH CV ECHO MEAS - MV V2 MEAN: 42.8 CM/SEC
BH CV ECHO MEAS - MV V2 VTI: 23.9 CM
BH CV ECHO MEAS - MVA P1/2T LCG: 2.3 CM^2
BH CV ECHO MEAS - MVA(P1/2T): 3 CM^2
BH CV ECHO MEAS - MVA(VTI): 2.6 CM^2
BH CV ECHO MEAS - PA ACC TIME: 0.12 SEC
BH CV ECHO MEAS - PA MAX PG (FULL): 0.13 MMHG
BH CV ECHO MEAS - PA MAX PG: 0.93 MMHG
BH CV ECHO MEAS - PA PR(ACCEL): 25 MMHG
BH CV ECHO MEAS - PA V2 MAX: 48.3 CM/SEC
BH CV ECHO MEAS - PVA(V,A): 5.7 CM^2
BH CV ECHO MEAS - PVA(V,D): 5.7 CM^2
BH CV ECHO MEAS - QP/QS: 0.9
BH CV ECHO MEAS - RV MAX PG: 0.81 MMHG
BH CV ECHO MEAS - RV MEAN PG: 0 MMHG
BH CV ECHO MEAS - RV V1 MAX: 44.9 CM/SEC
BH CV ECHO MEAS - RV V1 MEAN: 24.7 CM/SEC
BH CV ECHO MEAS - RV V1 VTI: 8.9 CM
BH CV ECHO MEAS - RVOT AREA: 6.2 CM^2
BH CV ECHO MEAS - RVOT DIAM: 2.8 CM
BH CV ECHO MEAS - SI(AO): 85.5 ML/M^2
BH CV ECHO MEAS - SI(CUBED): 10.7 ML/M^2
BH CV ECHO MEAS - SI(LVOT): 27.9 ML/M^2
BH CV ECHO MEAS - SI(MOD-SP2): 17.8 ML/M^2
BH CV ECHO MEAS - SI(MOD-SP4): 13.2 ML/M^2
BH CV ECHO MEAS - SI(TEICH): 9.3 ML/M^2
BH CV ECHO MEAS - SV(AO): 187.4 ML
BH CV ECHO MEAS - SV(CUBED): 23.4 ML
BH CV ECHO MEAS - SV(LVOT): 61.3 ML
BH CV ECHO MEAS - SV(MOD-SP2): 39 ML
BH CV ECHO MEAS - SV(MOD-SP4): 29 ML
BH CV ECHO MEAS - SV(RVOT): 55 ML
BH CV ECHO MEAS - SV(TEICH): 20.5 ML
BH CV ECHO MEAS - TAPSE (>1.6): 1.1 CM2
BH CV ECHO MEAS - TR MAX VEL: 263 CM/SEC
BH CV VAS BP RIGHT ARM: NORMAL MMHG
BH CV XLRA - RV BASE: 3.6 CM
BH CV XLRA - TDI S': 4 CM/SEC
BILIRUB SERPL-MCNC: 1.3 MG/DL (ref 0.1–1.2)
BUN BLD-MCNC: 32 MG/DL (ref 8–23)
BUN/CREAT SERPL: 18 (ref 7–25)
CALCIUM SPEC-SCNC: 8.2 MG/DL (ref 8.6–10.5)
CHLORIDE SERPL-SCNC: 92 MMOL/L (ref 98–107)
CHOLEST SERPL-MCNC: 103 MG/DL (ref 0–200)
CO2 SERPL-SCNC: 29 MMOL/L (ref 22–29)
COLOR FLD: YELLOW
CREAT BLD-MCNC: 1.78 MG/DL (ref 0.76–1.27)
DEPRECATED RDW RBC AUTO: 50.7 FL (ref 37–54)
E/E' RATIO: 18
EOSINOPHIL # BLD AUTO: 0.01 10*3/MM3 (ref 0–0.7)
EOSINOPHIL NFR BLD AUTO: 0.2 % (ref 0.3–6.2)
ERYTHROCYTE [DISTWIDTH] IN BLOOD BY AUTOMATED COUNT: 14.4 % (ref 11.5–14.5)
GFR SERPL CREATININE-BSD FRML MDRD: 38 ML/MIN/1.73
GLOBULIN UR ELPH-MCNC: 2.7 GM/DL
GLUCOSE BLD-MCNC: 100 MG/DL (ref 65–99)
GLUCOSE BLDC GLUCOMTR-MCNC: 105 MG/DL (ref 70–130)
GLUCOSE BLDC GLUCOMTR-MCNC: 114 MG/DL (ref 70–130)
GLUCOSE BLDC GLUCOMTR-MCNC: 118 MG/DL (ref 70–130)
GLUCOSE BLDC GLUCOMTR-MCNC: 90 MG/DL (ref 70–130)
HCT VFR BLD AUTO: 34.4 % (ref 40.4–52.2)
HDLC SERPL-MCNC: 51 MG/DL (ref 40–60)
HGB BLD-MCNC: 11 G/DL (ref 13.7–17.6)
IMM GRANULOCYTES # BLD: 0 10*3/MM3 (ref 0–0.03)
IMM GRANULOCYTES NFR BLD: 0 % (ref 0–0.5)
INR PPP: 1.86 (ref 0.9–1.1)
LDLC SERPL CALC-MCNC: 39 MG/DL (ref 0–100)
LDLC/HDLC SERPL: 0.77 {RATIO}
LEFT ATRIUM VOLUME INDEX: 40 ML/M2
LYMPHOCYTES # BLD AUTO: 0.96 10*3/MM3 (ref 0.9–4.8)
LYMPHOCYTES NFR BLD AUTO: 20.1 % (ref 19.6–45.3)
LYMPHOCYTES NFR FLD MANUAL: 29 %
MAGNESIUM SERPL-MCNC: 1.4 MG/DL (ref 1.6–2.4)
MAXIMAL PREDICTED HEART RATE: 151 BPM
MCH RBC QN AUTO: 31.3 PG (ref 27–32.7)
MCHC RBC AUTO-ENTMCNC: 32 G/DL (ref 32.6–36.4)
MCV RBC AUTO: 98 FL (ref 79.8–96.2)
MONOCYTES # BLD AUTO: 0.83 10*3/MM3 (ref 0.2–1.2)
MONOCYTES NFR BLD AUTO: 17.4 % (ref 5–12)
MONOCYTES NFR FLD: 50 %
MONOS+MACROS NFR FLD: 16 %
NEUTROPHILS # BLD AUTO: 2.95 10*3/MM3 (ref 1.9–8.1)
NEUTROPHILS NFR BLD AUTO: 61.9 % (ref 42.7–76)
NEUTROPHILS NFR FLD MANUAL: 5 %
NRBC BLD MANUAL-RTO: 0 /100 WBC (ref 0–0)
NT-PROBNP SERPL-MCNC: ABNORMAL PG/ML (ref 0–900)
PLATELET # BLD AUTO: 131 10*3/MM3 (ref 140–500)
PMV BLD AUTO: 12.4 FL (ref 6–12)
POTASSIUM BLD-SCNC: 3.7 MMOL/L (ref 3.5–5.2)
PROT FLD-MCNC: 3.1 G/DL
PROT SERPL-MCNC: 5.9 G/DL (ref 6–8.5)
PROTHROMBIN TIME: 21.1 SECONDS (ref 11.7–14.2)
RBC # BLD AUTO: 3.51 10*6/MM3 (ref 4.6–6)
RBC # FLD AUTO: 286 /MM3
SODIUM BLD-SCNC: 139 MMOL/L (ref 136–145)
STRESS TARGET HR: 128 BPM
TRIGL SERPL-MCNC: 64 MG/DL (ref 0–150)
TSH SERPL DL<=0.05 MIU/L-ACNC: 4.96 MIU/ML (ref 0.27–4.2)
URATE SERPL-MCNC: 8.7 MG/DL (ref 3.4–7)
VLDLC SERPL-MCNC: 12.8 MG/DL (ref 5–40)
WBC # FLD: 126 /MM3
WBC NRBC COR # BLD: 4.77 10*3/MM3 (ref 4.5–10.7)

## 2018-03-09 PROCEDURE — 99232 SBSQ HOSP IP/OBS MODERATE 35: CPT | Performed by: INTERNAL MEDICINE

## 2018-03-09 PROCEDURE — 80053 COMPREHEN METABOLIC PANEL: CPT | Performed by: HOSPITALIST

## 2018-03-09 PROCEDURE — 97535 SELF CARE MNGMENT TRAINING: CPT | Performed by: OCCUPATIONAL THERAPIST

## 2018-03-09 PROCEDURE — 84550 ASSAY OF BLOOD/URIC ACID: CPT | Performed by: HOSPITALIST

## 2018-03-09 PROCEDURE — 87015 SPECIMEN INFECT AGNT CONCNTJ: CPT | Performed by: INTERNAL MEDICINE

## 2018-03-09 PROCEDURE — 94799 UNLISTED PULMONARY SVC/PX: CPT

## 2018-03-09 PROCEDURE — 82962 GLUCOSE BLOOD TEST: CPT

## 2018-03-09 PROCEDURE — 97162 PT EVAL MOD COMPLEX 30 MIN: CPT | Performed by: PHYSICAL THERAPIST

## 2018-03-09 PROCEDURE — 82042 OTHER SOURCE ALBUMIN QUAN EA: CPT | Performed by: INTERNAL MEDICINE

## 2018-03-09 PROCEDURE — 84157 ASSAY OF PROTEIN OTHER: CPT | Performed by: INTERNAL MEDICINE

## 2018-03-09 PROCEDURE — 87205 SMEAR GRAM STAIN: CPT | Performed by: INTERNAL MEDICINE

## 2018-03-09 PROCEDURE — 84443 ASSAY THYROID STIM HORMONE: CPT | Performed by: HOSPITALIST

## 2018-03-09 PROCEDURE — 85610 PROTHROMBIN TIME: CPT | Performed by: INTERNAL MEDICINE

## 2018-03-09 PROCEDURE — 82105 ALPHA-FETOPROTEIN SERUM: CPT | Performed by: INTERNAL MEDICINE

## 2018-03-09 PROCEDURE — 89051 BODY FLUID CELL COUNT: CPT | Performed by: INTERNAL MEDICINE

## 2018-03-09 PROCEDURE — P9046 ALBUMIN (HUMAN), 25%, 20 ML: HCPCS

## 2018-03-09 PROCEDURE — 25010000002 ALBUMIN HUMAN 25% PER 50 ML

## 2018-03-09 PROCEDURE — 87070 CULTURE OTHR SPECIMN AEROBIC: CPT | Performed by: INTERNAL MEDICINE

## 2018-03-09 PROCEDURE — 97166 OT EVAL MOD COMPLEX 45 MIN: CPT | Performed by: OCCUPATIONAL THERAPIST

## 2018-03-09 PROCEDURE — 85025 COMPLETE CBC W/AUTO DIFF WBC: CPT | Performed by: HOSPITALIST

## 2018-03-09 PROCEDURE — 25010000002 MAGNESIUM SULFATE IN D5W 1G/100ML (PREMIX) 1-5 GM/100ML-% SOLUTION: Performed by: NURSE PRACTITIONER

## 2018-03-09 PROCEDURE — 80061 LIPID PANEL: CPT | Performed by: HOSPITALIST

## 2018-03-09 PROCEDURE — 76942 ECHO GUIDE FOR BIOPSY: CPT

## 2018-03-09 PROCEDURE — 83735 ASSAY OF MAGNESIUM: CPT | Performed by: NURSE PRACTITIONER

## 2018-03-09 PROCEDURE — 83880 ASSAY OF NATRIURETIC PEPTIDE: CPT | Performed by: HOSPITALIST

## 2018-03-09 RX ORDER — ALBUMIN (HUMAN) 12.5 G/50ML
SOLUTION INTRAVENOUS
Status: COMPLETED
Start: 2018-03-09 | End: 2018-03-09

## 2018-03-09 RX ORDER — LIDOCAINE HYDROCHLORIDE 10 MG/ML
20 INJECTION, SOLUTION INFILTRATION; PERINEURAL ONCE
Status: COMPLETED | OUTPATIENT
Start: 2018-03-09 | End: 2018-03-09

## 2018-03-09 RX ORDER — MAGNESIUM SULFATE 1 G/100ML
2 INJECTION INTRAVENOUS ONCE
Status: COMPLETED | OUTPATIENT
Start: 2018-03-09 | End: 2018-03-09

## 2018-03-09 RX ORDER — ALBUMIN (HUMAN) 12.5 G/50ML
50 SOLUTION INTRAVENOUS ONCE
Status: COMPLETED | OUTPATIENT
Start: 2018-03-09 | End: 2018-03-09

## 2018-03-09 RX ADMIN — ALBUMIN HUMAN 50 G: 0.25 SOLUTION INTRAVENOUS at 17:19

## 2018-03-09 RX ADMIN — FOLIC ACID 1 MG: 1 TABLET ORAL at 08:31

## 2018-03-09 RX ADMIN — LIDOCAINE HYDROCHLORIDE 20 ML: 10 INJECTION, SOLUTION INFILTRATION; PERINEURAL at 14:05

## 2018-03-09 RX ADMIN — Medication 1 TABLET: at 12:02

## 2018-03-09 RX ADMIN — BACITRACIN ZINC NEOMYCIN SULFATE POLYMYXIN B SULFATE 1 APPLICATION: 400; 3.5; 5 OINTMENT TOPICAL at 08:32

## 2018-03-09 RX ADMIN — APIXABAN 2.5 MG: 2.5 TABLET, FILM COATED ORAL at 20:39

## 2018-03-09 RX ADMIN — IPRATROPIUM BROMIDE AND ALBUTEROL SULFATE 3 ML: .5; 3 SOLUTION RESPIRATORY (INHALATION) at 23:38

## 2018-03-09 RX ADMIN — MAGNESIUM SULFATE HEPTAHYDRATE 2 G: 1 INJECTION, SOLUTION INTRAVENOUS at 09:06

## 2018-03-09 RX ADMIN — ALLOPURINOL 100 MG: 100 TABLET ORAL at 08:31

## 2018-03-09 RX ADMIN — LORAZEPAM 0.5 MG: 0.5 TABLET ORAL at 04:32

## 2018-03-09 RX ADMIN — IPRATROPIUM BROMIDE AND ALBUTEROL SULFATE 3 ML: .5; 3 SOLUTION RESPIRATORY (INHALATION) at 07:21

## 2018-03-09 RX ADMIN — METOPROLOL TARTRATE 25 MG: 25 TABLET ORAL at 20:39

## 2018-03-09 RX ADMIN — BUMETANIDE 1 MG: 0.25 INJECTION INTRAMUSCULAR; INTRAVENOUS at 09:11

## 2018-03-09 RX ADMIN — LORAZEPAM 0.5 MG: 0.5 TABLET ORAL at 12:02

## 2018-03-09 RX ADMIN — Medication 100 MG: at 08:31

## 2018-03-09 RX ADMIN — METOPROLOL TARTRATE 25 MG: 25 TABLET ORAL at 08:32

## 2018-03-09 RX ADMIN — IPRATROPIUM BROMIDE AND ALBUTEROL SULFATE 3 ML: .5; 3 SOLUTION RESPIRATORY (INHALATION) at 19:18

## 2018-03-09 RX ADMIN — ALBUMIN (HUMAN) 50 G: 12.5 SOLUTION INTRAVENOUS at 17:19

## 2018-03-09 RX ADMIN — PANTOPRAZOLE SODIUM 40 MG: 40 TABLET, DELAYED RELEASE ORAL at 08:31

## 2018-03-09 RX ADMIN — IPRATROPIUM BROMIDE AND ALBUTEROL SULFATE 3 ML: .5; 3 SOLUTION RESPIRATORY (INHALATION) at 11:18

## 2018-03-09 RX ADMIN — SODIUM CHLORIDE 250 ML: 900 INJECTION, SOLUTION INTRAVENOUS at 17:13

## 2018-03-09 RX ADMIN — FLUOXETINE HYDROCHLORIDE 20 MG: 20 CAPSULE ORAL at 08:31

## 2018-03-09 NOTE — PROGRESS NOTES
Continued Stay Note  [facility]     Patient Name: Anibal Freedman  MRN: [id number]  Today's Date: 3/9/2018    Admit Date: 3/7/2018          Discharge Plan       03/09/18 1401    Case Management/Social Work Plan    Plan Home    Additional Comments CCP was notified by FRANK Hopson this morning requesting the process for Pt being able to go to [facility] upon d/c.  CCP notified [facility] [phone] and they will follow up with Zakiya Valverde.  CCP following.  SAMARA LUCAS/CCP              Discharge Codes     None            Chelsy Khalil RN

## 2018-03-09 NOTE — PLAN OF CARE
Problem: Patient Care Overview (Adult)  Goal: Plan of Care Review   03/09/18 1212   Coping/Psychosocial Response Interventions   Plan Of Care Reviewed With patient   Outcome Evaluation   Outcome Summary/Follow up Plan Pt presents with some generalized weakness and slight balance deficits, able to walk to bathroom but requires min A for unsteadiness. Does require some A with LBD after toileting. May benefit from OT to address ADLs.       Problem: Inpatient Occupational Therapy  Goal: Strength Goal LTG- OT   03/09/18 1212   Strength OT LTG   Strength Goal OT LTG, Date Established 03/09/18   Strength Goal OT LTG, Time to Achieve 1 wk   Strength Goal OT LTG, Measure to Achieve Pt to increase BUE strength to 4-/5 to assist in ADLs at d/c     Goal: ADL Goal LTG- OT   03/09/18 1212   ADL OT LTG   ADL OT LTG, Date Established 03/09/18   ADL OT LTG, Time to Achieve 1 wk   ADL OT LTG, Activity Type ADL skills   ADL OT LTG, [place] Level standby assist   ADL OT LTG, Additional Goal AD if necessary

## 2018-03-09 NOTE — PROGRESS NOTES
"   LOS: 2 days   Patient Care Team:  Moustapha Long MD as PCP - General  Moustapha Long MD as PCP - Family Medicine    Chief Complaint/ Reason for encounter: Acute renal failure/chronic kidney disease  Chief Complaint   Patient presents with   • Edema         Subjective     History of Present Illness    Subjective:  Symptoms:  Stable.  No shortness of breath or chest pain.  (No new complaints  He feels better, shortness of breath improved  Lower extremity edema improved).    Diet:  Adequate intake.  No nausea.    Activity level: Returning to normal.    Pain:  He reports no pain.          History taken from: Patient and chart    Objective     Vital Signs  Temp:  [97.4 °F (36.3 °C)-97.8 °F (36.6 °C)] 97.4 °F (36.3 °C)  Heart Rate:  [56-70] 58  Resp:  [14-18] 16  BP: ()/(66-89) 106/83       Wt Readings from Last 1 Encounters:   03/09/18 0348 99.8 kg (220 lb)   03/08/18 0446 99.7 kg (219 lb 12.8 oz)   03/07/18 1249 99.8 kg (220 lb)       Objective:  General Appearance:  Comfortable, well-appearing, in no acute distress and not in pain.    Vital signs: (most recent): Blood pressure 106/83, pulse 58, temperature 97.4 °F (36.3 °C), temperature source Oral, resp. rate 16, height 180.3 cm (71\"), weight 99.8 kg (220 lb), SpO2 98 %.  Vital signs are normal.  No fever.    Output: Producing urine.    HEENT: Normal HEENT exam.    Lungs:  Normal respiratory rate and normal effort.  He is not in respiratory distress.  Breath sounds clear to auscultation.  No wheezes.    Heart: Normal rate.  Regular rhythm.  S1 normal.  No murmur.   Abdomen: Abdomen is soft and non-distended.  Bowel sounds are normal.   There is no epigastric area or no suprapubic area tenderness.  There is no rebound tenderness.   There is no mass.   Extremities: Normal range of motion.  There is dependent edema.  There is no deformity.    Pulses: Distal pulses are intact.    Neurological: Patient is alert and oriented to person, place and time.    Skin:  " Warm and dry.  No rash or cyanosis.             Results Review:    Past Medical History: reviewed and updated  Past Medical History:   Diagnosis Date   • Alcoholism    • Arthritis    • Atrial fibrillation     pt reported   • Cellulitis    • CHF (congestive heart failure)    • Colon polyps 08/21/2006    Colonoscopy w/ snare cautery, polypectomy & biopsy, PATH:  Sigmoid Colon Biopsy: focal vascular congestion & evidence of recent hemorrhage, non-specific.  Recto-Sigmoid Colon Biopsy: Fragments of tubular adenoam w/ mild dysplasia. Dr. Mildred You   • DM2 (diabetes mellitus, type 2) 3/7/2018   • History of coronary angioplasty    • History of MRSA infection 2005    pt reported   • History of staph infection 08/02/2005   • Hypertension    • Infectious viral hepatitis     pt reported   • Prostate cancer 04/28/2010    S/P Radical Prostectomy, Adenocarcinoma, Primary Yissel Grade 3, Secondar Yissel Grade 3. Combined yissel score 6. Tumor involves rt & lt lobes, negative capsular margin, no invasion of seminal vesicles, no identified perineural invastion   • Withdrawal symptoms, alcohol          Allergies:  No Known Allergies    Intake/Output:     Intake/Output Summary (Last 24 hours) at 03/09/18 1423  Last data filed at 03/09/18 0612   Gross per 24 hour   Intake              450 ml   Output              250 ml   Net              200 ml         DATA:  Radiology and Labs:  The following labs independently reviewed by me.  Interval notes, chart personally reviewed by me.   Old records independently reviewed showing stage III chronic kidney disease, baseline creatinine around 1.3  The following radiologic studies independently viewed by me, findings 2-D echocardiogram showing EF 30%  New problems include   Renal failure, volume depletion      Risk/ complexity of medical care/ medical decision making high given new acute renal failure, risk for the deterioration of status if not treated emergently                Labs:    Recent Results (from the past 24 hour(s))   POC Glucose Once    Collection Time: 03/08/18  5:33 PM   Result Value Ref Range    Glucose 105 70 - 130 mg/dL   Urinalysis With / Microscopic If Indicated -    Collection Time: 03/08/18  5:51 PM   Result Value Ref Range    Color, UA Yellow Yellow, Straw    Appearance, UA Clear Clear    pH, UA 5.5 5.0 - 8.0    Specific Gravity, UA 1.010 1.005 - 1.030    Glucose, UA Negative Negative    Ketones, UA Negative Negative    Bilirubin, UA Negative Negative    Blood, UA Negative Negative    Protein, UA Negative Negative    Leuk Esterase, UA Negative Negative    Nitrite, UA Negative Negative    Urobilinogen, UA 1.0 E.U./dL 0.2 - 1.0 E.U./dL   POC Glucose Once    Collection Time: 03/08/18  8:25 PM   Result Value Ref Range    Glucose 124 70 - 130 mg/dL   Potassium    Collection Time: 03/08/18  8:34 PM   Result Value Ref Range    Potassium 4.0 3.5 - 5.2 mmol/L   Comprehensive Metabolic Panel    Collection Time: 03/09/18  4:12 AM   Result Value Ref Range    Glucose 100 (H) 65 - 99 mg/dL    BUN 32 (H) 8 - 23 mg/dL    Creatinine 1.78 (H) 0.76 - 1.27 mg/dL    Sodium 139 136 - 145 mmol/L    Potassium 3.7 3.5 - 5.2 mmol/L    Chloride 92 (L) 98 - 107 mmol/L    CO2 29.0 22.0 - 29.0 mmol/L    Calcium 8.2 (L) 8.6 - 10.5 mg/dL    Total Protein 5.9 (L) 6.0 - 8.5 g/dL    Albumin 3.20 (L) 3.50 - 5.20 g/dL    ALT (SGPT) 14 1 - 41 U/L    AST (SGOT) 31 1 - 40 U/L    Alkaline Phosphatase 99 39 - 117 U/L    Total Bilirubin 1.3 (H) 0.1 - 1.2 mg/dL    eGFR Non African Amer 38 (L) >60 mL/min/1.73    Globulin 2.7 gm/dL    A/G Ratio 1.2 g/dL    BUN/Creatinine Ratio 18.0 7.0 - 25.0    Anion Gap 18.0 mmol/L   BNP    Collection Time: 03/09/18  4:12 AM   Result Value Ref Range    proBNP 15328.0 (H) 0.0 - 900.0 pg/mL   TSH    Collection Time: 03/09/18  4:12 AM   Result Value Ref Range    TSH 4.960 (H) 0.270 - 4.200 mIU/mL   Lipid Panel    Collection Time: 03/09/18  4:12 AM   Result Value Ref Range    Total  Cholesterol 103 0 - 200 mg/dL    Triglycerides 64 0 - 150 mg/dL    HDL Cholesterol 51 40 - 60 mg/dL    LDL Cholesterol  39 0 - 100 mg/dL    VLDL Cholesterol 12.8 5 - 40 mg/dL    LDL/HDL Ratio 0.77    Uric Acid    Collection Time: 03/09/18  4:12 AM   Result Value Ref Range    Uric Acid 8.7 (H) 3.4 - 7.0 mg/dL   CBC Auto Differential    Collection Time: 03/09/18  4:12 AM   Result Value Ref Range    WBC 4.77 4.50 - 10.70 10*3/mm3    RBC 3.51 (L) 4.60 - 6.00 10*6/mm3    Hemoglobin 11.0 (L) 13.7 - 17.6 g/dL    Hematocrit 34.4 (L) 40.4 - 52.2 %    MCV 98.0 (H) 79.8 - 96.2 fL    MCH 31.3 27.0 - 32.7 pg    MCHC 32.0 (L) 32.6 - 36.4 g/dL    RDW 14.4 11.5 - 14.5 %    RDW-SD 50.7 37.0 - 54.0 fl    MPV 12.4 (H) 6.0 - 12.0 fL    Platelets 131 (L) 140 - 500 10*3/mm3    Neutrophil % 61.9 42.7 - 76.0 %    Lymphocyte % 20.1 19.6 - 45.3 %    Monocyte % 17.4 (H) 5.0 - 12.0 %    Eosinophil % 0.2 (L) 0.3 - 6.2 %    Basophil % 0.4 0.0 - 1.5 %    Immature Grans % 0.0 0.0 - 0.5 %    Neutrophils, Absolute 2.95 1.90 - 8.10 10*3/mm3    Lymphocytes, Absolute 0.96 0.90 - 4.80 10*3/mm3    Monocytes, Absolute 0.83 0.20 - 1.20 10*3/mm3    Eosinophils, Absolute 0.01 0.00 - 0.70 10*3/mm3    Basophils, Absolute 0.02 0.00 - 0.20 10*3/mm3    Immature Grans, Absolute 0.00 0.00 - 0.03 10*3/mm3    nRBC 0.0 0.0 - 0.0 /100 WBC   Magnesium    Collection Time: 03/09/18  4:12 AM   Result Value Ref Range    Magnesium 1.4 (C) 1.6 - 2.4 mg/dL   Protime-INR    Collection Time: 03/09/18  7:03 AM   Result Value Ref Range    Protime 21.1 (H) 11.7 - 14.2 Seconds    INR 1.86 (H) 0.90 - 1.10   POC Glucose Once    Collection Time: 03/09/18  7:59 AM   Result Value Ref Range    Glucose 90 70 - 130 mg/dL   POC Glucose Once    Collection Time: 03/09/18 12:18 PM   Result Value Ref Range    Glucose 118 70 - 130 mg/dL       Radiology:  Imaging Results (last 24 hours)     ** No results found for the last 24 hours. **             Medications have been reviewed:  Current  Facility-Administered Medications   Medication Dose Route Frequency Provider Last Rate Last Dose   • acetaminophen (TYLENOL) tablet 650 mg  650 mg Oral Q4H PRN FRANK Moreno       • allopurinol (ZYLOPRIM) tablet 100 mg  100 mg Oral Daily Gal Randall MD   100 mg at 03/09/18 0831   • apixaban (ELIQUIS) tablet 2.5 mg  2.5 mg Oral Q12H Gal Randall MD       • dextrose (D50W) solution 25 g  25 g Intravenous Q15 Min PRN Jawed MD William       • dextrose (GLUTOSE) oral gel 15 g  15 g Oral Q15 Min PRN Jawed MD William       • FLUoxetine (PROzac) capsule 20 mg  20 mg Oral Daily FRANK Moreno   20 mg at 03/09/18 0831   • folic acid (FOLVITE) tablet 1 mg  1 mg Oral Daily Gal Randall MD   1 mg at 03/09/18 0831   • glucagon (human recombinant) (GLUCAGEN DIAGNOSTIC) injection 1 mg  1 mg Subcutaneous PRN Jawonel Thomas MD       • insulin aspart (novoLOG) injection 0-9 Units  0-9 Units Subcutaneous 4x Daily With Meals & Nightly Jawed MD William       • ipratropium-albuterol (DUO-NEB) nebulizer solution 3 mL  3 mL Nebulization Q4H - RT Gal Randall MD   3 mL at 03/09/18 1118   • metoprolol tartrate (LOPRESSOR) tablet 25 mg  25 mg Oral Q12H FRANK Moreno   25 mg at 03/09/18 0832   • multivitamin (THERAGRAN) tablet 1 tablet  1 tablet Oral Daily Gal Randall MD   1 tablet at 03/09/18 1202   • neomycin-bacitracin-polymyxin (NEOSPORIN) ointment 1 application  1 application Topical Daily Gal Randall MD   1 application at 03/09/18 0832   • pantoprazole (PROTONIX) EC tablet 40 mg  40 mg Oral Daily FRANK Moreno   40 mg at 03/09/18 0831   • potassium chloride (MICRO-K) CR capsule 40 mEq  40 mEq Oral PRN Epifanio May MD   40 mEq at 03/08/18 0317   • sodium chloride 0.9 % flush 1-10 mL  1-10 mL Intravenous PRN FRANK Moreno       • thiamine (VITAMIN B-1) tablet 100 mg  100 mg Oral Daily Gal Randall MD   100 mg at 03/09/18 0831       Assessment/Plan     Principal Problem:    CHF exacerbation  Active Problems:     Right heart failure    Ascites of liver    Hypertension    Atrial fibrillation    Alcoholism    History of coronary angioplasty    DM2 (diabetes mellitus, type 2)    Pancytopenia    Chronic liver disease    Elevated troponin    CKD (chronic kidney disease)    Hypomagnesemia    Hypokalemia      Assessment:  (Assessment:   New acute renal failure, likely a bit over diuresed  Stage III chronic kidney disease, baseline creatinine 1.3  Acute exacerbation of diastolic congestive heart failure, improved with diuresis  Volume overload  Chronic hypertension  Alcoholism  Borderline elevated troponin, possibly due to chronic kidney disease  Hypokalemia on admission, improved  Metabolic alkalosis  Hypomagnesemia        Plan:   Renal function worse today  Likely a bit intravascularly volume deplete  Will plan to hold diuretics today  Restart diuretics by oral route tomorrow if renal function is improved  Replace Electrolytes as needed  Albumin after paracentesis today  discontinue fluid restriction  Continue low-salt diet, daily weights  Discussed alcohol avoidance, possible rehabilitation after discharge  Monitor closely for alcohol withdrawal        Continue to monitor electrolytes and volume closely   Avoid IV contrast and nephrotoxic medications ).             Continue to monitor renal function, electroytes and volume closely   Please call me with any questions or concerns      Joseph Trinidad MD   [facility]   [phone]    03/09/18  2:23 PM      Dictation performed using Dragon dictation software

## 2018-03-09 NOTE — PLAN OF CARE
Problem: Patient Care Overview (Adult)  Goal: Plan of Care Review  Outcome: Ongoing (interventions implemented as appropriate)   03/09/18 0250   Coping/Psychosocial Response Interventions   Plan Of Care Reviewed With patient   Patient Care Overview   Progress no change   Outcome Evaluation   Outcome Summary/Follow up Plan Pt complains of increased evening fatigue. Ativan continued as scheduled. SBP 90s-100s. No complaints of pain. CIWA 1, anxiety. Will continue to monitor.      Goal: Adult Individualization and Mutuality  Outcome: Ongoing (interventions implemented as appropriate)    Goal: Discharge Needs Assessment  Outcome: Ongoing (interventions implemented as appropriate)   03/09/18 0250   Discharge Needs Assessment   Discharge Disposition still a patient       Problem: Cardiac: Heart Failure (Adult)  Goal: Signs and Symptoms of Listed Potential Problems Will be Absent or Manageable (Cardiac: Heart Failure)  Outcome: Ongoing (interventions implemented as appropriate)   03/09/18 0250   Cardiac: Heart Failure   Problems Assessed (Heart Failure) all   Problems Present (Heart Failure) fluid/electrolyte imbalance;decreased quality of life;functional decline/self-care deficit;situational response       Problem: Acute Alcohol Withdrawal Syndrome, Risk For/Actual (Adult)  Goal: Signs and Symptoms of Listed Potential Problems Will be Absent or Manageable (Acute Alcohol Withdrawal Syndrome, Risk For/Actual)  Outcome: Ongoing (interventions implemented as appropriate)   03/09/18 0250   Acute Alcohol Withdrawal Syndrome, Risk For/Actual   Problems Assessed (Alcohol Withdrawal Syndrome) all   Problems Present (Alcohol Withdrawal Syndrome) none       Problem: Skin Integrity Impairment, Risk/Actual (Adult)  Goal: Identify Related Risk Factors and Signs and Symptoms  Outcome: Ongoing (interventions implemented as appropriate)   03/09/18 0250   Skin Integrity Impairment, Risk/Actual   Skin Integrity Impairment, Risk/Actual:  Related Risk Factors age extremes;edema;fluid/nutrition status;infection/disease process;sensory impairment;tissue perfusion impaired   Signs and Symptoms (Skin Integrity Impairment) edema     Goal: Skin Integrity/Wound Healing  Outcome: Ongoing (interventions implemented as appropriate)   03/09/18 0250   Skin Integrity Impairment, Risk/Actual (Adult)   Skin Integrity/Wound Healing making progress toward outcome       Problem: Fall Risk (Adult)  Goal: Identify Related Risk Factors and Signs and Symptoms  Outcome: Ongoing (interventions implemented as appropriate)   03/09/18 0250   Fall Risk   Fall Risk: Related Risk Factors culprit medication(s);fatigue/slow reaction;gait/mobility problems;history of falls;environment unfamiliar   Fall Risk: Signs and Symptoms presence of risk factors     Goal: Absence of Falls  Outcome: Ongoing (interventions implemented as appropriate)   03/09/18 0250   Fall Risk (Adult)   Absence of Falls making progress toward outcome

## 2018-03-09 NOTE — THERAPY EVALUATION
Acute Care - Physical Therapy Initial Evaluation  [facility]     Patient Name: Anibal Freedman  : 1950  MRN: [id number]  Today's Date: 3/9/2018   Onset of Illness/Injury or Date of Surgery Date: 18  Date of Referral to PT: 18         Admit Date: 3/7/2018     Visit Dx:    ICD-10-CM ICD-9-CM   1. New onset ascites of liver R18.8 789.59   2. Acute on chronic congestive heart failure, unspecified congestive heart failure type I50.9 428.0   3. Elevated troponin R74.8 790.6   4. Coagulopathy on Eliquis D68.9 286.9   5. Atrial fibrillation, unspecified type I48.91 427.31   6. Gait abnormality R26.9 781.2     Patient Active Problem List   Diagnosis   • CHF exacerbation   • Right heart failure   • Ascites of liver   • Hypertension   • Atrial fibrillation   • Alcoholism   • History of coronary angioplasty   • DM2 (diabetes mellitus, type 2)   • Pancytopenia   • Chronic liver disease   • Elevated troponin   • CKD (chronic kidney disease)   • Hypomagnesemia   • Hypokalemia     Past Medical History:   Diagnosis Date   • Alcoholism    • Arthritis    • Atrial fibrillation     pt reported   • Cellulitis    • CHF (congestive heart failure)    • Colon polyps 2006    Colonoscopy w/ snare cautery, polypectomy & biopsy, PATH:  Sigmoid Colon Biopsy: focal vascular congestion & evidence of recent hemorrhage, non-specific.  Recto-Sigmoid Colon Biopsy: Fragments of tubular adenoam w/ mild dysplasia. Dr. Mildred You   • DM2 (diabetes mellitus, type 2) 3/7/2018   • History of coronary angioplasty    • History of MRSA infection     pt reported   • History of staph infection 2005   • Hypertension    • Infectious viral hepatitis     pt reported   • Prostate cancer 2010    S/P Radical Prostectomy, Adenocarcinoma, Primary [place] Grade 3, Secondar [place] Grade 3. Combined yissel score 6. Tumor involves rt & lt lobes, negative capsular margin, no invasion of seminal vesicles, no identified perineural  invastion   • Withdrawal symptoms, alcohol      Past Surgical History:   Procedure Laterality Date   • APPENDECTOMY     • CARDIAC ABLATION Right 01/18/2013    Atrial Flutter Ablation, Dr. Otis Srinivasan   • CARDIAC CATHETERIZATION N/A 8/2/2016    Procedure: Left Heart Cath   ?right cath too;  Surgeon: Epifanio May MD;  Location: [facility] CATH INVASIVE LOCATION;  Service:    • CARDIAC CATHETERIZATION N/A 8/2/2016    Procedure: Coronary angiography;  Surgeon: Epifanio May MD;  Location: [facility] CATH INVASIVE LOCATION;  Service:    • CARDIAC CATHETERIZATION N/A 8/2/2016    Procedure: Left ventriculography;  Surgeon: Epifanio May MD;  Location: [facility] CATH INVASIVE LOCATION;  Service:    • CARDIAC CATHETERIZATION N/A 8/2/2016    Procedure: Right Heart Cath;  Surgeon: Epifanio May MD;  Location: [facility] CATH INVASIVE LOCATION;  Service:    • COLONOSCOPY N/A 3/15/2017    Procedure: COLONOSCOPY TO CECUM WITH COLD BIOPSY POLYECTOMY;  Surgeon: Anibal Bower MD;  Location: [facility] ENDOSCOPY;  Service:    • COLONOSCOPY W/ BIOPSIES AND POLYPECTOMY N/A 08/21/2006    Colonoscopy w/ snare cautery, polypectomy & biopsy, PATH:  Sigmoid Colon Biopsy: focal vascular congestion & evidence of recent hemorrhage, non-specific.  Recto-Sigmoid Colon Biopsy: Fragments of tubular adenoam w/ mild dysplasia. Dr. Mildred You   • CORONARY ANGIOPLASTY WITH STENT PLACEMENT      x 2    • CYSTOSCOPY N/A 04/28/2010    Cystoscopy w/ transurethral incision of the bladder neck, Dr. ANRDEW Cordova   • CYSTOTOMY N/A 09/23/2011    Laparoscopic closure of incidental cystotomy, Laparoscopic incision of pelvic lymphocele, Dr. Jaime Royal   • JOINT REPLACEMENT     • LEG DEBRIDEMENT Left 08/19/2005    Left Thigh, Debridement skin & subcutaneous tissue muscle w/ closure via advancement flaps, Dr. Mildred You   • LEG DEBRIDEMENT Left 08/08/2005    Debridement of left anterior thigh, Dr. Mildred You   • PROSTATECTOMY  N/A 04/28/2010    Adenocarcinoma, Primary [place] Grade 3, Secondar [place] Grade 3. Combined yissel score 6. Tumor involves rt & lt lobes, negative capsular margin, no invasion of seminal vesicles, no identified perineural invastion, Dr. Jaime Royal   • SKIN BIOPSY     • TONSILLECTOMY     • TOTAL KNEE ARTHROPLASTY Left 03/25/2008    Left total knee arthroplasty w/ Juan pinless computer navigation, Dr. Ashok Crocker   • WOUND DEBRIDEMENT Left 08/02/2005    Left Buttocks & Left thigh wounds, Debridement of left buttocks & left thigh wounds, Dr. Mildred You          PT ASSESSMENT (last 72 hours)      PT Evaluation       03/09/18 1154 03/09/18 1100    Rehab Evaluation    Document Type evaluation  -SO evaluation  -JK    Subjective Information agree to therapy  -SO agree to therapy  -JK    Patient Effort, Rehab Treatment good  -SO good  -JK    Symptoms Noted During/After Treatment fatigue  -SO fatigue  -JK    General Information    Patient Profile Review yes  -SO yes  -JK    Onset of Illness/Injury or Date of Surgery Date  03/08/18  -JK    General Observations Pt supine in bed, nsg present  -SO WM supine in bed with IV running  -JK    Pertinent History Of Current Problem CHF, ETOH abuse  -SO 67 yo admit thru ER with ETOH abuse, chronic heart failure, prostate CA, CHF, chronic kidney disease  -JK    Precautions/Limitations fall precautions  -SO     Prior Level of Function independent:;ADL's  -SO independent:;all household mobility;bed mobility;transfer;ADL's;gait  -JK    Equipment Currently Used at Home none  -SO none   He has a RW at home but does not use it  -JK    Plans/Goals Discussed With  patient  -JK    Risks Reviewed  patient:  -JK    Living Environment    Lives With spouse  -SO spouse  -JK    Living Arrangements house  -SO house  -JK    Home Accessibility  bed and bath on same level;stairs to enter home  -JK    Number of Stairs to Enter Home  5  -JK    Number of Stairs Within Home  12   pt lives in split  level home  -JK    Clinical Impression    Date of Referral to PT  03/09/18  -JK    Rehab Potential  good, to achieve stated therapy goals  -JK    Predicted Duration of Therapy Intervention (days/wks)  5-7 days  -JK    Vital Signs    Pretreatment Heart Rate (beats/min)  69  -JK    Intratreatment Heart Rate (beats/min)  81  -JK    Posttreatment Heart Rate (beats/min)  73  -JK    Vision Assessment/Intervention    Visual Impairment  WFL with corrective lenses  -JK    Cognitive Assessment/Intervention    Current Cognitive/Communication Assessment functional  -SO functional  -JK    Orientation Status oriented to;person;place;situation  -SO oriented to;person;place;situation  -JK    Follows Commands/Answers Questions 100% of the time  -% of the time;able to follow single-step instructions  -JK    Personal Safety decreased insight to deficits  -SO mild impairment  -JK    Personal Safety Interventions gait belt;fall prevention program maintained  -SO     ROM (Range of Motion)    General ROM no range of motion deficits identified  -SO     General ROM Detail  LE grossly WFL   limited by size of abdomen and edema in LEs  -JK    MMT (Manual Muscle Testing)    General MMT Assessment Detail Generalized weakness BUE 3+/5  -SO B hip flex 3+/5; B knee and ankle  grossly 4/5  -JK    Bed Mobility, Assessment/Treatment    Bed Mobility, Assistive Device  bed rails;head of bed elevated  -JK    Bed Mob, Supine to Sit, [place] contact guard assist  -SO contact guard assist  -JK    Bed Mobility, Impairments  ROM decreased;strength decreased;impaired balance  -JK    Transfer Assessment/Treatment    Transfers, Sit-Stand [place] contact guard assist;minimum assist (75% patient effort)  -SO contact guard assist;1 person + 1 person to manage equipment;verbal cues required  -JK    Transfers, Stand-Sit [place] contact guard assist;verbal cues required  -SO contact guard assist;verbal cues required;1 person + 1 person to manage  equipment  -JK    Transfers, Sit-Stand-Sit, Assist Device --   HHA  -SO other (see comments)   tsf to stand without AD initially  -JK    Toilet Transfer, [place] contact guard assist;verbal cues required  -SO     Toilet Transfer, Assistive Device --   Grab bar  -SO     Transfer, Safety Issues  weight-shifting ability decreased;step length decreased  -JK    Transfer, Impairments  ROM decreased;impaired balance;strength decreased  -JK    Gait Assessment/Treatment    Gait, [place] Level  minimum assist (75% patient effort)  -JK    Gait, Assistive Device  rolling walker  -JK    Gait, Distance (Feet)  60  -JK    Gait, Safety Issues  balance decreased during turns;weight-shifting ability decreased;step length decreased  -JK    Gait, Impairments  strength decreased;impaired balance  -JK    Gait, Comment  No AD initially with min assist needed due to pt reaching for walls and being unsteady; pt given RW and short rest break and continued to ambulate in room to recliner with RW and CGA  -JK    Therapy Exercises    Bilateral Lower Extremities  AROM:;10 reps;ankle pumps/circles;LAQ  -JK    Positioning and Restraints    Pre-Treatment Position in bed  -SO in bed  -JK    Post Treatment Position bed  -SO chair  -JK    In Bed sitting;sitting EOB;call light within reach   With MD  -SO     In Chair  reclined;call light within reach;notified nsg  -JK      03/08/18 1700 03/08/18 1431    Living Environment    Lives With spouse  -DD     Living Arrangements house  -DD     Stair Railings at Home  inside, present at both sides  -PT    Transportation Available  car  -PT      03/08/18 1421 03/07/18 1838    Living Environment    Lives With spouse  -PT spouse  -EK    Living Arrangements house  -PT house  -EK    Home Accessibility  bed and bath on same level;stairs within home  -EK    Number of Stairs Within Home  5  -EK    Stair Railings at Home  inside, present at both sides  -EK    Type of Financial/Environmental Concern  none  -EK     Transportation Available  car;family or friend will provide  -EK    Living Environment Comment  na  -EK      03/07/18 1826       General Information    Equipment Currently Used at Home none  -EK       User Key  (r) = Recorded By, (t) = Taken By, (c) = Cosigned By    Initials Name Provider Type    SO Jackie Brian, OTR Occupational Therapist    RADHA Gomez, PT Physical Therapist    PT Estelita Fuchs, RN Case Manager    LORI Barrera, [facility] Therapist    EK Makenna Felipe, SAMARA Registered Nurse          Physical Therapy Education     Title: PT OT SLP Therapies (Active)     Topic: Physical Therapy (Active)     Point: Mobility training (Done)    Learning Progress Summary    Learner Readiness Method Response Comment Documented by Status   Patient Acceptance E VU,NR  JK 03/09/18 1220 Done                      User Key     Initials Effective Dates Name Provider Type Discipline     12/01/15 -  China Gomez, PT Physical Therapist PT                PT Recommendation and Plan  Anticipated Equipment Needs At Discharge:  (resume use of RW at home)  Anticipated Discharge Disposition: home, home with assist  PT Frequency: daily  Plan of Care Review  Plan Of Care Reviewed With: patient  Outcome Summary/Follow up Plan: Pt presents to PT with multiple medical problems (ETOH abuse, heart failure, kidney disease, prostate CA) which are impacting pt's mobility. Pt reports he lives in a bilevel home with his wife, and must ambulate up/down flight of steps safely at home. He presented with impaired transfers and gait, impaired B LE strength, impaired tolerance to functional tasks, impaired balance. Pt would benefit from skilled PT to address these deficits to improve quality of life upon return to home and to lessen pt's risk of falls at home. Pt was not safe to ambulate without an assist device today due to unsteady gait and poor tolerance to functional tasks.          IP PT Goals       03/09/18 0792           Bed Mobility PT LTG    Bed Mobility PT LTG, Date Established 03/09/18  -JK      Bed Mobility PT LTG, Time to Achieve 5 - 7 days  -JK      Bed Mobility PT LTG, Activity Type all bed mobility  -JK      Bed Mobility PT LTG, [place] Level conditional independence  -JK      Transfer Training PT LTG    Transfer Training PT LTG, Date Established 03/09/18  -JK      Transfer Training PT LTG, Time to Achieve 5 - 7 days  -JK      Transfer Training PT LTG, Activity Type sit to stand/stand to sit  -JK      Transfer Training PT LTG, [place] Level conditional independence  -JK      Transfer Training PT LTG, Assist Device walker, rolling  -JK      Gait Training PT LTG    Gait Training Goal PT LTG, Date Established 03/09/18  -JK      Gait Training Goal PT LTG, Time to Achieve 5 - 7 days  -JK      Gait Training Goal PT LTG, [place] Level conditional independence  -JK      Gait Training Goal PT LTG, Assist Device walker, rolling  -JK      Gait Training Goal PT LTG, Distance to Achieve 150  -JK      Stair Training PT LTG    Stair Training Goal PT LTG, Date Established 03/09/18  -JK      Stair Training Goal PT LTG, Time to Achieve 5 - 7 days  -JK      Stair Training Goal PT LTG, Number of Steps 12  -JK      Stair Training Goal PT LTG, [place] Level supervision required  -JK      Stair Training Goal PT LTG, Assist Device 1 handrail  -JK        User Key  (r) = Recorded By, (t) = Taken By, (c) = Cosigned By    Initials Name Provider Type    RADHA Gomez, PT Physical Therapist                Outcome Measures       03/09/18 1229 03/09/18 1200       How much help from another person do you currently need...    Turning from your back to your side while in flat bed without using bedrails? 3  -JK      Moving from lying on back to sitting on the side of a flat bed without bedrails? 3  -JK      Moving to and from a bed to a chair (including a wheelchair)? 3  -JK      Standing up from a chair using your arms (e.g.,  wheelchair, bedside chair)? 3  -JK      Climbing 3-5 steps with a railing? 2  -JK      To walk in hospital room? 3  -JK      AM-PAC 6 Clicks Score 17  -JK      How much help from another is currently needed...    Putting on and taking off regular lower body clothing?  2  -SO     Bathing (including washing, rinsing, and drying)  3  -SO     Toileting (which includes using toilet bed pan or urinal)  3  -SO     Putting on and taking off regular upper body clothing  3  -SO     Taking care of personal grooming (such as brushing teeth)  3  -SO     Eating meals  3  -SO     Score  17  -SO     Functional Assessment    Outcome Measure Options AM-PAC 6 Clicks Basic Mobility (PT)  -JK AM-PAC 6 Clicks Daily Activity (OT)  -SO       User Key  (r) = Recorded By, (t) = Taken By, (c) = Cosigned By    Initials Name Provider Type    CLIFTON Brian, OTR Occupational Therapist    RADHA Gomez, PT Physical Therapist           Time Calculation:         PT Charges       03/09/18 1130          Time Calculation    Start Time 1000  -JK      Stop Time 1015  -JK      Time Calculation (min) 15 min  -JK        User Key  (r) = Recorded By, (t) = Taken By, (c) = Cosigned By    Initials Name Provider Type    RADHA Gomez, ARABELLA Physical Therapist          Therapy Charges for Today     Code Description Service Date Service Provider Modifiers Qty    54977712984 HC PT EVAL MOD COMPLEXITY 2 3/9/2018 China Gomez, PT GP 1    47600838017 HC PT THER SUPP EA 15 MIN 3/9/2018 China Gomez, PT GP 1          PT G-Codes  Outcome Measure Options: AM-PAC 6 Clicks Basic Mobility (PT)      China Gomez PT  3/9/2018

## 2018-03-09 NOTE — PLAN OF CARE
Problem: Inpatient Physical Therapy  Goal: Bed Mobility Goal LTG- PT  Outcome: Ongoing (interventions implemented as appropriate)   03/09/18 1228   Bed Mobility PT LTG   Bed Mobility PT LTG, Date Established 03/09/18   Bed Mobility PT LTG, Time to Achieve 5 - 7 days   Bed Mobility PT LTG, Activity Type all bed mobility   Bed Mobility PT LTG, [place] Level conditional independence     Goal: Transfer Training Goal 1 LTG- PT  Outcome: Ongoing (interventions implemented as appropriate)   03/09/18 1228   Transfer Training PT LTG   Transfer Training PT LTG, Date Established 03/09/18   Transfer Training PT LTG, Time to Achieve 5 - 7 days   Transfer Training PT LTG, Activity Type sit to stand/stand to sit   Transfer Training PT LTG, [place] Level conditional independence   Transfer Training PT LTG, Assist Device walker, rolling     Goal: Gait Training Goal LTG- PT  Outcome: Ongoing (interventions implemented as appropriate)   03/09/18 1228   Gait Training PT LTG   Gait Training Goal PT LTG, Date Established 03/09/18   Gait Training Goal PT LTG, Time to Achieve 5 - 7 days   Gait Training Goal PT LTG, [place] Level conditional independence   Gait Training Goal PT LTG, Assist Device walker, rolling   Gait Training Goal PT LTG, Distance to Achieve 150     Goal: Stair Training Goal LTG- PT  Outcome: Ongoing (interventions implemented as appropriate)   03/09/18 1228   Stair Training PT LTG   Stair Training Goal PT LTG, Date Established 03/09/18   Stair Training Goal PT LTG, Time to Achieve 5 - 7 days   Stair Training Goal PT LTG, Number of Steps 12   Stair Training Goal PT LTG, [place] Level supervision required   Stair Training Goal PT LTG, Assist Device 1 handrail

## 2018-03-09 NOTE — THERAPY EVALUATION
Acute Care - Occupational Therapy Initial Evaluation  [facility]     Patient Name: Anibal Freedman  : 1950  MRN: [id number]  Today's Date: 3/9/2018  Onset of Illness/Injury or Date of Surgery Date: 18  Date of Referral to OT: 18       Admit Date: 3/7/2018       ICD-10-CM ICD-9-CM   1. New onset ascites of liver R18.8 789.59   2. Acute on chronic congestive heart failure, unspecified congestive heart failure type I50.9 428.0   3. Elevated troponin R74.8 790.6   4. Coagulopathy on Eliquis D68.9 286.9   5. Atrial fibrillation, unspecified type I48.91 427.31   6. Gait abnormality R26.9 781.2     Patient Active Problem List   Diagnosis   • CHF exacerbation   • Right heart failure   • Ascites of liver   • Hypertension   • Atrial fibrillation   • Alcoholism   • History of coronary angioplasty   • DM2 (diabetes mellitus, type 2)   • Pancytopenia   • Chronic liver disease   • Elevated troponin   • CKD (chronic kidney disease)   • Hypomagnesemia   • Hypokalemia     Past Medical History:   Diagnosis Date   • Alcoholism    • Arthritis    • Atrial fibrillation     pt reported   • Cellulitis    • CHF (congestive heart failure)    • Colon polyps 2006    Colonoscopy w/ snare cautery, polypectomy & biopsy, PATH:  Sigmoid Colon Biopsy: focal vascular congestion & evidence of recent hemorrhage, non-specific.  Recto-Sigmoid Colon Biopsy: Fragments of tubular adenoam w/ mild dysplasia. Dr. Mildred You   • DM2 (diabetes mellitus, type 2) 3/7/2018   • History of coronary angioplasty    • History of MRSA infection     pt reported   • History of staph infection 2005   • Hypertension    • Infectious viral hepatitis     pt reported   • Prostate cancer 2010    S/P Radical Prostectomy, Adenocarcinoma, Primary [place] Grade 3, Secondar [place] Grade 3. Combined yissel score 6. Tumor involves rt & lt lobes, negative capsular margin, no invasion of seminal vesicles, no identified perineural invastion    • Withdrawal symptoms, alcohol      Past Surgical History:   Procedure Laterality Date   • APPENDECTOMY     • CARDIAC ABLATION Right 01/18/2013    Atrial Flutter Ablation, Dr. Otis Srinivasan   • CARDIAC CATHETERIZATION N/A 8/2/2016    Procedure: Left Heart Cath   ?right cath too;  Surgeon: Epifanio May MD;  Location: [facility] CATH INVASIVE LOCATION;  Service:    • CARDIAC CATHETERIZATION N/A 8/2/2016    Procedure: Coronary angiography;  Surgeon: Epifanio May MD;  Location: [facility] CATH INVASIVE LOCATION;  Service:    • CARDIAC CATHETERIZATION N/A 8/2/2016    Procedure: Left ventriculography;  Surgeon: Epifanio May MD;  Location: [facility] CATH INVASIVE LOCATION;  Service:    • CARDIAC CATHETERIZATION N/A 8/2/2016    Procedure: Right Heart Cath;  Surgeon: Epifanio May MD;  Location: [facility] CATH INVASIVE LOCATION;  Service:    • COLONOSCOPY N/A 3/15/2017    Procedure: COLONOSCOPY TO CECUM WITH COLD BIOPSY POLYECTOMY;  Surgeon: Anibal Bower MD;  Location: [facility] ENDOSCOPY;  Service:    • COLONOSCOPY W/ BIOPSIES AND POLYPECTOMY N/A 08/21/2006    Colonoscopy w/ snare cautery, polypectomy & biopsy, PATH:  Sigmoid Colon Biopsy: focal vascular congestion & evidence of recent hemorrhage, non-specific.  Recto-Sigmoid Colon Biopsy: Fragments of tubular adenoam w/ mild dysplasia. Dr. Mildred You   • CORONARY ANGIOPLASTY WITH STENT PLACEMENT      x 2    • CYSTOSCOPY N/A 04/28/2010    Cystoscopy w/ transurethral incision of the bladder neck, Dr. ANDREW Cordova   • CYSTOTOMY N/A 09/23/2011    Laparoscopic closure of incidental cystotomy, Laparoscopic incision of pelvic lymphocele, Dr. Jaime Royal   • JOINT REPLACEMENT     • LEG DEBRIDEMENT Left 08/19/2005    Left Thigh, Debridement skin & subcutaneous tissue muscle w/ closure via advancement flaps, Dr. Mildred You   • LEG DEBRIDEMENT Left 08/08/2005    Debridement of left anterior thigh, Dr. Mildred You   • PROSTATECTOMY N/A  04/28/2010    Adenocarcinoma, Primary [place] Grade 3, Secondar [place] Grade 3. Combined yissel score 6. Tumor involves rt & lt lobes, negative capsular margin, no invasion of seminal vesicles, no identified perineural invastion, Dr. Jaime Royal   • SKIN BIOPSY     • TONSILLECTOMY     • TOTAL KNEE ARTHROPLASTY Left 03/25/2008    Left total knee arthroplasty w/ Juan pinless computer navigation, Dr. Ashok Crocker   • WOUND DEBRIDEMENT Left 08/02/2005    Left Buttocks & Left thigh wounds, Debridement of left buttocks & left thigh wounds, Dr. Mildred You          OT ASSESSMENT FLOWSHEET (last 72 hours)      OT Evaluation       03/09/18 1154 03/09/18 1100 03/08/18 1700 03/08/18 1431 03/08/18 1421    Rehab Evaluation    Document Type evaluation  -SO evaluation  -JK       Subjective Information agree to therapy  -SO agree to therapy  -JK       Patient Effort, Rehab Treatment good  -SO good  -JK       Symptoms Noted During/After Treatment fatigue  -SO fatigue  -JK       General Information    Patient Profile Review yes  -SO yes  -JK       Onset of Illness/Injury or Date of Surgery Date  03/08/18  -JK       General Observations Pt supine in bed, nsg present  -SO WM supine in bed with IV running  -JK       Pertinent History Of Current Problem CHF, ETOH abuse  -SO 69 yo admit thru ER with ETOH abuse, chronic heart failure, prostate CA, CHF, chronic kidney disease  -JK       Precautions/Limitations fall precautions  -SO        Prior Level of Function independent:;ADL's  -SO independent:;all household mobility;bed mobility;transfer;ADL's;gait  -JK       Equipment Currently Used at Home none  -SO none   He has a RW at home but does not use it  -JK       Plans/Goals Discussed With  patient  -JK       Risks Reviewed  patient:  -JK       Living Environment    Lives With spouse  -SO spouse  -JK spouse  -DD  spouse  -PT    Living Arrangements house  -SO house  -JK house  -DD  house  -PT    Home Accessibility  bed and bath on  same level;stairs to enter home  -JK       Number of Stairs to Enter Home  5  -JK       Number of Stairs Within Home  12   pt lives in split level home  -JK       Stair Railings at Home    inside, present at both sides  -PT     Transportation Available    car  -PT     Clinical Impression    Date of Referral to OT 03/09/18  -SO        Criteria for Skilled Therapeutic Interventions Met yes  -SO        Rehab Potential good, to achieve stated therapy goals  -SO        Therapy Frequency 3-5 times/wk  -SO        Anticipated Discharge Disposition home with home health;home with assist  -SO        Vital Signs    Pretreatment Heart Rate (beats/min)  69  -JK       Intratreatment Heart Rate (beats/min)  81  -JK       Posttreatment Heart Rate (beats/min)  73  -JK       Vision Assessment/Intervention    Visual Impairment  WFL with corrective lenses  -JK       Cognitive Assessment/Intervention    Current Cognitive/Communication Assessment functional  -SO functional  -JK       Orientation Status oriented to;person;place;situation  -SO oriented to;person;place;situation  -JK       Follows Commands/Answers Questions 100% of the time  -% of the time;able to follow single-step instructions  -JK       Personal Safety decreased insight to deficits  -SO mild impairment  -JK       Personal Safety Interventions gait belt;fall prevention program maintained  -SO        ROM (Range of Motion)    General ROM no range of motion deficits identified  -SO        General ROM Detail  LE grossly WFL   limited by size of abdomen and edema in LEs  -JK       MMT (Manual Muscle Testing)    General MMT Assessment Detail Generalized weakness BUE 3+/5  -SO B hip flex 3+/5; B knee and ankle  grossly 4/5  -JK       Bed Mobility, Assessment/Treatment    Bed Mobility, Assistive Device  bed rails;head of bed elevated  -JK       Bed Mob, Supine to Sit, [place] contact guard assist  -SO contact guard assist  -JK       Bed Mobility, Impairments  ROM  decreased;strength decreased;impaired balance  -JK       Transfer Assessment/Treatment    Transfers, Sit-Stand [place] contact guard assist;minimum assist (75% patient effort)  -SO contact guard assist;1 person + 1 person to manage equipment;verbal cues required  -JK       Transfers, Stand-Sit [place] contact guard assist;verbal cues required  -SO contact guard assist;verbal cues required;1 person + 1 person to manage equipment  -JK       Transfers, Sit-Stand-Sit, Assist Device --   HHA  -SO other (see comments)   tsf to stand without AD initially  -JK       Toilet Transfer, [place] contact guard assist;verbal cues required  -SO        Toilet Transfer, Assistive Device --   Grab bar  -SO        Transfer, Safety Issues  weight-shifting ability decreased;step length decreased  -JK       Transfer, Impairments  ROM decreased;impaired balance;strength decreased  -JK       Functional Mobility    Functional Mobility- Ind. Level minimum assist (75% patient effort);verbal cues required  -SO        Functional Mobility- Device --   HHA  -SO        Functional Mobility- Comment Walks from room to bathroom and back, attempts to reach for table, wall, etc. Unsteady.  -SO        Lower Body Dressing Assessment/Training    LB Dressing Assess/Train, Clothing Type donning:;slipper socks  -SO        LB Dressing Assess/Train, Position sitting;edge of bed  -SO        LB Dressing Assess/Train, [place] supervision required;set up required  -SO        LB Dressing Assess/Train, Comment Increased time to perform  -SO        Toileting Assessment/Training    Toileting Assess/Train, Assistive Device grab bars  -SO        Toileting Assess/Train, Position sitting;standing  -SO        Toileting Assess/Train, Indepen Level minimum assist (75% patient effort);verbal cues required  -SO        Toileting Assess/Train, Comment Requires A to thread BLE clothing following toileting, CGA to stand and hike  -SO        Grooming  Assessment/Training    Grooming Assess/Train, Position standing;sink side  -SO        Grooming Assess/Train, Indepen Level contact guard assist;supervision required;set up required  -SO        Therapy Exercises    Bilateral Lower Extremities  AROM:;10 reps;ankle pumps/circles;LAQ  -JK       Positioning and Restraints    Pre-Treatment Position in bed  -SO in bed  -JK       Post Treatment Position bed  -SO chair  -JK       In Bed sitting;sitting EOB;call light within reach   With MD  -SO        In Chair  reclined;call light within reach;notified ns  -JK         03/08/18 1407 03/07/18 1838 03/07/18 1826          General Information    Equipment Currently Used at Home   none  -EK      Living Environment    Lives With  spouse  -EK       Living Arrangements  house  -EK       Home Accessibility  bed and bath on same level;stairs within home  -EK       Number of Stairs Within Home  5  -EK       Stair Railings at Home  inside, present at both sides  -EK       Type of Financial/Environmental Concern  none  -EK       Transportation Available  car;family or friend will provide  -EK       Living Environment Comment  na  -EK       Functional Level Prior    Ambulation 0-->independent  -PT  0-->independent  -EK      Transferring 0-->independent  -PT  0-->independent  -EK      Toileting 0-->independent  -PT  0-->independent  -EK      Bathing 0-->independent  -PT  0-->independent  -EK      Dressing 0-->independent  -PT  0-->independent  -EK      Eating 0-->independent  -PT  0-->independent  -EK      Communication 0-->understands/communicates without difficulty  -PT  0-->understands/communicates without difficulty  -EK      Swallowing 0-->swallows foods/liquids without difficulty  -PT  0-->swallows foods/liquids without difficulty  -EK      Prior Functional Level Comment   na  -EK        User Key  (r) = Recorded By, (t) = Taken By, (c) = Cosigned By    Initials Name Effective Dates    SO Jackie Brian, OTR 04/13/15 -     RADHA  China Gomez, PT 12/01/15 -     PT Estelita Fuchs, SAMARA 06/16/16 -     DD Nimo Barrera [facility] 04/06/17 -     EK Makenna Felipe, RN 08/04/17 -            Occupational Therapy Education     Title: PT OT SLP Therapies (Active)     Topic: Occupational Therapy (Active)     Point: ADL training (Done)    Description: Instruct learner(s) on proper safety adaptation and remediation techniques during self care or transfers.   Instruct in proper use of assistive devices.    Learning Progress Summary    Learner Readiness Method Response Comment Documented by Status   Patient Acceptance E VU  SO 03/09/18 1211 Done                      User Key     Initials Effective Dates Name Provider Type Discipline    SO 04/13/15 -  Jackie Brian OTR Occupational Therapist OT                  OT Recommendation and Plan  Anticipated Discharge Disposition: home with home health, home with assist  Therapy Frequency: 3-5 times/wk  Plan of Care Review  Plan Of Care Reviewed With: patient  Outcome Summary/Follow up Plan: Pt presents with some generalized weakness and slight balance deficits, able to walk to bathroom but requires min A for unsteadiness. Does require some A with LBD after toileting. May benefit from OT to address ADLs.          OT Goals       03/09/18 1212          Transfer Training OT LTG    Transfer Training OT LTG, Date Established 03/09/18  -SO      Transfer Training OT LTG, Time to Achieve 1 wk  -SO      Transfer Training OT LTG, Activity Type sit to stand/stand to sit;toilet  -SO      Transfer Training OT LTG, [place] Level supervision required  -SO      Transfer Training OT LTG, Assist Device walker, rolling  -SO      Strength OT LTG    Strength Goal OT LTG, Date Established 03/09/18  -SO      Strength Goal OT LTG, Time to Achieve 1 wk  -SO      Strength Goal OT LTG, Measure to Achieve Pt to increase BUE strength to 4-/5 to assist in ADLs at d/c  -SO      ADL OT LTG    ADL OT LTG, Date  Established 03/09/18  -SO      ADL OT LTG, Time to Achieve 1 wk  -SO      ADL OT LTG, Activity Type ADL skills  -SO      ADL OT LTG, [place] Level standby assist  -SO      ADL OT LTG, Additional Goal AD if necessary  -SO        User Key  (r) = Recorded By, (t) = Taken By, (c) = Cosigned By    Initials Name Provider Type    SO KENNETH Jackson Occupational Therapist                Outcome Measures       03/09/18 1200          How much help from another is currently needed...    Putting on and taking off regular lower body clothing? 2  -SO      Bathing (including washing, rinsing, and drying) 3  -SO      Toileting (which includes using toilet bed pan or urinal) 3  -SO      Putting on and taking off regular upper body clothing 3  -SO      Taking care of personal grooming (such as brushing teeth) 3  -SO      Eating meals 3  -SO      Score 17  -SO      Functional Assessment    Outcome Measure Options AM-PAC 6 Clicks Daily Activity (OT)  -SO        User Key  (r) = Recorded By, (t) = Taken By, (c) = Cosigned By    Initials Name Provider Type    KENNETH Peña Occupational Therapist          Time Calculation:   OT Start Time: 0806  OT Stop Time: 0829  OT Time Calculation (min): 23 min    Therapy Charges for Today     Code Description Service Date Service Provider Modifiers Qty    [id number]  OT SELF CARE/MGMT/TRAIN EA 15 MIN 3/9/2018 KENNETH Jackson GO 1    [id number]  OT EVAL MOD COMPLEXITY 2 3/9/2018 KENNETH Jackson GO 1               KENNETH Jackson  3/9/2018

## 2018-03-09 NOTE — PROGRESS NOTES
"Daily progress note    Chief complaint  Doing same  No specific complaints    History of present illness  68-year-old white male who is well-known to our service from multiple admissions in the past with history of chronic kidney disease stage III hypertension diastolic CHF prostate cancer coronary artery disease depression with atrial fibrillation on Eliquis presented to [facility] emergency room with lower extremity swelling and weight gain shortness of breath.  Patient evaluated in ER found to be in decompensated CHF cor pulmonale and anasarca admitted for management.  Patient also continued drink and needs detoxification.  Patient denies any chest pain fever chills cough abdominal pain vomiting diarrhea but he has been nauseated.     REVIEW OF SYSTEMS  Review of Systems   Constitutional: Negative for activity change, appetite change and fever.   HENT: Negative for congestion and sore throat.    Eyes: Negative.    Respiratory: Positive for shortness of breath (on exertion). Negative for cough.    Cardiovascular: Positive for leg swelling (BLE). Negative for chest pain.   Gastrointestinal: Positive for abdominal distention. Negative for abdominal pain, diarrhea and vomiting.   Endocrine: Negative.    Genitourinary: Negative for decreased urine volume and dysuria.   Musculoskeletal: Negative for neck pain.   Skin: Negative for rash and wound.   Allergic/Immunologic: Negative.    Neurological: Negative for weakness, numbness and headaches.   Hematological: Negative.    Psychiatric/Behavioral: Negative.    All other systems reviewed and are negative.     PHYSICAL EXAM  Blood pressure 106/83, pulse 58, temperature 97.4 °F (36.3 °C), temperature source Oral, resp. rate 16, height 180.3 cm (71\"), weight 99.8 kg (220 lb), SpO2 98 %.    Constitutional: He is oriented to person, place, and time and well-developed, well-nourished, and in no distress.   chronically ill appearring, older than stated age   Head: " Normocephalic and atraumatic.   Eyes: EOM are normal. Pupils are equal, round, and reactive to light. No scleral icterus.   Neck: Normal range of motion. Neck supple.   Cardiovascular: Normal rate and normal heart sounds.  An irregularly irregular rhythm present.   Pulmonary/Chest: Effort normal and breath sounds normal. No respiratory distress.   Crackles in the bases of the lungs   Abdominal: Soft. There is no tenderness. There is no rebound and no guarding.   Ascites severe   Musculoskeletal: Normal range of motion. He exhibits edema (2+ in BLE).   Neurological: He is alert and oriented to person, place, and time. He has normal sensation and normal strength.   Skin: Skin is warm and dry.   Psychiatric: Mood and affect normal.     LAB RESULTS  Lab Results (last 24 hours)     Procedure Component Value Units Date/Time    POC Glucose Once [[id number]]  (Normal) Collected:  03/08/18 1733    Specimen:  Blood Updated:  03/08/18 1736     Glucose 105 mg/dL     Narrative:       Meter: [id number] : [id number] Beltran OCAMPO    Urinalysis With / Microscopic If Indicated - [[id number]]  (Normal) Collected:  03/08/18 1751    Specimen:  Urine Updated:  03/08/18 1804     Color, UA Yellow     Appearance, UA Clear     pH, UA 5.5     Specific Gravity, UA 1.010     Glucose, UA Negative     Ketones, UA Negative     Bilirubin, UA Negative     Blood, UA Negative     Protein, UA Negative     Leuk Esterase, UA Negative     Nitrite, UA Negative     Urobilinogen, UA 1.0 E.U./dL    Narrative:       Urine microscopic not indicated.    POC Glucose Once [[id number]]  (Normal) Collected:  03/08/18 2025    Specimen:  Blood Updated:  03/08/18 2029     Glucose 124 mg/dL     Narrative:       Meter: [id number] : [id number] Pj ARCEO    Potassium [[id number]]  (Normal) Collected:  03/08/18 2034    Specimen:  Blood Updated:  03/08/18 2114     Potassium 4.0 mmol/L     AFP Tumor Marker [[id number]] Collected:  03/09/18 0412    Specimen:   Blood Updated:  03/09/18 0442    CBC & Differential [[id number]] Collected:  03/09/18 0412    Specimen:  Blood Updated:  03/09/18 0452    Narrative:       The following orders were created for panel order CBC & Differential.  Procedure                               Abnormality         Status                     ---------                               -----------         ------                     CBC Auto Differential[[id number]]        Abnormal            Final result                 Please view results for these tests on the individual orders.    CBC Auto Differential [[id number]]  (Abnormal) Collected:  03/09/18 0412    Specimen:  Blood Updated:  03/09/18 0452     WBC 4.77 10*3/mm3      RBC 3.51 (L) 10*6/mm3      Hemoglobin 11.0 (L) g/dL      Hematocrit 34.4 (L) %      MCV 98.0 (H) fL      MCH 31.3 pg      MCHC 32.0 (L) g/dL      RDW 14.4 %      RDW-SD 50.7 fl      MPV 12.4 (H) fL      Platelets 131 (L) 10*3/mm3      Neutrophil % 61.9 %      Lymphocyte % 20.1 %      Monocyte % 17.4 (H) %      Eosinophil % 0.2 (L) %      Basophil % 0.4 %      Immature Grans % 0.0 %      Neutrophils, Absolute 2.95 10*3/mm3      Lymphocytes, Absolute 0.96 10*3/mm3      Monocytes, Absolute 0.83 10*3/mm3      Eosinophils, Absolute 0.01 10*3/mm3      Basophils, Absolute 0.02 10*3/mm3      Immature Grans, Absolute 0.00 10*3/mm3      nRBC 0.0 /100 WBC     Lipid Panel [[id number]] Collected:  03/09/18 0412    Specimen:  Blood Updated:  03/09/18 0512     Total Cholesterol 103 mg/dL      Triglycerides 64 mg/dL      HDL Cholesterol 51 mg/dL      LDL Cholesterol  39 mg/dL      VLDL Cholesterol 12.8 mg/dL      LDL/HDL Ratio 0.77    Narrative:       Cholesterol Reference Ranges  (U.S. Department of Health and Human Services ATP III Classifications)    Desirable          <200 mg/dL  Borderline High    200-239 mg/dL  High Risk          >240 mg/dL      Triglyceride Reference Ranges  (U.S. Department of Health and Human Services ATP III  Classifications)    Normal           <150 mg/dL  Borderline High  150-199 mg/dL  High             200-499 mg/dL  Very High        >500 mg/dL    HDL Reference Ranges  (U.S. Department of Health and Human Services ATP III Classifcations)    Low     <40 mg/dl (major risk factor for CHD)  High    >60 mg/dl ('negative' risk factor for CHD)        LDL Reference Ranges  (U.S. Department of Health and Human Services ATP III Classifcations)    Optimal          <100 mg/dL  Near Optimal     100-129 mg/dL  Borderline High  130-159 mg/dL  High             160-189 mg/dL  Very High        >189 mg/dL    Uric Acid [[id number]]  (Abnormal) Collected:  03/09/18 0412    Specimen:  Blood Updated:  03/09/18 0512     Uric Acid 8.7 (H) mg/dL     Comprehensive Metabolic Panel [[id number]]  (Abnormal) Collected:  03/09/18 0412    Specimen:  Blood Updated:  03/09/18 0516     Glucose 100 (H) mg/dL      BUN 32 (H) mg/dL      Creatinine 1.78 (H) mg/dL      Sodium 139 mmol/L      Potassium 3.7 mmol/L      Chloride 92 (L) mmol/L      CO2 29.0 mmol/L      Calcium 8.2 (L) mg/dL      Total Protein 5.9 (L) g/dL      Albumin 3.20 (L) g/dL      ALT (SGPT) 14 U/L      AST (SGOT) 31 U/L      Alkaline Phosphatase 99 U/L      Total Bilirubin 1.3 (H) mg/dL      eGFR Non African Amer 38 (L) mL/min/1.73      Globulin 2.7 gm/dL      A/G Ratio 1.2 g/dL      BUN/Creatinine Ratio 18.0     Anion Gap 18.0 mmol/L     BNP [[id number]]  (Abnormal) Collected:  03/09/18 0412    Specimen:  Blood Updated:  03/09/18 0521     proBNP 08322.0 (H) pg/mL     Narrative:       Among patients with dyspnea, NT-proBNP is highly sensitive for the detection of acute congestive heart failure. In addition NT-proBNP of <300 pg/ml effectively rules out acute congestive heart failure with 99% negative predictive value.    TSH [[id number]]  (Abnormal) Collected:  03/09/18 0412    Specimen:  Blood Updated:  03/09/18 0521     TSH 4.960 (H) mIU/mL     Protime-INR [[id number]]  (Abnormal) Collected:   03/09/18 0703    Specimen:  Blood Updated:  03/09/18 0732     Protime 21.1 (H) Seconds      INR 1.86 (H)    POC Glucose Once [[id number]]  (Normal) Collected:  03/09/18 0759    Specimen:  Blood Updated:  03/09/18 0800     Glucose 90 mg/dL     Narrative:       Meter: [id number] : [id number] Jaquelin Harrell    Magnesium [[id number]]  (Abnormal) Collected:  03/09/18 0412    Specimen:  Blood Updated:  03/09/18 0837     Magnesium 1.4 (C) mg/dL     POC Glucose Once [[id number]]  (Normal) Collected:  03/09/18 1218    Specimen:  Blood Updated:  03/09/18 1231     Glucose 118 mg/dL     Narrative:       Meter: [id number] : [id number] Jaquelin Harrell    Albumin, Fluid - Body Fluid, Peritoneum [[id number]] Collected:  03/09/18 1410    Specimen:  Body Fluid from Peritoneum Updated:  03/09/18 1427    Protein, Body Fluid - Body Fluid, Peritoneum [[id number]] Collected:  03/09/18 1410    Specimen:  Body Fluid from Peritoneum Updated:  03/09/18 1427    Body Fluid Cell Count With Differential - Body Fluid, Peritoneum [[id number]] Collected:  03/09/18 1410    Specimen:  Body Fluid from Peritoneum Updated:  03/09/18 1427    Narrative:       The following orders were created for panel order Body Fluid Cell Count With Differential - Body Fluid, Peritoneum.  Procedure                               Abnormality         Status                     ---------                               -----------         ------                     Body fluid cell count - ...[[id number]]                      In process                   Please view results for these tests on the individual orders.    Body Fluid Culture - Body Fluid, Peritoneum [[id number]] Collected:  03/09/18 1410    Specimen:  Body Fluid from Peritoneum Updated:  03/09/18 1427    Body fluid cell count - Body Fluid, [[id number]] Collected:  03/09/18 1410    Specimen:  Body Fluid from Peritoneum Updated:  03/09/18 1427        Imaging Results (last 24 hours)     ** No results  found for the last 24 hours. **        EKG:  Rate: 95  afib with IVCD, frequent PVCs,   diffuse nonspecific ST and atT wave changes, difference from. 08 2016 when he was in a narrow complex, otherwise similar..      Current Facility-Administered Medications:   •  acetaminophen (TYLENOL) tablet 650 mg, 650 mg, Oral, Q4H PRN, FRANK Moreno  •  albumin human 25 % IV SOLN 50 g, 50 g, Intravenous, Once, Joseph Trinidad MD  •  allopurinol (ZYLOPRIM) tablet 100 mg, 100 mg, Oral, Daily, Gal Randall MD, 100 mg at 03/09/18 0831  •  apixaban (ELIQUIS) tablet 2.5 mg, 2.5 mg, Oral, Q12H, Gal Randall MD  •  dextrose (D50W) solution 25 g, 25 g, Intravenous, Q15 Min PRN, Soniya Thomas MD  •  dextrose (GLUTOSE) oral gel 15 g, 15 g, Oral, Q15 Min PRN, Soniya Thomas MD  •  FLUoxetine (PROzac) capsule 20 mg, 20 mg, Oral, Daily, FRANK Moreno, 20 mg at 03/09/18 0831  •  folic acid (FOLVITE) tablet 1 mg, 1 mg, Oral, Daily, Gal Randall MD, 1 mg at 03/09/18 0831  •  glucagon (human recombinant) (GLUCAGEN DIAGNOSTIC) injection 1 mg, 1 mg, Subcutaneous, PRN, Soniya Thomas MD  •  insulin aspart (novoLOG) injection 0-9 Units, 0-9 Units, Subcutaneous, 4x Daily With Meals & Nightly, Soniya Thomas MD  •  ipratropium-albuterol (DUO-NEB) nebulizer solution 3 mL, 3 mL, Nebulization, Q4H - RT, Gal Randall MD, 3 mL at 03/09/18 1118  •  metoprolol tartrate (LOPRESSOR) tablet 25 mg, 25 mg, Oral, Q12H, FRANK Moreno, 25 mg at 03/09/18 0832  •  multivitamin (THERAGRAN) tablet 1 tablet, 1 tablet, Oral, Daily, Gal Randall MD, 1 tablet at 03/09/18 1202  •  neomycin-bacitracin-polymyxin (NEOSPORIN) ointment 1 application, 1 application, Topical, Daily, Gal Randall MD, 1 application at 03/09/18 0832  •  pantoprazole (PROTONIX) EC tablet 40 mg, 40 mg, Oral, Daily, Zakiya Valverde, APRN, 40 mg at 03/09/18 0831  •  potassium chloride (MICRO-K) CR capsule 40 mEq, 40 mEq, Oral, PRN, Epifanio May MD, 40 mEq at 03/08/18 0317  •  sodium  chloride 0.9 % flush 1-10 mL, 1-10 mL, Intravenous, PRN, Zakiya Valverde, APRN  •  thiamine (VITAMIN B-1) tablet 100 mg, 100 mg, Oral, Daily, Victor Manuel Randall MD, 100 mg at 03/09/18 0831     ASSESSMENT  Decompensated CHF  Anasarca  Volume overload  Cirrhosis with ascites  Hypertension  Alcohol abuse  Elevated troponin with known coronary artery disease  Prostate cancer  Depression  Paroxysmal atrial fibrillation on ELIQUIS    PLAN  CPM  Diuresis with strict is and os and daily weight  Continue home medication and adjust the doses  Detox with alcohol withdrawal precautions  Supportive care  Stress ulcer prophylaxis  Cardiology and gastroenterology and nephrology following patient  We'll follow     VICTOR MANUEL RANDALL MD

## 2018-03-09 NOTE — PROGRESS NOTES
[facility]  Cardiology Progress Note    Patient Identification:  Name: Anibal Freedman  Age: 68 y.o.  Sex: male  :  1950  MRN: [id number]                 Follow Up / Chief Complaint: Shortness of breath, swelling, history of CAD, permanent A. fib, systolic heart failure    Interval History:  68-year-old alcoholic with permanent A. fib, CAD and biventricular heart failure admitted with acute on chronic heart failure, ascites/anasara/ cor pulmonale, electrolyte imbalance     Subjective:  Denies chest pain, palpitations or dizziness.  Denies cough.    Objective:  Afib, cvr  BP 90's -105 / 60's-70's     If accurate, 16lb weight loss  BUN 32 (27), Cr 1.78 (1.38)  Mag 1.4    Past Medical History:  Past Medical History:   Diagnosis Date   • Alcoholism    • Arthritis    • Atrial fibrillation     pt reported   • Cellulitis    • CHF (congestive heart failure)    • Colon polyps 2006    Colonoscopy w/ snare cautery, polypectomy & biopsy, PATH:  Sigmoid Colon Biopsy: focal vascular congestion & evidence of recent hemorrhage, non-specific.  Recto-Sigmoid Colon Biopsy: Fragments of tubular adenoam w/ mild dysplasia. Dr. Mildred You   • DM2 (diabetes mellitus, type 2) 3/7/2018   • History of coronary angioplasty    • History of MRSA infection     pt reported   • History of staph infection 2005   • Hypertension    • Infectious viral hepatitis     pt reported   • Prostate cancer 2010    S/P Radical Prostectomy, Adenocarcinoma, Primary Yissel Grade 3, Secondar Yissel Grade 3. Combined yissel score 6. Tumor involves rt & lt lobes, negative capsular margin, no invasion of seminal vesicles, no identified perineural invastion   • Withdrawal symptoms, alcohol      Past Surgical History:  Past Surgical History:   Procedure Laterality Date   • APPENDECTOMY     • CARDIAC ABLATION Right 2013    Atrial Flutter Ablation, Dr. Otis Srinivasan   • CARDIAC CATHETERIZATION N/A 2016    Procedure:  Left Heart Cath   ?right cath too;  Surgeon: Epifanio May MD;  Location: [facility] CATH INVASIVE LOCATION;  Service:    • CARDIAC CATHETERIZATION N/A 8/2/2016    Procedure: Coronary angiography;  Surgeon: Epifanio May MD;  Location:  [facility] CATH INVASIVE LOCATION;  Service:    • CARDIAC CATHETERIZATION N/A 8/2/2016    Procedure: Left ventriculography;  Surgeon: Epifanio May MD;  [facility] C[facility]AS[facility];  Service:    • CARDIAC CATHETERIZATION N/A 8/2/2016    Procedure: Right Heart Cath;  Surgeon: Epifanio May MD;  [facility];  Service:    • COLONOSCOPY N/A 3/15/2017    Procedure: COLONOSCOPY TO CECUM WITH COLD BIOPSY POLYECTOMY;  Surgeon: Anibal Bower MD;  Location: [facility] ENDOSCOPY;  Service:    • COLONOSCOPY W/ BIOPSIES AND POLYPECTOMY N/A 08/21/2006    Colonoscopy w/ snare cautery, polypectomy & biopsy, PATH:  Sigmoid Colon Biopsy: focal vascular congestion & evidence of recent hemorrhage, non-specific.  Recto-Sigmoid Colon Biopsy: Fragments of tubular adenoam w/ mild dysplasia. Dr. Mildred You   • CORONARY ANGIOPLASTY WITH STENT PLACEMENT      x 2    • CYSTOSCOPY N/A 04/28/2010    Cystoscopy w/ transurethral incision of the bladder neck, Dr. ANDREW Cordova   • CYSTOTOMY N/A 09/23/2011    Laparoscopic closure of incidental cystotomy, Laparoscopic incision of pelvic lymphocele, Dr. Jaime Royal   • JOINT REPLACEMENT     • LEG DEBRIDEMENT Left 08/19/2005    Left Thigh, Debridement skin & subcutaneous tissue muscle w/ closure via advancement flaps, Dr. Mildred You   • LEG DEBRIDEMENT Left 08/08/2005    Debridement of left anterior thigh, Dr. Mildred You   • PROSTATECTOMY N/A 04/28/2010    Adenocarcinoma, Primary Yissel Grade 3, Secondar [place] Grade 3. Combined yissel score 6. Tumor involves rt & lt lobes, negative capsular margin, no invasion of seminal vesicles, no identified perineural invastion, Dr. Sandoval  Lele   • SKIN BIOPSY     • TONSILLECTOMY     • TOTAL KNEE ARTHROPLASTY Left 03/25/2008    Left total knee arthroplasty w/ Juan pinless computer navigation, Dr. Ashok Crocker   • WOUND DEBRIDEMENT Left 08/02/2005    Left Buttocks & Left thigh wounds, Debridement of left buttocks & left thigh wounds, Dr. Mildred You        Social History:   Social History   Substance Use Topics   • Smoking status: Former Smoker     Packs/day: 1.50     Quit date: 3/15/2007   • Smokeless tobacco: Not on file   • Alcohol use 3.6 oz/week     6 Cans of beer per week      Comment: 1 pint a day OR MORE OF VODKA      Family History:  Family History   Problem Relation Age of Onset   • Heart disease Mother    • Alcohol abuse Father    • Liver cancer Father    • Cancer Sister    • Hypertension Brother    • Alcohol abuse Brother    • Skin cancer Brother           Allergies:  No Known Allergies  Scheduled Meds:    allopurinol 100 mg Daily   apixaban 2.5 mg Q12H   bumetanide 1 mg Q12H   FLUoxetine 20 mg Daily   folic acid 1 mg Daily   insulin aspart 0-9 Units 4x Daily With Meals & Nightly   ipratropium-albuterol 3 mL Q4H - RT   LORazepam 0.5 mg Q8H   magnesium sulfate 2 g Once   metoprolol tartrate 25 mg Q12H   multivitamin 1 tablet Daily   neomycin-bacitracin-polymyxin 1 application Daily   pantoprazole 40 mg Daily   thiamine 100 mg Daily           INTAKE AND OUTPUT:    Intake/Output Summary (Last 24 hours) at 03/09/18 0844  Last data filed at 03/09/18 0612   Gross per 24 hour   Intake              810 ml   Output              350 ml   Net              460 ml       Review of Systems:   GI:  No nausea or vomiting  Cardiac: No chest pain or palpitations   Pulmonary: Visibly less short of breath, no cough    Constitutional:  Temp:  [97.5 °F (36.4 °C)-97.8 °F (36.6 °C)] 97.5 °F (36.4 °C)  Heart Rate:  [60-70] 64  Resp:  [14-18] 18  BP: ()/(66-89) 109/89    Physical Exam:  General:  Appears chronically ill but, in no acute distress  Eyes:  PERTL,  HEENT:  + JVD lying supine No mucosal pallor or cyanosis  Respiratory: Respirations regular with mild use of accessory muscles at rest on supplemental O2 per nasal cannula   BBS with decreased air entry in R>L lower fields.  No wheezes auscultated  Cardiovascular: S1S2 irregular rate and rhythm. No murmur or rub. 1-2+ LE edema, no appreciable improvement in sacral edema or ascites  Gastrointestinal: Abdomen distended with ascites- dimension appears more distended this morning  Bowel sounds present.  Musculoskeletal: PEDRO x4. No abnormal movements  Extremities:  fingers and toes bita  Skin: Skin warm and dry to touch on torso  LE cool to touch below.  Scabbed lesions on great toe and both shins.   Neuro: AAO x3 CN II-XII grossly intact  Psych: Mood and affect normal, pleasant and cooperative        Cardiographics  Telemetry: [facility] cvr  60's      3-9-18 @0747:         Echocardiogram: results pending      R&L heart cath 8-2016:  HEMODYNAMIC / ANGIOGRAPHIC DATA:    .   1. Pulmonary artery systolic pressure was 55/25.  2. Right atrial pressure was 10..  3. Left ventricular end diastolic pressure was 15 mmHg.  4. The left main is normal.  5. The LAD is proximal stent had 40-50% stenosis  6. Nondominant circumflex free of any atherosclerotic narrowing.  7. The right coronary artery is dominant with a diffuse proximal 50% mid 50-60% stenosis.  8. Mild pulmonary hypertension    CXR IMPRESSION:  Borderline cardiomegaly and small left pleural effusion.    CT Abdomen IMPRESSION:  1. Small left pleural effusion.  2. Large amount of ascites.  3. Liver appears somewhat small. Please correlate for cirrhosis.     Lab Review     Results from last 7 days  Lab Units 03/08/18  0601 03/08/18  0006 03/07/18  2040   TROPONIN T ng/mL 0.038* 0.038* 0.040*       Results from last 7 days  Lab Units 03/09/18  0412   MAGNESIUM mg/dL 1.4*       Results from last 7 days  Lab Units 03/09/18  0412   SODIUM mmol/L 139   POTASSIUM mmol/L 3.7    BUN mg/dL 32*   CREATININE mg/dL 1.78*   CALCIUM mg/dL 8.2*       Results from last 7 days  Lab Units 03/09/18  0412 03/07/18  1338   WBC 10*3/mm3 4.77 3.90*   HEMOGLOBIN g/dL 11.0* 11.2*   HEMATOCRIT % 34.4* 34.4*   PLATELETS 10*3/mm3 131* 114*       Results from last 7 days  Lab Units 03/09/18  0703 03/07/18  1639   INR  1.86* 1.31*   APTT seconds  --  29.7         Assessment:  - a/c R>L systolic heart failure  - cor pulmonale / anasarca / ascites   - CKD III   - Anemia  - alcoholism  - recurrent hypomagnesia  - Thrombocytopenia  - CAD - h/o BMS LAD, kissing balloon to adjacent diagonal,  BMS-> mid-distal RCA 1-2013  - (Permanent) atrial fibrillation -> Eliquis      - s/p hypokalemia  - h/o atrial flutter ablation 2013      Plan:  - a/c R>L systolic heart failure -  Symptomatic improvement, however significant ascites remains On beta blocker and diuretic as per nephrology. No ACE-I or ARB due to CKD. Review echo.    - cor pulmonale / anasarca / ascites -   improved lower extremity edema with Bumex , but significant ascites remains. Diuretic management as per Nephrology.  Interventional radiology to evaluate CT and need for possible paracentesis.  GI to see for suspected cirrhosis    - (recurrent) hypomagnesia -  Mg down to 1.4 this am. (was 0.9 on admit)    - CAD - No chest pain.  MI ruled out.  Stress test pending H/o LAD& RCA PCI & diagonal PTCA 2013. Non obstructive CAD per cath 2016.  Ischemic workup once medically stable    - (Permanent) atrial fibrillation -> on Eliquis. Rate controlled. H/o atrial flutter ablation 2013    Placement magnesium per protocol.  Resume Eliquis post paracentesis and proceed with pharmacologic stress test.  Diuretic management as per nephrology.  Case management investigating transfer to rehabilitation at time of discharge    Labs/tests ordered: Medication adjustment, stress test    I reviewed the patient's new clinical results and treatment plan  I personally viewed and  "interpreted the patient's EKG/Telemetry data    )3/9/2018  Epifanio May MD      EMR Dragon/Transcription:   \"Dictated utilizing Dragon dictation\".     "

## 2018-03-09 NOTE — PROGRESS NOTES
[facility] ([facility]  Inpatient Progress Note    Reason for Follow Up:  Cirrhosis    Subjective     Interval History:   Pt without complaitns.      Current Facility-Administered Medications:   •  acetaminophen (TYLENOL) tablet 650 mg, 650 mg, Oral, Q4H PRN, FRANK Moreno  •  allopurinol (ZYLOPRIM) tablet 100 mg, 100 mg, Oral, Daily, Gal Randall MD  •  apixaban (ELIQUIS) tablet 2.5 mg, 2.5 mg, Oral, Q12H, Gal Randall MD  •  bumetanide (BUMEX) injection 1 mg, 1 mg, Intravenous, Q12H, Joseph Trinidad MD, 1 mg at 03/08/18 1850  •  dextrose (D50W) solution 25 g, 25 g, Intravenous, Q15 Min PRN, Soniya Thomas MD  •  dextrose (GLUTOSE) oral gel 15 g, 15 g, Oral, Q15 Min PRN, Jawonel Thomas MD  •  FLUoxetine (PROzac) capsule 20 mg, 20 mg, Oral, Daily, FRANK Moreno, 20 mg at 03/08/18 0856  •  folic acid (FOLVITE) tablet 1 mg, 1 mg, Oral, Daily, Gal Randall MD, 1 mg at 03/08/18 2010  •  glucagon (human recombinant) (GLUCAGEN DIAGNOSTIC) injection 1 mg, 1 mg, Subcutaneous, PRN, Soniya Thomas MD  •  insulin aspart (novoLOG) injection 0-9 Units, 0-9 Units, Subcutaneous, 4x Daily With Meals & Nightly, Soniya Thomas MD  •  ipratropium-albuterol (DUO-NEB) nebulizer solution 3 mL, 3 mL, Nebulization, Q4H - RT, Gal Randall MD, 3 mL at 03/09/18 0721  •  [COMPLETED] LORazepam (ATIVAN) tablet 0.5 mg, 0.5 mg, Oral, Q6H, 0.5 mg at 03/08/18 1515 **FOLLOWED BY** LORazepam (ATIVAN) tablet 0.5 mg, 0.5 mg, Oral, Q8H, Gal Randall MD, 0.5 mg at 03/09/18 0432  •  metoprolol tartrate (LOPRESSOR) tablet 25 mg, 25 mg, Oral, Q12H, FRANK Moreno, 25 mg at 03/08/18 2010  •  multivitamin (THERAGRAN) tablet 1 tablet, 1 tablet, Oral, Daily, Gal Randall MD, 1 tablet at 03/08/18 2011  •  neomycin-bacitracin-polymyxin (NEOSPORIN) ointment 1 application, 1 application, Topical, Daily, Gal Randall MD, 1 application at 03/08/18 1624  •  pantoprazole (PROTONIX) EC tablet 40 mg, 40 mg, Oral, Daily, FRANK Moreno, 40 mg at  03/08/18 0856  •  potassium chloride (MICRO-K) CR capsule 40 mEq, 40 mEq, Oral, PRN, Epifanio May MD, 40 mEq at 03/08/18 0317  •  sodium chloride 0.9 % flush 1-10 mL, 1-10 mL, Intravenous, PRN, FRANK Moreno  •  thiamine (VITAMIN B-1) tablet 100 mg, 100 mg, Oral, Daily, Gal Randall MD, 100 mg at 03/08/18 2010  Review of Systems:    The following systems were reviewed and negative;  constitution and gastrointestinal    Objective     Vital Signs  Temp:  [97.3 °F (36.3 °C)-97.8 °F (36.6 °C)] 97.7 °F (36.5 °C)  Heart Rate:  [60-70] 65  Resp:  [14-18] 18  BP: ()/(66-88) 91/66  Body mass index is 30.68 kg/(m^2).    Intake/Output Summary (Last 24 hours) at 03/09/18 0722  Last data filed at 03/09/18 0612   Gross per 24 hour   Intake             1290 ml   Output              450 ml   Net              840 ml           Physical Exam:   General: patient awake, alert and cooperative   Eyes: Normal lids and lashes, no scleral icterus   Neck: supple, normal ROM   Skin: warm and dry, not jaundiced   Cardiovascular: regular rhythm and rate, no murmurs auscultated   Pulm: clear to auscultation bilaterally, regular and unlabored   Abdomen: distended with + fluid wave, no rebound/guarding   Rectal: deferred   Extremities: no rash or edema   Psychiatric: Normal mood and behavior; memory intact     Results Review:     I reviewed the patient's new clinical results.  I reviewed the patient's new imaging results and agree with the interpretation.      Results from last 7 days  Lab Units 03/09/18  0412 03/07/18  1338   WBC 10*3/mm3 4.77 3.90*   HEMOGLOBIN g/dL 11.0* 11.2*   HEMATOCRIT % 34.4* 34.4*   PLATELETS 10*3/mm3 131* 114*       Results from last 7 days  Lab Units 03/09/18  0412 03/08/18  2034 03/08/18  0601  03/07/18  1338   SODIUM mmol/L 139  --  141  --  141   POTASSIUM mmol/L 3.7 4.0 4.2  < > 2.8*   CHLORIDE mmol/L 92*  --  96*  --  93*   CO2 mmol/L 29.0  --  32.5*  --  34.0*   BUN mg/dL 32*  --  27*  --  25*    CREATININE mg/dL 1.78*  --  1.38*  --  1.35*   CALCIUM mg/dL 8.2*  --  8.2*  --  8.7   BILIRUBIN mg/dL 1.3*  --  1.4*  --  1.5*   ALK PHOS U/L 99  --  91  --  98   ALT (SGPT) U/L 14  --  8  --  8   AST (SGOT) U/L 31  --  20  --  19   GLUCOSE mg/dL 100*  --  93  --  90   < > = values in this interval not displayed.    Results from last 7 days  Lab Units 03/07/18  1639   INR  1.31*     No results found for: LIPASE      Assessment/Plan   Assessment:   1. Cirrhosis: most likely alcoholic but could also be cardiogenic, nephrogenic  2. Ascites: large volume on CT imaging  3. Alcohol abuse: ongoing  4. CHF: followed by cardiology  5. A fib: on eliquis   6. CKD: stage III      Plan:   -Paracentesis today for ascitic fluid analysis  -2gm Na diet  -needs complete alcohol cessation  -will need EGD for variceal surveillance as an outpt    I discussed the patients findings and my recommendations with patient.         Moustapha Orozco M.D.  [facility]  [address] [place] [address]  Office: [phone]

## 2018-03-09 NOTE — PLAN OF CARE
Problem: Patient Care Overview (Adult)  Goal: Plan of Care Review  Outcome: Ongoing (interventions implemented as appropriate)   03/09/18 1221   Coping/Psychosocial Response Interventions   Plan Of Care Reviewed With patient   Outcome Evaluation   Outcome Summary/Follow up Plan Pt presents to PT with multiple medical problems (ETOH abuse, heart failure, kidney disease, prostate CA) which are impacting pt's mobility. Pt reports he lives in a bilevel home with his wife, and must ambulate up/down flight of steps safely at home. Pt was not safe to ambulate without an assist device today due to unsteady gait and poor tolerance to functional tasks. He presented with impaired transfers and gait, impaired B LE strength, impaired tolerance to functional tasks, impaired balance. Pt would benefit from skilled PT to address these deficits to improve quality of life upon return to home and to lessen pt's risk of falls at home.

## 2018-03-10 ENCOUNTER — APPOINTMENT (OUTPATIENT)
Dept: NUCLEAR MEDICINE | Facility: HOSPITAL | Age: 68
End: 2018-03-10

## 2018-03-10 LAB
AFP-TM SERPL-MCNC: 3.6 NG/ML (ref 0–8.3)
ANION GAP SERPL CALCULATED.3IONS-SCNC: 16.4 MMOL/L
BASOPHILS # BLD AUTO: 0.02 10*3/MM3 (ref 0–0.2)
BASOPHILS NFR BLD AUTO: 0.5 % (ref 0–1.5)
BH CV NUCLEAR PRIOR STUDY: 3
BH CV STRESS COMMENTS STAGE 1: NORMAL
BH CV STRESS DOSE REGADENOSON STAGE 1: 0.4
BH CV STRESS DURATION MIN STAGE 1: 0
BH CV STRESS DURATION SEC STAGE 1: 10
BH CV STRESS PROTOCOL 1: NORMAL
BH CV STRESS RECOVERY BP: NORMAL MMHG
BH CV STRESS RECOVERY HR: 60 BPM
BH CV STRESS STAGE 1: 1
BUN BLD-MCNC: 36 MG/DL (ref 8–23)
BUN/CREAT SERPL: 16.5 (ref 7–25)
CALCIUM SPEC-SCNC: 7.4 MG/DL (ref 8.6–10.5)
CHLORIDE SERPL-SCNC: 96 MMOL/L (ref 98–107)
CHLORIDE UR-SCNC: 32 MMOL/L
CO2 SERPL-SCNC: 28.6 MMOL/L (ref 22–29)
CREAT BLD-MCNC: 2.18 MG/DL (ref 0.76–1.27)
CREAT UR-MCNC: 125.7 MG/DL
DEPRECATED RDW RBC AUTO: 52.1 FL (ref 37–54)
EOSINOPHIL # BLD AUTO: 0.01 10*3/MM3 (ref 0–0.7)
EOSINOPHIL NFR BLD AUTO: 0.2 % (ref 0.3–6.2)
ERYTHROCYTE [DISTWIDTH] IN BLOOD BY AUTOMATED COUNT: 14.5 % (ref 11.5–14.5)
GFR SERPL CREATININE-BSD FRML MDRD: 30 ML/MIN/1.73
GLUCOSE BLD-MCNC: 111 MG/DL (ref 65–99)
GLUCOSE BLDC GLUCOMTR-MCNC: 120 MG/DL (ref 70–130)
GLUCOSE BLDC GLUCOMTR-MCNC: 131 MG/DL (ref 70–130)
GLUCOSE BLDC GLUCOMTR-MCNC: 140 MG/DL (ref 70–130)
GLUCOSE BLDC GLUCOMTR-MCNC: 91 MG/DL (ref 70–130)
HCT VFR BLD AUTO: 33.3 % (ref 40.4–52.2)
HGB BLD-MCNC: 10.8 G/DL (ref 13.7–17.6)
IMM GRANULOCYTES # BLD: 0 10*3/MM3 (ref 0–0.03)
IMM GRANULOCYTES NFR BLD: 0 % (ref 0–0.5)
LV EF NUC BP: 36 %
LYMPHOCYTES # BLD AUTO: 0.87 10*3/MM3 (ref 0.9–4.8)
LYMPHOCYTES NFR BLD AUTO: 20.8 % (ref 19.6–45.3)
MAGNESIUM SERPL-MCNC: 1.6 MG/DL (ref 1.6–2.4)
MAXIMAL PREDICTED HEART RATE: 151 BPM
MCH RBC QN AUTO: 32 PG (ref 27–32.7)
MCHC RBC AUTO-ENTMCNC: 32.4 G/DL (ref 32.6–36.4)
MCV RBC AUTO: 98.5 FL (ref 79.8–96.2)
MONOCYTES # BLD AUTO: 0.75 10*3/MM3 (ref 0.2–1.2)
MONOCYTES NFR BLD AUTO: 17.9 % (ref 5–12)
NEUTROPHILS # BLD AUTO: 2.53 10*3/MM3 (ref 1.9–8.1)
NEUTROPHILS NFR BLD AUTO: 60.6 % (ref 42.7–76)
PERCENT MAX PREDICTED HR: 45.7 %
PLATELET # BLD AUTO: 118 10*3/MM3 (ref 140–500)
PMV BLD AUTO: 13.2 FL (ref 6–12)
POTASSIUM BLD-SCNC: 3.8 MMOL/L (ref 3.5–5.2)
RBC # BLD AUTO: 3.38 10*6/MM3 (ref 4.6–6)
SODIUM BLD-SCNC: 141 MMOL/L (ref 136–145)
SODIUM UR-SCNC: 39 MMOL/L
STRESS BASELINE BP: NORMAL MMHG
STRESS BASELINE HR: 60 BPM
STRESS PERCENT HR: 54 %
STRESS POST PEAK BP: NORMAL MMHG
STRESS POST PEAK HR: 69 BPM
STRESS TARGET HR: 128 BPM
URATE SERPL-MCNC: 8.9 MG/DL (ref 3.4–7)
WBC NRBC COR # BLD: 4.18 10*3/MM3 (ref 4.5–10.7)

## 2018-03-10 PROCEDURE — 78452 HT MUSCLE IMAGE SPECT MULT: CPT | Performed by: INTERNAL MEDICINE

## 2018-03-10 PROCEDURE — 84300 ASSAY OF URINE SODIUM: CPT | Performed by: INTERNAL MEDICINE

## 2018-03-10 PROCEDURE — 25010000002 REGADENOSON 0.4 MG/5ML SOLUTION: Performed by: INTERNAL MEDICINE

## 2018-03-10 PROCEDURE — 82570 ASSAY OF URINE CREATININE: CPT | Performed by: INTERNAL MEDICINE

## 2018-03-10 PROCEDURE — 93017 CV STRESS TEST TRACING ONLY: CPT

## 2018-03-10 PROCEDURE — 78452 HT MUSCLE IMAGE SPECT MULT: CPT

## 2018-03-10 PROCEDURE — 25010000002 ALBUMIN HUMAN 25% PER 50 ML: Performed by: INTERNAL MEDICINE

## 2018-03-10 PROCEDURE — 0 TECHNETIUM SESTAMIBI: Performed by: INTERNAL MEDICINE

## 2018-03-10 PROCEDURE — 99231 SBSQ HOSP IP/OBS SF/LOW 25: CPT | Performed by: NURSE PRACTITIONER

## 2018-03-10 PROCEDURE — 93016 CV STRESS TEST SUPVJ ONLY: CPT | Performed by: INTERNAL MEDICINE

## 2018-03-10 PROCEDURE — 83735 ASSAY OF MAGNESIUM: CPT | Performed by: NURSE PRACTITIONER

## 2018-03-10 PROCEDURE — 25010000002 MAGNESIUM SULFATE IN D5W 1G/100ML (PREMIX) 1-5 GM/100ML-% SOLUTION: Performed by: INTERNAL MEDICINE

## 2018-03-10 PROCEDURE — 85025 COMPLETE CBC W/AUTO DIFF WBC: CPT | Performed by: HOSPITALIST

## 2018-03-10 PROCEDURE — P9046 ALBUMIN (HUMAN), 25%, 20 ML: HCPCS | Performed by: INTERNAL MEDICINE

## 2018-03-10 PROCEDURE — 82962 GLUCOSE BLOOD TEST: CPT

## 2018-03-10 PROCEDURE — A9500 TC99M SESTAMIBI: HCPCS | Performed by: INTERNAL MEDICINE

## 2018-03-10 PROCEDURE — 84550 ASSAY OF BLOOD/URIC ACID: CPT

## 2018-03-10 PROCEDURE — 94799 UNLISTED PULMONARY SVC/PX: CPT

## 2018-03-10 PROCEDURE — 99232 SBSQ HOSP IP/OBS MODERATE 35: CPT | Performed by: INTERNAL MEDICINE

## 2018-03-10 PROCEDURE — 82436 ASSAY OF URINE CHLORIDE: CPT | Performed by: INTERNAL MEDICINE

## 2018-03-10 PROCEDURE — 80048 BASIC METABOLIC PNL TOTAL CA: CPT | Performed by: NURSE PRACTITIONER

## 2018-03-10 PROCEDURE — 93018 CV STRESS TEST I&R ONLY: CPT | Performed by: INTERNAL MEDICINE

## 2018-03-10 RX ORDER — ALBUMIN (HUMAN) 12.5 G/50ML
50 SOLUTION INTRAVENOUS ONCE
Status: COMPLETED | OUTPATIENT
Start: 2018-03-10 | End: 2018-03-10

## 2018-03-10 RX ORDER — MAGNESIUM SULFATE 1 G/100ML
2 INJECTION INTRAVENOUS ONCE
Status: COMPLETED | OUTPATIENT
Start: 2018-03-10 | End: 2018-03-10

## 2018-03-10 RX ADMIN — IPRATROPIUM BROMIDE AND ALBUTEROL SULFATE 3 ML: .5; 3 SOLUTION RESPIRATORY (INHALATION) at 21:44

## 2018-03-10 RX ADMIN — ALBUMIN HUMAN 50 G: 0.25 SOLUTION INTRAVENOUS at 12:13

## 2018-03-10 RX ADMIN — IPRATROPIUM BROMIDE AND ALBUTEROL SULFATE 3 ML: .5; 3 SOLUTION RESPIRATORY (INHALATION) at 10:30

## 2018-03-10 RX ADMIN — METOPROLOL TARTRATE 25 MG: 25 TABLET ORAL at 21:06

## 2018-03-10 RX ADMIN — MAGNESIUM SULFATE HEPTAHYDRATE 2 G: 1 INJECTION, SOLUTION INTRAVENOUS at 14:14

## 2018-03-10 RX ADMIN — SODIUM CHLORIDE 500 ML: 9 INJECTION, SOLUTION INTRAVENOUS at 10:37

## 2018-03-10 RX ADMIN — BACITRACIN ZINC NEOMYCIN SULFATE POLYMYXIN B SULFATE 1 APPLICATION: 400; 3.5; 5 OINTMENT TOPICAL at 10:38

## 2018-03-10 RX ADMIN — APIXABAN 2.5 MG: 2.5 TABLET, FILM COATED ORAL at 10:40

## 2018-03-10 RX ADMIN — FLUOXETINE HYDROCHLORIDE 20 MG: 20 CAPSULE ORAL at 10:37

## 2018-03-10 RX ADMIN — Medication 100 MG: at 10:37

## 2018-03-10 RX ADMIN — TECHNETIUM TC 99M SESTAMIBI 1 DOSE: 1 INJECTION INTRAVENOUS at 08:45

## 2018-03-10 RX ADMIN — FOLIC ACID 1 MG: 1 TABLET ORAL at 10:38

## 2018-03-10 RX ADMIN — ALLOPURINOL 100 MG: 100 TABLET ORAL at 10:37

## 2018-03-10 RX ADMIN — REGADENOSON 0.4 MG: 0.08 INJECTION, SOLUTION INTRAVENOUS at 08:45

## 2018-03-10 RX ADMIN — Medication 1 TABLET: at 10:38

## 2018-03-10 RX ADMIN — PANTOPRAZOLE SODIUM 40 MG: 40 TABLET, DELAYED RELEASE ORAL at 10:37

## 2018-03-10 RX ADMIN — APIXABAN 2.5 MG: 2.5 TABLET, FILM COATED ORAL at 21:06

## 2018-03-10 RX ADMIN — TECHNETIUM TC 99M SESTAMIBI 1 DOSE: 1 INJECTION INTRAVENOUS at 07:00

## 2018-03-10 RX ADMIN — IPRATROPIUM BROMIDE AND ALBUTEROL SULFATE 3 ML: .5; 3 SOLUTION RESPIRATORY (INHALATION) at 15:31

## 2018-03-10 NOTE — PROGRESS NOTES
"Daily progress note    Chief complaint  Doing better  No specific complaints    History of present illness  68-year-old white male who is well-known to our service from multiple admissions in the past with history of chronic kidney disease stage III hypertension diastolic CHF prostate cancer coronary artery disease depression with atrial fibrillation on Eliquis [facility] with lower extremity swelling and weight gain shortness of breath.  Patient evaluated in ER found to be in decompensated CHF cor pulmonale and anasarca admitted for management.  Patient also continued drink and needs detoxification.  Patient denies any chest pain fever chills cough abdominal pain vomiting diarrhea but he has been nauseated.     REVIEW OF SYSTEMS  Review of Systems   Constitutional: Negative for activity change, appetite change and fever.   HENT: Negative for congestion and sore throat.    Eyes: Negative.    Respiratory: Positive for shortness of breath (on exertion). Negative for cough.    Cardiovascular: Positive for leg swelling (BLE). Negative for chest pain.   Gastrointestinal: Positive for abdominal distention. Negative for abdominal pain, diarrhea and vomiting.   Endocrine: Negative.    Genitourinary: Negative for decreased urine volume and dysuria.   Musculoskeletal: Negative for neck pain.   Skin: Negative for rash and wound.   Allergic/Immunologic: Negative.    Neurological: Negative for weakness, numbness and headaches.   Hematological: Negative.    Psychiatric/Behavioral: Negative.    All other systems reviewed and are negative.     PHYSICAL EXAM  Blood pressure 99/79, pulse 70, temperature 98.7 °F (37.1 °C), temperature source Axillary, resp. rate 24, height 180.3 cm (71\"), weight 99.8 kg (220 lb), SpO2 94 %.    Constitutional: He is oriented to person, place, and time and well-developed, well-nourished, and in no distress.   chronically ill appearring, older than stated age   Head: " Normocephalic and atraumatic.   Eyes: EOM are normal. Pupils are equal, round, and reactive to light. No scleral icterus.   Neck: Normal range of motion. Neck supple.   Cardiovascular: Normal rate and normal heart sounds.  An irregularly irregular rhythm present.   Pulmonary/Chest: Effort normal and breath sounds normal. No respiratory distress.   Crackles in the bases of the lungs   Abdominal: Soft. There is no tenderness. There is no rebound and no guarding.   Ascites severe   Musculoskeletal: Normal range of motion. He exhibits edema (2+ in BLE).   Neurological: He is alert and oriented to person, place, and time. He has normal sensation and normal strength.   Skin: Skin is warm and dry.   Psychiatric: Mood and affect normal.     LAB RESULTS  Lab Results (last 24 hours)     Procedure Component Value Units Date/Time    Chloride, Urine, Random - [[id number]] Collected:  03/10/18 1419    Specimen:  Urine Updated:  03/10/18 1425    Creatinine, Urine, Random - [[id number]] Collected:  03/10/18 1419    Specimen:  Urine Updated:  03/10/18 1425    Sodium, Urine, Random - [[id number]] Collected:  03/10/18 1419    Specimen:  Urine Updated:  03/10/18 1425    POC Glucose Once [[id number]]  (Normal) Collected:  03/10/18 1128    Specimen:  Blood Updated:  03/10/18 1129     Glucose 91 mg/dL     Narrative:       Meter: [id number] : [id number] [place]    POC Glucose Once [[id number]]  (Normal) Collected:  03/10/18 0735    Specimen:  Blood Updated:  03/10/18 0735     Glucose 120 mg/dL     Narrative:       Meter: [id number] : [id number] [place]    Body Fluid Culture - Body Fluid, Peritoneum [[id number]]  (Normal) Collected:  03/09/18 1410    Specimen:  Body Fluid from Peritoneum Updated:  03/10/18 0708     BF Culture No growth     Gram Stain Result Few (2+) WBCs seen      No organisms seen    AFP Tumor Marker [[id number]] Collected:  03/09/18 0412    Specimen:  Blood Updated:  03/10/18 0615     AFP Tumor Marker 3.6  ng/mL      Comment: Roche ECLIA methodology       Narrative:       Performed at:  01 - [facility]  [id number]  : Thony Tovar PhD, Phone:  [phone]    Magnesium [[id number]]  (Normal) Collected:  03/10/18 0356    Specimen:  Blood Updated:  03/10/18 0552     Magnesium 1.6 mg/dL     Basic Metabolic Panel [[id number]]  (Abnormal) Collected:  03/10/18 0356    Specimen:  Blood Updated:  03/10/18 0552     Glucose 111 (H) mg/dL      BUN 36 (H) mg/dL      Creatinine 2.18 (H) mg/dL      Sodium 141 mmol/L      Potassium 3.8 mmol/L      Chloride 96 (L) mmol/L      CO2 28.6 mmol/L      Calcium 7.4 (L) mg/dL      eGFR Non African Amer 30 (L) mL/min/1.73      BUN/Creatinine Ratio 16.5     Anion Gap 16.4 mmol/L     Narrative:       GFR Normal >60  Chronic Kidney Disease <60  Kidney Failure <15    CBC & Differential [[id number]] Collected:  03/10/18 0356    Specimen:  Blood Updated:  03/10/18 0549    Narrative:       The following orders were created for panel order CBC & Differential.  Procedure                               Abnormality         Status                     ---------                               -----------         ------                     Scan Slide[[id number]]                                                                  CBC Auto Differential[[id number]]        Abnormal            Final result                 Please view results for these tests on the individual orders.    CBC Auto Differential [[id number]]  (Abnormal) Collected:  03/10/18 0356    Specimen:  Blood Updated:  03/10/18 0548     WBC 4.18 (L) 10*3/mm3      RBC 3.38 (L) 10*6/mm3      Hemoglobin 10.8 (L) g/dL      Hematocrit 33.3 (L) %      MCV 98.5 (H) fL      MCH 32.0 pg      MCHC 32.4 (L) g/dL      RDW 14.5 %      RDW-SD 52.1 fl      MPV 13.2 (H) fL      Platelets 118 (L) 10*3/mm3      Neutrophil % 60.6 %      Lymphocyte % 20.8 %      Monocyte % 17.9 (H) %      Eosinophil % 0.2 (L) %      Basophil %  0.5 %      Immature Grans % 0.0 %      Neutrophils, Absolute 2.53 10*3/mm3      Lymphocytes, Absolute 0.87 (L) 10*3/mm3      Monocytes, Absolute 0.75 10*3/mm3      Eosinophils, Absolute 0.01 10*3/mm3      Basophils, Absolute 0.02 10*3/mm3      Immature Grans, Absolute 0.00 10*3/mm3     POC Glucose Once [[id number]]  (Normal) Collected:  03/09/18 2033    Specimen:  Blood Updated:  03/09/18 2050     Glucose 114 mg/dL     Narrative:       Meter: [id number] : [id number] Jean Rae RN    POC Glucose Once [[id number]]  (Normal) Collected:  03/09/18 1625    Specimen:  Blood Updated:  03/09/18 1630     Glucose 105 mg/dL     Narrative:       Meter: [id number] : [id number] Bao OCAMPO    Body Fluid Cell Count With Differential - Body Fluid, Peritoneum [[id number]] Collected:  03/09/18 1410    Specimen:  Body Fluid from Peritoneum Updated:  03/09/18 1533    Narrative:       The following orders were created for panel order Body Fluid Cell Count With Differential - Body Fluid, Peritoneum.  Procedure                               Abnormality         Status                     ---------                               -----------         ------                     Body fluid cell count - ...[[id number]]  Abnormal            Final result               Body fluid differential ...[[id number]]                      Final result                 Please view results for these tests on the individual orders.    Body fluid differential - Body Fluid, [[id number]] Collected:  03/09/18 1410    Specimen:  Body Fluid from Peritoneum Updated:  03/09/18 1533     Neutrophils, Fluid 5 %      Lymphocytes, Fluid 29 %      Monocytes, Fluid 50 %      Mononuclear, Fluid 16 %     Body fluid cell count - Body Fluid, [[id number]]  (Abnormal) Collected:  03/09/18 1410    Specimen:  Body Fluid from Peritoneum Updated:  03/09/18 1521     Color, Fluid Yellow     Appearance, Fluid Cloudy (A)     WBC, Fluid 126 /mm3      Comment: Estimated count due to  clots present in specimen.        RBC, Fluid 286 /mm3      Comment: Estimated count due to clots present in specimen.       Albumin, Fluid - Body Fluid, Peritoneum [[id number]] Collected:  03/09/18 1410    Specimen:  Body Fluid from Peritoneum Updated:  03/09/18 1509     Albumin, Fluid 2.20 g/dL     Narrative:       No Reference Ranges Established.    A Serous fluid albumin gradient (serum albumin-fluid) <1.1 g/dL suggests the fluid is an exudate.  Cirrhosis usually results in an ascites fluid albumin gradient >1.1 g/dL.    This test was developed, its performance characteristics determined and judged suitable for clinical purposes by [facility].  It has not been cleared or approved by the FDA.  The laboratory is regulated under CLIA as quilified to perfom high-complexity testing.      Protein, Body Fluid - Body Fluid, Peritoneum [[id number]] Collected:  03/09/18 1410    Specimen:  Body Fluid from Peritoneum Updated:  03/09/18 1509     Protein, Total, Fluid 3.1 g/dL     Narrative:       No Reference Ranges Established.    A serous fluid total fluid (TP) greater than 50 percent of the serum TP suggests the fluid is an exudate.      1. Pleural TP/Serum TP >0.5  2. Pleural LD/Serum LD >0.6  3. Pleural LD >2/3 of the upper limit of normal for serum LDH    This test was developed, it performance characteristics determined and judged suitable for clinical purposes by [facility].  It has not been cleared or approved by the FDA.  The laboratory is regulated under CLIA as qualified to perform high-complexity testing.         Imaging Results (last 24 hours)     Procedure Component Value Units Date/Time     Paracentesis [[id number]] Collected:  03/09/18 1733    Specimen:  Body Fluid Updated:  03/09/18 1906    Narrative:       ULTRASOUND-GUIDED PARACENTESIS     HISTORY: Ascites. Abdominal distention.     FINDINGS: Following sterile prep and local anesthetic, a  special  paracentesis catheter was inserted into the left abdomen by Dr. Faye. Then, 19,600 mL of ascites fluid was then removed with  suction technique. The patient tolerated the procedure well and there  were no immediate complications.     This report was finalized on 3/9/2018 7:03 PM by Dr. Charles Ibanez MD.           EKG:  Rate: 95  afib with IVCD, frequent PVCs,   diffuse nonspecific ST and atT wave changes, difference from. 08 2016 when he was in a narrow complex, otherwise similar..      Current Facility-Administered Medications:   •  acetaminophen (TYLENOL) tablet 650 mg, 650 mg, Oral, Q4H PRN, Zakiya Valverde APRN  •  allopurinol (ZYLOPRIM) tablet 100 mg, 100 mg, Oral, Daily, Gal Randall MD, 100 mg at 03/10/18 1037  •  apixaban (ELIQUIS) tablet 2.5 mg, 2.5 mg, Oral, Q12H, Gal Randall MD, 2.5 mg at 03/10/18 1040  •  FLUoxetine (PROzac) capsule 20 mg, 20 mg, Oral, Daily, FRANK Moreno, 20 mg at 03/10/18 1037  •  folic acid (FOLVITE) tablet 1 mg, 1 mg, Oral, Daily, Gal Randall MD, 1 mg at 03/10/18 1038  •  insulin aspart (novoLOG) injection 0-9 Units, 0-9 Units, Subcutaneous, 4x Daily With Meals & Nightly, Jawed MD William  •  ipratropium-albuterol (DUO-NEB) nebulizer solution 3 mL, 3 mL, Nebulization, Q4H - RT, Gal Randall MD, 3 mL at 03/10/18 1030  •  metoprolol tartrate (LOPRESSOR) tablet 25 mg, 25 mg, Oral, Q12H, FRANK Moreno, 25 mg at 03/09/18 2039  •  multivitamin (THERAGRAN) tablet 1 tablet, 1 tablet, Oral, Daily, Gal Randall MD, 1 tablet at 03/10/18 1038  •  neomycin-bacitracin-polymyxin (NEOSPORIN) ointment 1 application, 1 application, Topical, Daily, Gal Randall MD, 1 application at 03/10/18 1038  •  pantoprazole (PROTONIX) EC tablet 40 mg, 40 mg, Oral, Daily, Zakiya Valverde, APRN, 40 mg at 03/10/18 1037  •  potassium chloride (MICRO-K) CR capsule 40 mEq, 40 mEq, Oral, PRN, Epifanio May MD, 40 mEq at 03/08/18 0317  •  sodium chloride 0.9 % flush 1-10 mL, 1-10 mL,  Intravenous, PRN, Zakiya Valverde, APRN  •  thiamine (VITAMIN B-1) tablet 100 mg, 100 mg, Oral, Daily, Victor Manuel Randall MD, 100 mg at 03/10/18 1037     ASSESSMENT  Decompensated CHF  Anasarca  Volume overload  Cirrhosis with ascites status post paracentesis  Hypertension  Alcohol abuse  Elevated troponin with known coronary artery disease  Prostate cancer  Depression  Paroxysmal atrial fibrillation on ELIQUIS    PLAN  CPM  Diuresis   Continue home medication and adjust the doses  Detox with alcohol withdrawal precautions  Supportive care  Stress ulcer prophylaxis  Cardiology and gastroenterology and nephrology following patient  We'll follow     VICTOR MANUEL RANDALL MD

## 2018-03-10 NOTE — PROGRESS NOTES
"   LOS: 3 days   Patient Care Team:  Moustapha Long MD as PCP - General  Moustapha Long MD as PCP - Family Medicine    Chief Complaint/ Reason for encounter: Acute renal failure/chronic kidney disease  Chief Complaint   Patient presents with   • Edema         Subjective     History of Present Illness    Subjective:  Symptoms:  Stable.  No shortness of breath or chest pain.  (No new complaints  He feels better, shortness of breath improved  Lower extremity edema improved  Status post large volume paracentesis yesterday, almost 20 L of ascitic fluid was removed).    Diet:  Adequate intake.  No nausea.    Activity level: Returning to normal.    Pain:  He reports no pain.          History taken from: Patient and chart    Objective     Vital Signs  Temp:  [97.4 °F (36.3 °C)-98.8 °F (37.1 °C)] 98.1 °F (36.7 °C)  Heart Rate:  [54-68] 68  Resp:  [16-20] 20  BP: ()/(47-83) 99/69       Wt Readings from Last 1 Encounters:   03/09/18 0348 99.8 kg (220 lb)   03/08/18 0446 99.7 kg (219 lb 12.8 oz)   03/07/18 1249 99.8 kg (220 lb)       Objective:  General Appearance:  Comfortable, well-appearing, in no acute distress and not in pain.    Vital signs: (most recent): Blood pressure 99/69, pulse 68, temperature 98.1 °F (36.7 °C), temperature source Oral, resp. rate 20, height 180.3 cm (71\"), weight 99.8 kg (220 lb), SpO2 90 %.  Vital signs are normal.  No fever.    Output: Producing urine.    HEENT: Normal HEENT exam.    Lungs:  Normal effort and normal respiratory rate.  Breath sounds clear to auscultation.  He is not in respiratory distress.  No wheezes.    Heart: Normal rate.  Regular rhythm.  S1 normal.  No murmur.   Abdomen: Abdomen is soft and distended.  There are signs of ascites. (Less distended).  Bowel sounds are normal.   There is no epigastric area or suprapubic area tenderness.  There is no rebound tenderness.   There is no mass.   Extremities: Normal range of motion.  There is dependent edema.  There is no " deformity.    Pulses: Distal pulses are intact.    Neurological: Patient is alert and oriented to person, place and time.    Skin:  Warm and dry.  No rash or cyanosis.             Results Review:    Past Medical History: reviewed and updated  Past Medical History:   Diagnosis Date   • Alcoholism    • Arthritis    • Atrial fibrillation     pt reported   • Cellulitis    • CHF (congestive heart failure)    • Colon polyps 08/21/2006    Colonoscopy w/ snare cautery, polypectomy & biopsy, PATH:  Sigmoid Colon Biopsy: focal vascular congestion & evidence of recent hemorrhage, non-specific.  Recto-Sigmoid Colon Biopsy: Fragments of tubular adenoam w/ mild dysplasia. Dr. Mildred You   • DM2 (diabetes mellitus, type 2) 3/7/2018   • History of coronary angioplasty    • History of MRSA infection 2005    pt reported   • History of staph infection 08/02/2005   • Hypertension    • Infectious viral hepatitis     pt reported   • Prostate cancer 04/28/2010    S/P Radical Prostectomy, Adenocarcinoma, Primary Yissel Grade 3, Secondar Yissel Grade 3. Combined yissel score 6. Tumor involves rt & lt lobes, negative capsular margin, no invasion of seminal vesicles, no identified perineural invastion   • Withdrawal symptoms, alcohol          Allergies:  No Known Allergies    Intake/Output:     Intake/Output Summary (Last 24 hours) at 03/10/18 1055  Last data filed at 03/10/18 1037   Gross per 24 hour   Intake              500 ml   Output            28733 ml   Net           -04287 ml         DATA:  Radiology and Labs:  The following labs independently reviewed by me.  Interval notes, chart personally reviewed by me.   Old records independently reviewed showing stage III chronic kidney disease, baseline creatinine around 1.3  The following radiologic studies independently viewed by me, findings 2-D echocardiogram showing EF 30%  New problems include   Renal failure, volume depletion      Risk/ complexity of medical care/ medical decision  making high given new acute renal failure, risk for the deterioration of status if not treated emergently                Labs:   Recent Results (from the past 24 hour(s))   POC Glucose Once    Collection Time: 03/09/18 12:18 PM   Result Value Ref Range    Glucose 118 70 - 130 mg/dL   Albumin, Fluid - Body Fluid, Peritoneum    Collection Time: 03/09/18  2:10 PM   Result Value Ref Range    Albumin, Fluid 2.20 g/dL   Protein, Body Fluid - Body Fluid, Peritoneum    Collection Time: 03/09/18  2:10 PM   Result Value Ref Range    Protein, Total, Fluid 3.1 g/dL   Body Fluid Culture - Body Fluid, Peritoneum    Collection Time: 03/09/18  2:10 PM   Result Value Ref Range    BF Culture No growth     Gram Stain Result Few (2+) WBCs seen     Gram Stain Result No organisms seen    Body fluid cell count - Body Fluid,    Collection Time: 03/09/18  2:10 PM   Result Value Ref Range    Color, Fluid Yellow     Appearance, Fluid Cloudy (A) Clear    WBC, Fluid 126 /mm3    RBC, Fluid 286 /mm3   Body fluid differential - Body Fluid,    Collection Time: 03/09/18  2:10 PM   Result Value Ref Range    Neutrophils, Fluid 5 %    Lymphocytes, Fluid 29 %    Monocytes, Fluid 50 %    Mononuclear, Fluid 16 %   POC Glucose Once    Collection Time: 03/09/18  4:25 PM   Result Value Ref Range    Glucose 105 70 - 130 mg/dL   POC Glucose Once    Collection Time: 03/09/18  8:33 PM   Result Value Ref Range    Glucose 114 70 - 130 mg/dL   Uric Acid    Collection Time: 03/10/18  3:56 AM   Result Value Ref Range    Uric Acid 8.9 (H) 3.4 - 7.0 mg/dL   Magnesium    Collection Time: 03/10/18  3:56 AM   Result Value Ref Range    Magnesium 1.6 1.6 - 2.4 mg/dL   Basic Metabolic Panel    Collection Time: 03/10/18  3:56 AM   Result Value Ref Range    Glucose 111 (H) 65 - 99 mg/dL    BUN 36 (H) 8 - 23 mg/dL    Creatinine 2.18 (H) 0.76 - 1.27 mg/dL    Sodium 141 136 - 145 mmol/L    Potassium 3.8 3.5 - 5.2 mmol/L    Chloride 96 (L) 98 - 107 mmol/L    CO2 28.6 22.0 - 29.0  mmol/L    Calcium 7.4 (L) 8.6 - 10.5 mg/dL    eGFR Non African Amer 30 (L) >60 mL/min/1.73    BUN/Creatinine Ratio 16.5 7.0 - 25.0    Anion Gap 16.4 mmol/L   CBC Auto Differential    Collection Time: 03/10/18  3:56 AM   Result Value Ref Range    WBC 4.18 (L) 4.50 - 10.70 10*3/mm3    RBC 3.38 (L) 4.60 - 6.00 10*6/mm3    Hemoglobin 10.8 (L) 13.7 - 17.6 g/dL    Hematocrit 33.3 (L) 40.4 - 52.2 %    MCV 98.5 (H) 79.8 - 96.2 fL    MCH 32.0 27.0 - 32.7 pg    MCHC 32.4 (L) 32.6 - 36.4 g/dL    RDW 14.5 11.5 - 14.5 %    RDW-SD 52.1 37.0 - 54.0 fl    MPV 13.2 (H) 6.0 - 12.0 fL    Platelets 118 (L) 140 - 500 10*3/mm3    Neutrophil % 60.6 42.7 - 76.0 %    Lymphocyte % 20.8 19.6 - 45.3 %    Monocyte % 17.9 (H) 5.0 - 12.0 %    Eosinophil % 0.2 (L) 0.3 - 6.2 %    Basophil % 0.5 0.0 - 1.5 %    Immature Grans % 0.0 0.0 - 0.5 %    Neutrophils, Absolute 2.53 1.90 - 8.10 10*3/mm3    Lymphocytes, Absolute 0.87 (L) 0.90 - 4.80 10*3/mm3    Monocytes, Absolute 0.75 0.20 - 1.20 10*3/mm3    Eosinophils, Absolute 0.01 0.00 - 0.70 10*3/mm3    Basophils, Absolute 0.02 0.00 - 0.20 10*3/mm3    Immature Grans, Absolute 0.00 0.00 - 0.03 10*3/mm3   POC Glucose Once    Collection Time: 03/10/18  7:35 AM   Result Value Ref Range    Glucose 120 70 - 130 mg/dL   Stress Test With Myocardial Perfusion One Day    Collection Time: 03/10/18  9:49 AM   Result Value Ref Range    BH CV STRESS PROTOCOL 1 Pharmacologic     Stage 1 1     Duration Min Stage 1 0     Duration Sec Stage 1 10     Stress Dose Regadenoson Stage 1 0.4     Stress Comments Stage 1 10 sec bolus injection     Baseline HR 60 bpm    Baseline /78 mmHg    Peak HR 69 bpm    Percent Max Pred HR 45.70 %    Percent Target HR 54 %    Peak /63 mmHg    Recovery HR 60 bpm    Recovery /78 mmHg    Target HR (85%) 128 bpm    Max. Pred. HR (100%) 151 bpm    Nuclear Prior Study 3     Nuc Stress EF 36 %       Radiology:  Imaging Results (last 24 hours)     Procedure Component Value Units  Date/Time    US Paracentesis [[id number]] Collected:  03/09/18 1733    Specimen:  Body Fluid Updated:  03/09/18 1906    Narrative:       ULTRASOUND-GUIDED PARACENTESIS     HISTORY: Ascites. Abdominal distention.     FINDINGS: Following sterile prep and local anesthetic, a special  paracentesis catheter was inserted into the left abdomen by Dr. Faye. Then, 19,600 mL of ascites fluid was then removed with  suction technique. The patient tolerated the procedure well and there  were no immediate complications.     This report was finalized on 3/9/2018 7:03 PM by Dr. Charles Ibanez MD.                Medications have been reviewed:  Current Facility-Administered Medications   Medication Dose Route Frequency Provider Last Rate Last Dose   • acetaminophen (TYLENOL) tablet 650 mg  650 mg Oral Q4H PRN FRANK Moreno       • albumin human 25 % IV SOLN 50 g  50 g Intravenous Once Joseph Trinidad MD       • allopurinol (ZYLOPRIM) tablet 100 mg  100 mg Oral Daily Gal Randall MD   100 mg at 03/10/18 1037   • apixaban (ELIQUIS) tablet 2.5 mg  2.5 mg Oral Q12H Gal Randall MD   2.5 mg at 03/10/18 1040   • FLUoxetine (PROzac) capsule 20 mg  20 mg Oral Daily FRANK Moreno   20 mg at 03/10/18 1037   • folic acid (FOLVITE) tablet 1 mg  1 mg Oral Daily Gal Randall MD   1 mg at 03/10/18 1038   • insulin aspart (novoLOG) injection 0-9 Units  0-9 Units Subcutaneous 4x Daily With Meals & Nightly Jawed MD William       • ipratropium-albuterol (DUO-NEB) nebulizer solution 3 mL  3 mL Nebulization Q4H - RT Gal Randall MD   3 mL at 03/10/18 1030   • magnesium sulfate in D5W 1g/100mL (PREMIX)  2 g Intravenous Once Joseph Trinidad MD       • metoprolol tartrate (LOPRESSOR) tablet 25 mg  25 mg Oral Q12H FRANK Moreno   25 mg at 03/09/18 2039   • multivitamin (THERAGRAN) tablet 1 tablet  1 tablet Oral Daily Gal Randall MD   1 tablet at 03/10/18 1038   • neomycin-bacitracin-polymyxin (NEOSPORIN) ointment 1 application   1 application Topical Daily Gal Randall MD   1 application at 03/10/18 1038   • pantoprazole (PROTONIX) EC tablet 40 mg  40 mg Oral Daily FRANK Moreno   40 mg at 03/10/18 1037   • potassium chloride (MICRO-K) CR capsule 40 mEq  40 mEq Oral PRN Epifanio May MD   40 mEq at 03/08/18 0317   • sodium chloride 0.9 % bolus 500 mL  500 mL Intravenous Once Joseph Trinidad  mL/hr at 03/10/18 1037 500 mL at 03/10/18 1037   • sodium chloride 0.9 % flush 1-10 mL  1-10 mL Intravenous PRN FRANK Moreno       • thiamine (VITAMIN B-1) tablet 100 mg  100 mg Oral Daily Gal Randall MD   100 mg at 03/10/18 1037       Assessment/Plan     Principal Problem:    CHF exacerbation  Active Problems:    Right heart failure    Ascites of liver    Hypertension    Atrial fibrillation    Alcoholism    History of coronary angioplasty    DM2 (diabetes mellitus, type 2)    Pancytopenia    Chronic liver disease    Elevated troponin    CKD (chronic kidney disease)    Hypomagnesemia    Hypokalemia      Assessment:  (Assessment:   acute renal failure, likely a bit over diuresed, may also have some component of hepatorenal syndrome after paracentesis  Stage III chronic kidney disease, baseline creatinine 1.3  Acute exacerbation of diastolic congestive heart failure, improved with diuresis  Volume overload, improved with diuresis  Chronic hypertension  Alcoholism, discussed cessation of alcohol  Borderline elevated troponin, possibly due to chronic kidney disease  Hypokalemia on admission, improved  Metabolic alkalosis  Hypomagnesemia        Plan:   His renal function is worse again today  Diuretics remain on hold  Will give an additional dose of albumin today after paracentesis  500 cc normal saline bolus ×1  Check urine electrolytes to evaluate for possible hepatorenal syndrome after paracentesis  Monitor and replace electrolytes as needed  Discussed alcohol avoidance, possible rehabilitation after discharge).              Continue to monitor renal function, electroytes and volume closely   Please call me with any questions or concerns      Joseph Trinidad MD   [facility]   [phone]    03/10/18  10:55 AM      Dictation performed using Dragon dictation software

## 2018-03-10 NOTE — PROGRESS NOTES
[facility]  Inpatient Progress Note    Reason for Follow Up:  Cirrhosis    Subjective     Interval History:   That is post paracentesis, renal function worse giving extra albumin today.  Nephrology following.  No confusion, memory loss or GI bleed    Current Facility-Administered Medications:   •  acetaminophen (TYLENOL) tablet 650 mg, 650 mg, Oral, Q4H PRN, Zakiya Valverde, APRN  •  allopurinol (ZYLOPRIM) tablet 100 mg, 100 mg, Oral, Daily, Gal Randall MD, 100 mg at 03/10/18 1037  •  apixaban (ELIQUIS) tablet 2.5 mg, 2.5 mg, Oral, Q12H, Gal Randall MD, 2.5 mg at 03/10/18 1040  •  FLUoxetine (PROzac) capsule 20 mg, 20 mg, Oral, Daily, Zakiya Valverde APRN, 20 mg at 03/10/18 1037  •  folic acid (FOLVITE) tablet 1 mg, 1 mg, Oral, Daily, Gal Randall MD, 1 mg at 03/10/18 1038  •  insulin aspart (novoLOG) injection 0-9 Units, 0-9 Units, Subcutaneous, 4x Daily With Meals & Nightly, Soniya Thomas MD  •  ipratropium-albuterol (DUO-NEB) nebulizer solution 3 mL, 3 mL, Nebulization, Q4H - RT, Gal Randall MD, 3 mL at 03/10/18 1030  •  metoprolol tartrate (LOPRESSOR) tablet 25 mg, 25 mg, Oral, Q12H, Zakiya Valverde APRN, 25 mg at 03/09/18 2039  •  multivitamin (THERAGRAN) tablet 1 tablet, 1 tablet, Oral, Daily, Gal Randall MD, 1 tablet at 03/10/18 1038  •  neomycin-bacitracin-polymyxin (NEOSPORIN) ointment 1 application, 1 application, Topical, Daily, Gal Randall MD, 1 application at 03/10/18 1038  •  pantoprazole (PROTONIX) EC tablet 40 mg, 40 mg, Oral, Daily, Zakiya Valverde APRN, 40 mg at 03/10/18 1037  •  potassium chloride (MICRO-K) CR capsule 40 mEq, 40 mEq, Oral, PRN, Epifanio May MD, 40 mEq at 03/08/18 0317  •  sodium chloride 0.9 % flush 1-10 mL, 1-10 mL, Intravenous, PRN, FRANK Moreno  •  thiamine (VITAMIN B-1) tablet 100 mg, 100 mg, Oral, Daily, Gal Randall MD, 100 mg at 03/10/18 1037  Review of Systems:    He endorses lower extremity edema, weakness all her systems reviewed and  negative    Objective     Vital Signs  Temp:  [98.1 °F (36.7 °C)-98.8 °F (37.1 °C)] 98.7 °F (37.1 °C)  Heart Rate:  [54-70] 70  Resp:  [16-24] 24  BP: ()/(47-79) 99/79  Body mass index is 30.68 kg/m².    Intake/Output Summary (Last 24 hours) at 03/10/18 1443  Last data filed at 03/10/18 1344   Gross per 24 hour   Intake              740 ml   Output            48575 ml   Net           -31444 ml     I/O this shift:  In: 740 [P.O.:240; IV Piggyback:500]  Out: -      Physical Exam:   General: patient awake, alert and cooperative   Eyes: Normal lids and lashes, no scleral icterus   Neck: supple, normal ROM   Skin: warm and dry, not jaundiced   Cardiovascular: regular rhythm and rate, no murmurs auscultated   Pulm: clear to auscultation bilaterally, regular and unlabored   Abdomen: soft, nontender, nondistended; normal bowel sounds   Rectal: deferred   Extremities: no rash pos edema   Psychiatric: Normal mood and behavior; memory intact     Results Review:     I reviewed the patient's new clinical results.      Results from last 7 days  Lab Units 03/10/18  0356 03/09/18 0412 03/07/18  1338   WBC 10*3/mm3 4.18* 4.77 3.90*   HEMOGLOBIN g/dL 10.8* 11.0* 11.2*   HEMATOCRIT % 33.3* 34.4* 34.4*   PLATELETS 10*3/mm3 118* 131* 114*       Results from last 7 days  Lab Units 03/10/18  0356 03/09/18  0412 03/08/18  2034 03/08/18  0601  03/07/18  1338   SODIUM mmol/L 141 139  --  141  --  141   POTASSIUM mmol/L 3.8 3.7 4.0 4.2  < > 2.8*   CHLORIDE mmol/L 96* 92*  --  96*  --  93*   CO2 mmol/L 28.6 29.0  --  32.5*  --  34.0*   BUN mg/dL 36* 32*  --  27*  --  25*   CREATININE mg/dL 2.18* 1.78*  --  1.38*  --  1.35*   CALCIUM mg/dL 7.4* 8.2*  --  8.2*  --  8.7   BILIRUBIN mg/dL  --  1.3*  --  1.4*  --  1.5*   ALK PHOS U/L  --  99  --  91  --  98   ALT (SGPT) U/L  --  14  --  8  --  8   AST (SGOT) U/L  --  31  --  20  --  19   GLUCOSE mg/dL 111* 100*  --  93  --  90   < > = values in this interval not displayed.    Results from  last 7 days  Lab Units 03/09/18  0703 03/07/18  1639   INR  1.86* 1.31*     No results found for: LIPASE    Radiology:  US Paracentesis   Final Result      XR Chest 2 View   Final Result   Borderline cardiomegaly and small left pleural effusion.       This report was finalized on 3/8/2018 7:56 AM by Dr. Zachery Ngo MD.          CT Abdomen Pelvis Without Contrast   Final Result   1. Small left pleural effusion.   2. Large amount of ascites.   3. Liver appears somewhat small. Please correlate for cirrhosis.       Radiation dose reduction techniques were utilized, including automated   exposure control and exposure modulation based on body size.       This report was finalized on 3/8/2018 7:56 AM by Dr. Zachery Ngo MD.              Assessment/Plan     Patient Active Problem List   Diagnosis   • CHF exacerbation   • Right heart failure   • Ascites of liver   • Hypertension   • Atrial fibrillation   • Alcoholism   • History of coronary angioplasty   • DM2 (diabetes mellitus, type 2)   • Pancytopenia   • Chronic liver disease   • Elevated troponin   • CKD (chronic kidney disease)   • Hypomagnesemia   • Hypokalemia          Assessment:   1. Cirrhosis: most likely alcoholic but could also be cardiogenic, nephrogenic, Especially with a total protein of greater than 3.0 with ascites analysis  2. Ascites: large volume on CT imaging, status post paracentesis, albumin infusing  3. Alcohol abuse: ongoing  4. CHF: followed by cardiology  5. A fib: on eliquis   6. CKD: stage III, followed by nephrology        Plan:   -Paracentesis  Was performed, no evidence of SBP, culture negative to date, albumin replacement, numbers consistent with cirrhosis and cardiogenic ascites  -2gm Na diet  -needs complete alcohol cessation  -will need EGD for variceal surveillance as an outpt  - Volume management per renal  - We will follow      I discussed the patients findings and my recommendations with patient and nursing  staff.    Moustapha Garcia MD

## 2018-03-10 NOTE — NURSING NOTE
Talked with pt about alcohol treatment. He states he has been to the [facility] twice and wants to look elsewhere. I talked with him about 30 day programs and he seems interested in [facility] in [place], [place] once he has recovered medically. Pt states he will talk to his brother about it. Pt's brother in recovery for 30 yrs and very supportive.

## 2018-03-10 NOTE — PLAN OF CARE
Problem: Fall Risk (Adult)  Goal: Identify Related Risk Factors and Signs and Symptoms  Outcome: Ongoing (interventions implemented as appropriate)    Goal: Absence of Fall  Outcome: Ongoing (interventions implemented as appropriate)    Goal: Identify Related Risk Factors and Signs and Symptoms  Outcome: Ongoing (interventions implemented as appropriate)    Goal: Absence of Fall  Outcome: Ongoing (interventions implemented as appropriate)      Problem: Cardiac: Heart Failure (Adult)  Goal: Signs and Symptoms of Listed Potential Problems Will be Absent, Minimized or Managed (Cardiac: Heart Failure)  Outcome: Ongoing (interventions implemented as appropriate)      Problem: Alcohol Withdrawal Acute, Risk/Actual (Adult)  Goal: Signs and Symptoms of Listed Potential Problems Will be Absent, Minimized or Managed (Alcohol Withdrawal Acute, Risk/Actual)  Outcome: Ongoing (interventions implemented as appropriate)      Problem: Skin Injury Risk (Adult)  Goal: Identify Related Risk Factors and Signs and Symptoms  Outcome: Ongoing (interventions implemented as appropriate)    Goal: Skin Health and Integrity  Outcome: Ongoing (interventions implemented as appropriate)

## 2018-03-10 NOTE — PLAN OF CARE
Problem: Patient Care Overview  Goal: Plan of Care Review  Outcome: Ongoing (interventions implemented as appropriate)   03/10/18 1312   Coping/Psychosocial   Plan of Care Reviewed With patient   Plan of Care Review   Progress improving   OTHER   Outcome Summary patient denies any complaints today. ST this AM. tolerated well. nephro ordered IV bolus and albumin as well as magnesium. patient tolerating this meds well. will continue to monitor.

## 2018-03-11 LAB
ANION GAP SERPL CALCULATED.3IONS-SCNC: 14.4 MMOL/L
BASOPHILS # BLD AUTO: 0.01 10*3/MM3 (ref 0–0.2)
BASOPHILS NFR BLD AUTO: 0.3 % (ref 0–1.5)
BUN BLD-MCNC: 21 MG/DL (ref 8–23)
BUN/CREAT SERPL: 8.5 (ref 7–25)
CALCIUM SPEC-SCNC: 6.9 MG/DL (ref 8.6–10.5)
CHLORIDE SERPL-SCNC: 97 MMOL/L (ref 98–107)
CO2 SERPL-SCNC: 29.6 MMOL/L (ref 22–29)
CREAT BLD-MCNC: 2.48 MG/DL (ref 0.76–1.27)
DEPRECATED RDW RBC AUTO: 51 FL (ref 37–54)
EOSINOPHIL # BLD AUTO: 0.02 10*3/MM3 (ref 0–0.7)
EOSINOPHIL NFR BLD AUTO: 0.5 % (ref 0.3–6.2)
ERYTHROCYTE [DISTWIDTH] IN BLOOD BY AUTOMATED COUNT: 14.4 % (ref 11.5–14.5)
GFR SERPL CREATININE-BSD FRML MDRD: 26 ML/MIN/1.73
GLUCOSE BLD-MCNC: 120 MG/DL (ref 65–99)
GLUCOSE BLDC GLUCOMTR-MCNC: 107 MG/DL (ref 70–130)
GLUCOSE BLDC GLUCOMTR-MCNC: 111 MG/DL (ref 70–130)
GLUCOSE BLDC GLUCOMTR-MCNC: 114 MG/DL (ref 70–130)
GLUCOSE BLDC GLUCOMTR-MCNC: 118 MG/DL (ref 70–130)
GLUCOSE BLDC GLUCOMTR-MCNC: 84 MG/DL (ref 70–130)
HCT VFR BLD AUTO: 30.8 % (ref 40.4–52.2)
HGB BLD-MCNC: 9.9 G/DL (ref 13.7–17.6)
IMM GRANULOCYTES # BLD: 0 10*3/MM3 (ref 0–0.03)
IMM GRANULOCYTES NFR BLD: 0 % (ref 0–0.5)
LYMPHOCYTES # BLD AUTO: 0.51 10*3/MM3 (ref 0.9–4.8)
LYMPHOCYTES NFR BLD AUTO: 12.9 % (ref 19.6–45.3)
MAGNESIUM SERPL-MCNC: 1.8 MG/DL (ref 1.6–2.4)
MCH RBC QN AUTO: 31.2 PG (ref 27–32.7)
MCHC RBC AUTO-ENTMCNC: 32.1 G/DL (ref 32.6–36.4)
MCV RBC AUTO: 97.2 FL (ref 79.8–96.2)
MONOCYTES # BLD AUTO: 0.66 10*3/MM3 (ref 0.2–1.2)
MONOCYTES NFR BLD AUTO: 16.7 % (ref 5–12)
NEUTROPHILS # BLD AUTO: 2.75 10*3/MM3 (ref 1.9–8.1)
NEUTROPHILS NFR BLD AUTO: 69.6 % (ref 42.7–76)
NRBC BLD MANUAL-RTO: 0 /100 WBC (ref 0–0)
PLATELET # BLD AUTO: 100 10*3/MM3 (ref 140–500)
PMV BLD AUTO: 12.6 FL (ref 6–12)
POTASSIUM BLD-SCNC: 3.5 MMOL/L (ref 3.5–5.2)
RBC # BLD AUTO: 3.17 10*6/MM3 (ref 4.6–6)
SODIUM BLD-SCNC: 141 MMOL/L (ref 136–145)
WBC NRBC COR # BLD: 3.95 10*3/MM3 (ref 4.5–10.7)

## 2018-03-11 PROCEDURE — 85025 COMPLETE CBC W/AUTO DIFF WBC: CPT | Performed by: HOSPITALIST

## 2018-03-11 PROCEDURE — 82962 GLUCOSE BLOOD TEST: CPT

## 2018-03-11 PROCEDURE — 99223 1ST HOSP IP/OBS HIGH 75: CPT | Performed by: INTERNAL MEDICINE

## 2018-03-11 PROCEDURE — 25010000002 CALCIUM GLUCONATE PER 10 ML: Performed by: INTERNAL MEDICINE

## 2018-03-11 PROCEDURE — 80048 BASIC METABOLIC PNL TOTAL CA: CPT | Performed by: NURSE PRACTITIONER

## 2018-03-11 PROCEDURE — 25010000002 MAGNESIUM SULFATE IN D5W 1G/100ML (PREMIX) 1-5 GM/100ML-% SOLUTION: Performed by: INTERNAL MEDICINE

## 2018-03-11 PROCEDURE — 99232 SBSQ HOSP IP/OBS MODERATE 35: CPT | Performed by: INTERNAL MEDICINE

## 2018-03-11 PROCEDURE — 94799 UNLISTED PULMONARY SVC/PX: CPT

## 2018-03-11 PROCEDURE — 83735 ASSAY OF MAGNESIUM: CPT | Performed by: NURSE PRACTITIONER

## 2018-03-11 PROCEDURE — 25010000002 ALBUMIN HUMAN 25% PER 50 ML: Performed by: INTERNAL MEDICINE

## 2018-03-11 PROCEDURE — P9046 ALBUMIN (HUMAN), 25%, 20 ML: HCPCS | Performed by: INTERNAL MEDICINE

## 2018-03-11 PROCEDURE — 99232 SBSQ HOSP IP/OBS MODERATE 35: CPT | Performed by: NURSE PRACTITIONER

## 2018-03-11 RX ORDER — POTASSIUM CHLORIDE 750 MG/1
40 CAPSULE, EXTENDED RELEASE ORAL ONCE
Status: COMPLETED | OUTPATIENT
Start: 2018-03-11 | End: 2018-03-11

## 2018-03-11 RX ORDER — CALCIUM GLUCONATE 94 MG/ML
2 INJECTION, SOLUTION INTRAVENOUS EVERY 4 HOURS
Status: DISCONTINUED | OUTPATIENT
Start: 2018-03-11 | End: 2018-03-11

## 2018-03-11 RX ORDER — MAGNESIUM SULFATE 1 G/100ML
2 INJECTION INTRAVENOUS ONCE
Status: COMPLETED | OUTPATIENT
Start: 2018-03-11 | End: 2018-03-11

## 2018-03-11 RX ORDER — ALBUMIN (HUMAN) 12.5 G/50ML
50 SOLUTION INTRAVENOUS ONCE
Status: COMPLETED | OUTPATIENT
Start: 2018-03-11 | End: 2018-03-11

## 2018-03-11 RX ADMIN — APIXABAN 2.5 MG: 2.5 TABLET, FILM COATED ORAL at 08:06

## 2018-03-11 RX ADMIN — CALCIUM GLUCONATE 2 G: 98 INJECTION, SOLUTION INTRAVENOUS at 14:22

## 2018-03-11 RX ADMIN — IPRATROPIUM BROMIDE AND ALBUTEROL SULFATE 3 ML: .5; 3 SOLUTION RESPIRATORY (INHALATION) at 10:47

## 2018-03-11 RX ADMIN — Medication 100 MG: at 08:06

## 2018-03-11 RX ADMIN — CALCIUM GLUCONATE 2 G: 98 INJECTION, SOLUTION INTRAVENOUS at 21:57

## 2018-03-11 RX ADMIN — MAGNESIUM SULFATE HEPTAHYDRATE 2 G: 1 INJECTION, SOLUTION INTRAVENOUS at 12:20

## 2018-03-11 RX ADMIN — METOPROLOL TARTRATE 25 MG: 25 TABLET ORAL at 08:06

## 2018-03-11 RX ADMIN — IPRATROPIUM BROMIDE AND ALBUTEROL SULFATE 3 ML: .5; 3 SOLUTION RESPIRATORY (INHALATION) at 15:01

## 2018-03-11 RX ADMIN — FLUOXETINE HYDROCHLORIDE 20 MG: 20 CAPSULE ORAL at 08:06

## 2018-03-11 RX ADMIN — FOLIC ACID 1 MG: 1 TABLET ORAL at 08:06

## 2018-03-11 RX ADMIN — IPRATROPIUM BROMIDE AND ALBUTEROL SULFATE 3 ML: .5; 3 SOLUTION RESPIRATORY (INHALATION) at 19:20

## 2018-03-11 RX ADMIN — ALBUMIN HUMAN 50 G: 0.25 SOLUTION INTRAVENOUS at 11:02

## 2018-03-11 RX ADMIN — Medication 1 TABLET: at 08:06

## 2018-03-11 RX ADMIN — METOPROLOL TARTRATE 25 MG: 25 TABLET ORAL at 20:19

## 2018-03-11 RX ADMIN — POTASSIUM CHLORIDE 40 MEQ: 750 CAPSULE, EXTENDED RELEASE ORAL at 11:02

## 2018-03-11 RX ADMIN — PANTOPRAZOLE SODIUM 40 MG: 40 TABLET, DELAYED RELEASE ORAL at 08:08

## 2018-03-11 RX ADMIN — CALCIUM GLUCONATE 2 G: 98 INJECTION, SOLUTION INTRAVENOUS at 18:21

## 2018-03-11 RX ADMIN — IPRATROPIUM BROMIDE AND ALBUTEROL SULFATE 3 ML: .5; 3 SOLUTION RESPIRATORY (INHALATION) at 06:52

## 2018-03-11 RX ADMIN — APIXABAN 2.5 MG: 2.5 TABLET, FILM COATED ORAL at 20:19

## 2018-03-11 RX ADMIN — BACITRACIN ZINC NEOMYCIN SULFATE POLYMYXIN B SULFATE 1 APPLICATION: 400; 3.5; 5 OINTMENT TOPICAL at 08:08

## 2018-03-11 RX ADMIN — ALLOPURINOL 100 MG: 100 TABLET ORAL at 08:06

## 2018-03-11 NOTE — PROGRESS NOTES
"Daily progress note    Chief complaint  Doing same  No specific complaints    History of present illness  68-year-old white male who is well-known to our service from multiple admissions in the past with history of chronic kidney disease stage III hypertension diastolic CHF prostate cancer coronary artery disease depression with atrial fibrillation on Eliquis presented to [facility] emergency room with lower extremity swelling and weight gain shortness of breath.  Patient evaluated in ER found to be in decompensated CHF cor pulmonale and anasarca admitted for management.  Patient also continued drink and needs detoxification.  Patient denies any chest pain fever chills cough abdominal pain vomiting diarrhea but he has been nauseated.     REVIEW OF SYSTEMS  Review of Systems   Constitutional: Negative for activity change, appetite change and fever.   HENT: Negative for congestion and sore throat.    Eyes: Negative.    Respiratory: Positive for shortness of breath (on exertion). Negative for cough.    Cardiovascular: Positive for leg swelling (BLE). Negative for chest pain.   Gastrointestinal: Positive for abdominal distention. Negative for abdominal pain, diarrhea and vomiting.   Endocrine: Negative.    Genitourinary: Negative for decreased urine volume and dysuria.   Musculoskeletal: Negative for neck pain.   Skin: Negative for rash and wound.   Allergic/Immunologic: Negative.    Neurological: Negative for weakness, numbness and headaches.   Hematological: Negative.    Psychiatric/Behavioral: Negative.    All other systems reviewed and are negative.     PHYSICAL EXAM  Blood pressure 101/73, pulse 69, temperature 97.8 °F (36.6 °C), temperature source Oral, resp. rate 20, height 180.3 cm (71\"), weight 84.5 kg (186 lb 4 oz), SpO2 96 %.    Constitutional: He is oriented to person, place, and time and well-developed, well-nourished, and in no distress.   chronically ill appearring, older than stated age   Head: " Normocephalic and atraumatic.   Eyes: EOM are normal. Pupils are equal, round, and reactive to light. No scleral icterus.   Neck: Normal range of motion. Neck supple.   Cardiovascular: Normal rate and normal heart sounds.  An irregularly irregular rhythm present.   Pulmonary/Chest: Effort normal and breath sounds normal. No respiratory distress.   Crackles in the bases of the lungs   Abdominal: Soft. There is no tenderness. There is no rebound and no guarding.   Ascites severe   Musculoskeletal: Normal range of motion. He exhibits edema (2+ in BLE).   Neurological: He is alert and oriented to person, place, and time. He has normal sensation and normal strength.   Skin: Skin is warm and dry.   Psychiatric: Mood and affect normal.     LAB RESULTS  Lab Results (last 24 hours)     Procedure Component Value Units Date/Time    POC Glucose Once [[id number]]  (Normal) Collected:  03/11/18 1155    Specimen:  Blood Updated:  03/11/18 1205     Glucose 114 mg/dL     Narrative:       Meter: [id number] : [id number] Gong Irma NA    POC Glucose Once [[id number]]  (Normal) Collected:  03/11/18 0738    Specimen:  Blood Updated:  03/11/18 0739     Glucose 107 mg/dL     Narrative:       Meter: [id number] : [id number] Gong Irma NA    Body Fluid Culture - Body Fluid, Peritoneum [[id number]]  (Normal) Collected:  03/09/18 1410    Specimen:  Body Fluid from Peritoneum Updated:  03/11/18 0635     BF Culture No growth at 2 days     Gram Stain Result Few (2+) WBCs seen      No organisms seen    Basic Metabolic Panel [[id number]]  (Abnormal) Collected:  03/11/18 0458    Specimen:  Blood Updated:  03/11/18 0612     Glucose 120 (H) mg/dL      BUN 21 mg/dL      Creatinine 2.48 (H) mg/dL      Sodium 141 mmol/L      Potassium 3.5 mmol/L      Chloride 97 (L) mmol/L      CO2 29.6 (H) mmol/L      Calcium 6.9 (L) mg/dL      eGFR Non African Amer 26 (L) mL/min/1.73      BUN/Creatinine Ratio 8.5     Anion Gap 14.4 mmol/L     Narrative:        GFR Normal >60  Chronic Kidney Disease <60  Kidney Failure <15    Magnesium [[id number]]  (Normal) Collected:  03/11/18 0458    Specimen:  Blood Updated:  03/11/18 0611     Magnesium 1.8 mg/dL     CBC & Differential [[id number]] Collected:  03/11/18 0458    Specimen:  Blood Updated:  03/11/18 0606    Narrative:       The following orders were created for panel order CBC & Differential.  Procedure                               Abnormality         Status                     ---------                               -----------         ------                     CBC Auto Differential[[id number]]        Abnormal            Final result                 Please view results for these tests on the individual orders.    CBC Auto Differential [[id number]]  (Abnormal) Collected:  03/11/18 0458    Specimen:  Blood Updated:  03/11/18 0606     WBC 3.95 (L) 10*3/mm3      RBC 3.17 (L) 10*6/mm3      Hemoglobin 9.9 (L) g/dL      Hematocrit 30.8 (L) %      MCV 97.2 (H) fL      MCH 31.2 pg      MCHC 32.1 (L) g/dL      RDW 14.4 %      RDW-SD 51.0 fl      MPV 12.6 (H) fL      Platelets 100 (L) 10*3/mm3      Neutrophil % 69.6 %      Lymphocyte % 12.9 (L) %      Monocyte % 16.7 (H) %      Eosinophil % 0.5 %      Basophil % 0.3 %      Immature Grans % 0.0 %      Neutrophils, Absolute 2.75 10*3/mm3      Lymphocytes, Absolute 0.51 (L) 10*3/mm3      Monocytes, Absolute 0.66 10*3/mm3      Eosinophils, Absolute 0.02 10*3/mm3      Basophils, Absolute 0.01 10*3/mm3      Immature Grans, Absolute 0.00 10*3/mm3      nRBC 0.0 /100 WBC     POC Glucose Once [[id number]]  (Abnormal) Collected:  03/1950    Specimen:  Blood Updated:  03/10/18 1950     Glucose 140 (H) mg/dL     Narrative:       Meter: [id number] : [id number] [facility]    POC Glucose Once [[id number]]  (Abnormal) Collected:  03/10/18 1620    Specimen:  Blood Updated:  03/10/18 1623     Glucose 131 (H) mg/dL     Narrative:       Meter: [id number] : [id number] Cristiana Duarte     Chloride, Urine, Random - [[id number]] Collected:  03/10/18 1419    Specimen:  Urine Updated:  03/10/18 1500     Chloride, Urine 32 mmol/L     Narrative:       Reference intervals for random urine have not been established.  Clinical usage is dependent upon physician's interpretation in combination with other laboratory tests.     Sodium, Urine, Random - [[id number]] Collected:  03/10/18 1419    Specimen:  Urine Updated:  03/10/18 1500     Sodium, Urine 39 mmol/L     Narrative:       Reference intervals for random urine have not been established.  Clinical usage is dependent upon physician's interpretation in combination with other laboratory tests.     Creatinine, Urine, Random - [[id number]] Collected:  03/10/18 1419    Specimen:  Urine Updated:  03/10/18 1459     Creatinine, Urine 125.7 mg/dL     Narrative:       Reference intervals for random urine have not been established.  Clinical usage is dependent upon physician's interpretation in combination with other laboratory tests.         Imaging Results (last 24 hours)     ** No results found for the last 24 hours. **        EKG:  Rate: 95  afib with IVCD, frequent PVCs,   diffuse nonspecific ST and atT wave changes, difference from. 08 2016 when he was in a narrow complex, otherwise similar..      Current Facility-Administered Medications:   •  acetaminophen (TYLENOL) tablet 650 mg, 650 mg, Oral, Q4H PRN, FRANK Moreno  •  allopurinol (ZYLOPRIM) tablet 100 mg, 100 mg, Oral, Daily, Gal Randall MD, 100 mg at 03/11/18 0806  •  apixaban (ELIQUIS) tablet 2.5 mg, 2.5 mg, Oral, Q12H, Gal Randall MD, 2.5 mg at 03/11/18 0806  •  calcium gluconate 2 g in sodium chloride 0.9 % 100 mL IVPB, 2 g, Intravenous, Q4H, Joseph Trinidad MD, 2 g at 03/11/18 1422  •  FLUoxetine (PROzac) capsule 20 mg, 20 mg, Oral, Daily, FRANK Moreno, 20 mg at 03/11/18 0806  •  folic acid (FOLVITE) tablet 1 mg, 1 mg, Oral, Daily, Gal Randall MD, 1 mg at 03/11/18 0806  •  insulin  aspart (novoLOG) injection 0-9 Units, 0-9 Units, Subcutaneous, 4x Daily With Meals & Nightly, Jawed MD William  •  ipratropium-albuterol (DUO-NEB) nebulizer solution 3 mL, 3 mL, Nebulization, Q4H - RT, Victor Manuel Randall MD, 3 mL at 03/11/18 1047  •  metoprolol tartrate (LOPRESSOR) tablet 25 mg, 25 mg, Oral, Q12H, FRANK Moreno, 25 mg at 03/11/18 0806  •  multivitamin (THERAGRAN) tablet 1 tablet, 1 tablet, Oral, Daily, Victor Manuel Randall MD, 1 tablet at 03/11/18 0806  •  neomycin-bacitracin-polymyxin (NEOSPORIN) ointment 1 application, 1 application, Topical, Daily, Victor Manuel Randall MD, 1 application at 03/11/18 0808  •  pantoprazole (PROTONIX) EC tablet 40 mg, 40 mg, Oral, Daily, FRANK Moreno, 40 mg at 03/11/18 0808  •  potassium chloride (MICRO-K) CR capsule 40 mEq, 40 mEq, Oral, PRN, Epifanio May MD, 40 mEq at 03/08/18 0317  •  sodium chloride 0.9 % flush 1-10 mL, 1-10 mL, Intravenous, PRN, FRANK Moreno  •  thiamine (VITAMIN B-1) tablet 100 mg, 100 mg, Oral, Daily, Victor Manuel Randall MD, 100 mg at 03/11/18 0806     ASSESSMENT  Decompensated CHF  Anasarca  Volume overload  Cirrhosis with ascites status post paracentesis  Hypertension  Alcohol abuse  Pancytopenia secondary to alcohol abuse  Elevated troponin with known coronary artery disease  Prostate cancer  Depression  Paroxysmal atrial fibrillation on ELIQUIS    PLAN  CPM  Diuresis   Continue home medication and adjust the doses  Detox with alcohol withdrawal precautions  Supportive care  Stress ulcer prophylaxis  Cardiology and gastroenterology and nephrology following patient  We'll follow     VICTOR MANUEL RANDALL MD

## 2018-03-11 NOTE — CONSULTS
[facility] CBC GROUP INITIAL INPATIENT CONSULTATION NOTE    REASON FOR CONSULTATION:  Pancytopenia    HISTORY OF PRESENT ILLNESS:  Anibal Freedman is a 68 y.o. male who we are asked to see today in consultation for pancytopenia.    He has chronic anemia with a hgb between 10-12.  Mild thrombocytopenia has developed during this admission, and his WBC is slightly low today.    He has permanent atrial fibrillation and is on Eliquis 2.5 mg bid.      He's admitted with acute on chronic heart failure with ascites, anasarca, cor pulmonale, and electrolyte disturbances with lethargy, confusion, and abdominal distension.  He had a paracentesis on 3/9 of 19.6 liters.  His abdominal distension is obviously much improved following this.  Clinically he is much better.      He admits to daily EtOH use of a pint to a fifth daily with his last drink about 2-3 weeks ago.  He stats that he is going to quit drinking.  He denies any known prior h/o hematologic disorders.  He denies any fever.  He denies any bleeding.  He denies HIV risk factors.  He denies any family history of hematologic disorders.              Past Medical History:   Diagnosis Date   • Alcoholism    • Arthritis    • Atrial fibrillation     pt reported   • Cellulitis    • CHF (congestive heart failure)    • Colon polyps 08/21/2006    Colonoscopy w/ snare cautery, polypectomy & biopsy, PATH:  Sigmoid Colon Biopsy: focal vascular congestion & evidence of recent hemorrhage, non-specific.  Recto-Sigmoid Colon Biopsy: Fragments of tubular adenoam w/ mild dysplasia. Dr. Mildred You   • DM2 (diabetes mellitus, type 2) 3/7/2018   • History of coronary angioplasty    • History of MRSA infection 2005    pt reported   • History of staph infection 08/02/2005   • Hypertension    • Infectious viral hepatitis     pt reported   • Prostate cancer 04/28/2010    S/P Radical Prostectomy, Adenocarcinoma, Primary Yissel Grade 3, Secondar Yissel Grade 3. Combined yissel score 6.  Tumor involves rt & lt lobes, negative capsular margin, no invasion of seminal vesicles, no identified perineural invastion   • Withdrawal symptoms, alcohol        Past Surgical History:   Procedure Laterality Date   • APPENDECTOMY     • CARDIAC ABLATION Right 01/18/2013    Atrial Flutter Ablation, Dr. Otis Srinivasan   • CARDIAC CATHETERIZATION N/A 8/2/2016    Procedure: Left Heart Cath   ?right cath too;  Surgeon: Epifanio May MD;  Location: [facility] CATH INVASIVE LOCATION;  Service:    • CARDIAC CATHETERIZATION N/A 8/2/2016    Procedure: Coronary angiography;  Surgeon: Epifanio May MD;  Location: [facility] CATH INVASIVE LOCATION;  Service:    • CARDIAC CATHETERIZATION N/A 8/2/2016    Procedure: Left ventriculography;  Surgeon: Epifanio May MD;  Location: [facility] CATH INVASIVE LOCATION;  Service:    • CARDIAC CATHETERIZATION N/A 8/2/2016    Procedure: Right Heart Cath;  Surgeon: Epifanio May MD;  Location: [facility] CATH INVASIVE LOCATION;  Service:    • COLONOSCOPY N/A 3/15/2017    Procedure: COLONOSCOPY TO CECUM WITH COLD BIOPSY POLYECTOMY;  Surgeon: Anibal Bower MD;  Location: [facility] ENDOSCOPY;  Service:    • COLONOSCOPY W/ BIOPSIES AND POLYPECTOMY N/A 08/21/2006    Colonoscopy w/ snare cautery, polypectomy & biopsy, PATH:  Sigmoid Colon Biopsy: focal vascular congestion & evidence of recent hemorrhage, non-specific.  Recto-Sigmoid Colon Biopsy: Fragments of tubular adenoam w/ mild dysplasia. Dr. Mildred You   • CORONARY ANGIOPLASTY WITH STENT PLACEMENT      x 2    • CYSTOSCOPY N/A 04/28/2010    Cystoscopy w/ transurethral incision of the bladder neck, Dr. ANDREW Cordova   • CYSTOTOMY N/A 09/23/2011    Laparoscopic closure of incidental cystotomy, Laparoscopic incision of pelvic lymphocele, Dr. Jaime Royal   • JOINT REPLACEMENT     • LEG DEBRIDEMENT Left 08/19/2005    Left Thigh, Debridement skin & subcutaneous tissue muscle w/ closure via advancement flaps, Dr. Levy  Avelino   • LEG DEBRIDEMENT Left 08/08/2005    Debridement of left anterior thigh, Dr. Mildred You   • PROSTATECTOMY N/A 04/28/2010    Adenocarcinoma, Primary [place] Grade 3, Secondar Yissel Grade 3. Combined yissel score 6. Tumor involves rt & lt lobes, negative capsular margin, no invasion of seminal vesicles, no identified perineural invastion, Dr. Jaime Royal   • SKIN BIOPSY     • TONSILLECTOMY     • TOTAL KNEE ARTHROPLASTY Left 03/25/2008    Left total knee arthroplasty w/ Juan pinless computer navigation, Dr. Ashok Crocker   • WOUND DEBRIDEMENT Left 08/02/2005    Left Buttocks & Left thigh wounds, Debridement of left buttocks & left thigh wounds, Dr. Mildred You       SOCIAL HISTORY:   reports that he quit smoking about 10 years ago. He smoked 1.50 packs per day. He does not have any smokeless tobacco history on file. He reports that he drinks about 3.6 oz of alcohol per week . He reports that he does not use drugs.    FAMILY HISTORY:  family history includes Alcohol abuse in his brother and father; Cancer in his sister; Heart disease in his mother; Hypertension in his brother; Liver cancer in his father; Skin cancer in his brother.    ALLERGIES:  No Known Allergies    MEDICATIONS:  As listed in the electronic medical record.    Review of Systems   Constitutional: Positive for fatigue.   Gastrointestinal: Positive for abdominal distention.   Skin:        Scattered ecchymoses   Hematological: Bruises/bleeds easily.   All other systems reviewed and are negative.      Vitals:    03/11/18 0700 03/11/18 0806 03/11/18 1047 03/11/18 1052   BP: 110/80 110/80     BP Location: Left arm      Patient Position: Sitting      Pulse: 61  69 57   Resp: 18  20 20   Temp:       TempSrc:       SpO2:   96%    Weight:       Height:           Physical Exam   Constitutional: He is oriented to person, place, and time.   Chronically ill appearing   HENT:   Head: Normocephalic and atraumatic.   Eyes: EOM are normal. Pupils  are equal, round, and reactive to light.   Neck: Normal range of motion. Neck supple.   Cardiovascular: Normal rate and normal heart sounds.  An irregular rhythm present.   No murmur heard.  Pulmonary/Chest: Effort normal and breath sounds normal. No respiratory distress. He has no wheezes. He has no rales.   Abdominal: Soft. Bowel sounds are normal. He exhibits distension (mild). He exhibits no mass. There is no tenderness. There is no guarding.   Musculoskeletal: He exhibits edema (trace BLE).   Lymphadenopathy:     He has no cervical adenopathy.     He has no axillary adenopathy.   Neurological: He is alert and oriented to person, place, and time.   Skin: Skin is warm and dry.   Scattered ecchymoses, largest in the L antecubital area   Psychiatric: He has a normal mood and affect. His behavior is normal. Judgment and thought content normal.       DIAGNOSTIC DATA:  WBC   Date Value Ref Range Status   03/11/2018 3.95 (L) 4.50 - 10.70 10*3/mm3 Final     RBC   Date Value Ref Range Status   03/11/2018 3.17 (L) 4.60 - 6.00 10*6/mm3 Final     Hemoglobin   Date Value Ref Range Status   03/11/2018 9.9 (L) 13.7 - 17.6 g/dL Final     Hematocrit   Date Value Ref Range Status   03/11/2018 30.8 (L) 40.4 - 52.2 % Final     MCV   Date Value Ref Range Status   03/11/2018 97.2 (H) 79.8 - 96.2 fL Final     MCH   Date Value Ref Range Status   03/11/2018 31.2 27.0 - 32.7 pg Final     MCHC   Date Value Ref Range Status   03/11/2018 32.1 (L) 32.6 - 36.4 g/dL Final     RDW   Date Value Ref Range Status   03/11/2018 14.4 11.5 - 14.5 % Final     RDW-SD   Date Value Ref Range Status   03/11/2018 51.0 37.0 - 54.0 fl Final     MPV   Date Value Ref Range Status   03/11/2018 12.6 (H) 6.0 - 12.0 fL Final     Platelets   Date Value Ref Range Status   03/11/2018 100 (L) 140 - 500 10*3/mm3 Final     Neutrophil %   Date Value Ref Range Status   03/11/2018 69.6 42.7 - 76.0 % Final     Lymphocyte %   Date Value Ref Range Status   03/11/2018 12.9 (L)  19.6 - 45.3 % Final     Monocyte %   Date Value Ref Range Status   03/11/2018 16.7 (H) 5.0 - 12.0 % Final     Eosinophil %   Date Value Ref Range Status   03/11/2018 0.5 0.3 - 6.2 % Final     Basophil %   Date Value Ref Range Status   03/11/2018 0.3 0.0 - 1.5 % Final     Immature Grans %   Date Value Ref Range Status   03/11/2018 0.0 0.0 - 0.5 % Final     Neutrophils, Absolute   Date Value Ref Range Status   03/11/2018 2.75 1.90 - 8.10 10*3/mm3 Final     Lymphocytes, Absolute   Date Value Ref Range Status   03/11/2018 0.51 (L) 0.90 - 4.80 10*3/mm3 Final     Monocytes, Absolute   Date Value Ref Range Status   03/11/2018 0.66 0.20 - 1.20 10*3/mm3 Final     Eosinophils, Absolute   Date Value Ref Range Status   03/11/2018 0.02 0.00 - 0.70 10*3/mm3 Final     Basophils, Absolute   Date Value Ref Range Status   03/11/2018 0.01 0.00 - 0.20 10*3/mm3 Final     Immature Grans, Absolute   Date Value Ref Range Status   03/11/2018 0.00 0.00 - 0.03 10*3/mm3 Final     nRBC   Date Value Ref Range Status   03/11/2018 0.0 0.0 - 0.0 /100 WBC Final       Glucose   Date Value Ref Range Status   03/11/2018 120 (H) 65 - 99 mg/dL Final     Sodium   Date Value Ref Range Status   03/11/2018 141 136 - 145 mmol/L Final     Potassium   Date Value Ref Range Status   03/11/2018 3.5 3.5 - 5.2 mmol/L Final     CO2   Date Value Ref Range Status   03/11/2018 29.6 (H) 22.0 - 29.0 mmol/L Final     Chloride   Date Value Ref Range Status   03/11/2018 97 (L) 98 - 107 mmol/L Final     Anion Gap   Date Value Ref Range Status   03/11/2018 14.4 mmol/L Final     Creatinine   Date Value Ref Range Status   03/11/2018 2.48 (H) 0.76 - 1.27 mg/dL Final     BUN   Date Value Ref Range Status   03/11/2018 21 8 - 23 mg/dL Final     BUN/Creatinine Ratio   Date Value Ref Range Status   03/11/2018 8.5 7.0 - 25.0 Final     Calcium   Date Value Ref Range Status   03/11/2018 6.9 (L) 8.6 - 10.5 mg/dL Final     eGFR Non  Amer   Date Value Ref Range Status   03/11/2018 26  (L) >60 mL/min/1.73 Final     Alkaline Phosphatase   Date Value Ref Range Status   03/09/2018 99 39 - 117 U/L Final     Total Protein   Date Value Ref Range Status   03/09/2018 5.9 (L) 6.0 - 8.5 g/dL Final     ALT (SGPT)   Date Value Ref Range Status   03/09/2018 14 1 - 41 U/L Final     AST (SGOT)   Date Value Ref Range Status   03/09/2018 31 1 - 40 U/L Final     Total Bilirubin   Date Value Ref Range Status   03/09/2018 1.3 (H) 0.1 - 1.2 mg/dL Final     Albumin   Date Value Ref Range Status   03/09/2018 3.20 (L) 3.50 - 5.20 g/dL Final     Globulin   Date Value Ref Range Status   03/09/2018 2.7 gm/dL Final     A/G Ratio   Date Value Ref Range Status   03/09/2018 1.2 g/dL Final     TSH 4.96    IMAGING:      CT A/P:  From 3/8 images personally reviewed.  Very large amount of ascites.  Small liver, ? cirrhosis      IMPRESSION:  1. Small left pleural effusion.  2. Large amount of ascites.  3. Liver appears somewhat small. Please correlate for cirrhosis.    Assessment/Plan   ASSESSMENT:  This is a 68 y.o. male with:  1.  Pancytopenia:  Blood counts are just mildly low at this point.  The last CBCs we have from here are from one year ago, so cytopenias have likely been evolving since that time.   He has chronic normocytic to macrocytic anemia.  His WBC is just mildly low and he does have a mild lymphocytopenia.  Mild thrombocytopenia is noted.  He does have a history of cirrhosis with ongoing daily alcohol use, and I suspect that this is the etiology of his cytopenias.  He has heart failure with significant ascites and there could be a dilutional component of this.  Medication can be contributing.  Given the above issues and the mild nature of his pancytopenia, our suspicion for a significant hematologic process is low at this time.  He has not had a vitamin B12 level and we will check this today.  He denies any HIV risk factors but given lymphocytopenia we will evaluate for this with antibodies.  I don't think that flow  cytometry is indicated at this time.     2.  CHF  3.  SUNDAY  4.  Ascites s/p paracentesis of 19.6 L on 3/9    RECOMMENDATIONS/PLAN:   1.  I will check a vitamin B12 level and HIV abs  2.  CBC/dif in the AM tomorrow  3.  Will f/u lab results tomorrow    Thank you for allowing us to see this nice gentleman in consultation.  We will see him tomorrow.      Joseph Mckay MD

## 2018-03-11 NOTE — PROGRESS NOTES
[facility]  Inpatient Progress Note    Reason for Follow Up:  Cirrhosis, ascites, chronic kidney disease    Subjective     Interval History:   No abdominal pain, bloating today.  No confusion or memory loss.  No GI bleed    Current Facility-Administered Medications:   •  acetaminophen (TYLENOL) tablet 650 mg, 650 mg, Oral, Q4H PRN, FRANK Moreno  •  allopurinol (ZYLOPRIM) tablet 100 mg, 100 mg, Oral, Daily, Gal Randall MD, 100 mg at 03/11/18 0806  •  apixaban (ELIQUIS) tablet 2.5 mg, 2.5 mg, Oral, Q12H, Gal Randall MD, 2.5 mg at 03/11/18 0806  •  calcium gluconate 2 g in sodium chloride 0.9 % 100 mL IVPB, 2 g, Intravenous, Q4H, Joseph Trinidad MD  •  FLUoxetine (PROzac) capsule 20 mg, 20 mg, Oral, Daily, FRANK Moreno, 20 mg at 03/11/18 0806  •  folic acid (FOLVITE) tablet 1 mg, 1 mg, Oral, Daily, Gal Randall MD, 1 mg at 03/11/18 0806  •  insulin aspart (novoLOG) injection 0-9 Units, 0-9 Units, Subcutaneous, 4x Daily With Meals & Nightly, Jawed MD William  •  ipratropium-albuterol (DUO-NEB) nebulizer solution 3 mL, 3 mL, Nebulization, Q4H - RT, Gal Randall MD, 3 mL at 03/11/18 1047  •  metoprolol tartrate (LOPRESSOR) tablet 25 mg, 25 mg, Oral, Q12H, FRANK Moreno, 25 mg at 03/11/18 0806  •  multivitamin (THERAGRAN) tablet 1 tablet, 1 tablet, Oral, Daily, Gal Randall MD, 1 tablet at 03/11/18 0806  •  neomycin-bacitracin-polymyxin (NEOSPORIN) ointment 1 application, 1 application, Topical, Daily, Gal Randall MD, 1 application at 03/11/18 0808  •  pantoprazole (PROTONIX) EC tablet 40 mg, 40 mg, Oral, Daily, FRANK Moreno, 40 mg at 03/11/18 0808  •  potassium chloride (MICRO-K) CR capsule 40 mEq, 40 mEq, Oral, PRN, Epifanio May MD, 40 mEq at 03/08/18 0317  •  sodium chloride 0.9 % flush 1-10 mL, 1-10 mL, Intravenous, PRN, Zakiya Valverde APRN  •  thiamine (VITAMIN B-1) tablet 100 mg, 100 mg, Oral, Daily, Gal Randall MD, 100 mg at 03/11/18 0806  Review of  Systems:    Endorses weakness, edema in the legs, all other systems reviewed and negative    Objective     Vital Signs  Temp:  [97.6 °F (36.4 °C)-98.4 °F (36.9 °C)] 97.8 °F (36.6 °C)  Heart Rate:  [57-76] 69  Resp:  [18-20] 20  BP: ()/(60-80) 101/73  Body mass index is 25.98 kg/m².    Intake/Output Summary (Last 24 hours) at 03/11/18 1419  Last data filed at 03/11/18 1157   Gross per 24 hour   Intake              838 ml   Output               50 ml   Net              788 ml     I/O this shift:  In: 598 [P.O.:598]  Out: -      Physical Exam:   General: patient awake, alert and cooperative   Eyes: Normal lids and lashes, no scleral icterus   Neck: supple, normal ROM   Skin: warm and dry, not jaundiced   Cardiovascular: regular rhythm and rate, no murmurs auscultated   Pulm: clear to auscultation bilaterally, regular and unlabored   Abdomen: soft, nontender, nondistended; normal bowel sounds   Rectal: deferred   Extremities: no rash, Positive edema   Psychiatric: Normal mood and behavior; memory intact     Results Review:     I reviewed the patient's new clinical results.      Results from last 7 days  Lab Units 03/11/18  0458 03/10/18  0356 03/09/18  0412   WBC 10*3/mm3 3.95* 4.18* 4.77   HEMOGLOBIN g/dL 9.9* 10.8* 11.0*   HEMATOCRIT % 30.8* 33.3* 34.4*   PLATELETS 10*3/mm3 100* 118* 131*       Results from last 7 days  Lab Units 03/11/18  0458 03/10/18  0356 03/09/18  0412  03/08/18  0601  03/07/18  1338   SODIUM mmol/L 141 141 139  --  141  --  141   POTASSIUM mmol/L 3.5 3.8 3.7  < > 4.2  < > 2.8*   CHLORIDE mmol/L 97* 96* 92*  --  96*  --  93*   CO2 mmol/L 29.6* 28.6 29.0  --  32.5*  --  34.0*   BUN mg/dL 21 36* 32*  --  27*  --  25*   CREATININE mg/dL 2.48* 2.18* 1.78*  --  1.38*  --  1.35*   CALCIUM mg/dL 6.9* 7.4* 8.2*  --  8.2*  --  8.7   BILIRUBIN mg/dL  --   --  1.3*  --  1.4*  --  1.5*   ALK PHOS U/L  --   --  99  --  91  --  98   ALT (SGPT) U/L  --   --  14  --  8  --  8   AST (SGOT) U/L  --   --  31   --  20  --  19   GLUCOSE mg/dL 120* 111* 100*  --  93  --  90   < > = values in this interval not displayed.    Results from last 7 days  Lab Units 03/09/18  0703 03/07/18  1639   INR  1.86* 1.31*     No results found for: LIPASE    Radiology:  US Paracentesis   Final Result      XR Chest 2 View   Final Result   Borderline cardiomegaly and small left pleural effusion.       This report was finalized on 3/8/2018 7:56 AM by Dr. Zachery Ngo MD.          CT Abdomen Pelvis Without Contrast   Final Result   1. Small left pleural effusion.   2. Large amount of ascites.   3. Liver appears somewhat small. Please correlate for cirrhosis.       Radiation dose reduction techniques were utilized, including automated   exposure control and exposure modulation based on body size.       This report was finalized on 3/8/2018 7:56 AM by Dr. Zachery Ngo MD.              Assessment/Plan     Patient Active Problem List   Diagnosis   • CHF exacerbation   • Right heart failure   • Ascites of liver   • Hypertension   • Atrial fibrillation   • Alcoholism   • History of coronary angioplasty   • DM2 (diabetes mellitus, type 2)   • Pancytopenia   • Chronic liver disease   • Elevated troponin   • CKD (chronic kidney disease)   • Hypomagnesemia   • Hypokalemia       Assessment:   1. Cirrhosis: most likely alcoholic,  but ascites could also be cardiogenic, nephrogenic, Especially with a total protein of greater than 3.0 with ascites analysis  2. Ascites: large volume on CT imaging, status post paracentesis, albumin infusing  3. Alcohol abuse: ongoing  4. CHF: followed by cardiology  5. A fib: on eliquis   6. CKD: stage III, followed by nephrology        Plan:   -Paracentesis  Was performed, no evidence of SBP, culture negative to date, albumin replacement, numbers consistent with cirrhosis and cardiogenic ascites  -2gm Na diet  -needs complete alcohol cessation  -will need EGD for variceal surveillance as an outpt  - Volume  management per renal, Holding diuretics, giving albumin  - We will follow        I discussed the patients findings and my recommendations with patient.    Moustapha Garcia MD

## 2018-03-11 NOTE — PROGRESS NOTES
[facility]  Cardiology Progress Note    Patient Identification:  Name: Anibal Freedman  Age: 68 y.o.  Sex: male  :  1950  MRN: [id number]                 Follow Up / Chief Complaint: Shortness of breath, swelling, history of CAD, permanent A. fib, systolic heart failure    Interval History:  68-year-old alcoholic with permanent A. fib, CAD and biventricular heart failure admitted with acute on chronic heart failure, ascites/anasara/ cor pulmonale, electrolyte imbalance.  S/P paracentesis 3/9/18.       Subjective:  Denies chest pain, nausea, cough, or PND.    + dizziness upon standing a little better today. reports Improved abd distention    Objective:  Afib, controlled VR PVC's with short runs of non sustained VT    Temp:  [97.6 °F (36.4 °C)-98.7 °F (37.1 °C)] 98.4 °F (36.9 °C)  Heart Rate:  [57-76] 61  Resp:  [18-24] 18  BP: ()/(60-80) 110/80      Intake/Output Summary (Last 24 hours) at 18 0906  Last data filed at 18 0806   Gross per 24 hour   Intake             1220 ml   Output               50 ml   Net             1170 ml       BUN 21 (36 <-- 32 <--27), Cr 2.48 (2.18 <--1.78 <--1.38)  K+ 3.5   Mag 1.8    Past Medical History:  Past Medical History:   Diagnosis Date   • Alcoholism    • Arthritis    • Atrial fibrillation     pt reported   • Cellulitis    • CHF (congestive heart failure)    • Colon polyps 2006    Colonoscopy w/ snare cautery, polypectomy & biopsy, PATH:  Sigmoid Colon Biopsy: focal vascular congestion & evidence of recent hemorrhage, non-specific.  Recto-Sigmoid Colon Biopsy: Fragments of tubular adenoam w/ mild dysplasia. Dr. Mildred You   • DM2 (diabetes mellitus, type 2) 3/7/2018   • History of coronary angioplasty    • History of MRSA infection     pt reported   • History of staph infection 2005   • Hypertension    • Infectious viral hepatitis     pt reported   • Prostate cancer 2010    S/P Radical Prostectomy, Adenocarcinoma,  Primary Yissel Grade 3, Secondar Yissel Grade 3. Combined yissel score 6. Tumor involves rt & lt lobes, negative capsular margin, no invasion of seminal vesicles, no identified perineural invastion   • Withdrawal symptoms, alcohol      Past Surgical History:  Past Surgical History:   Procedure Laterality Date   • APPENDECTOMY     • CARDIAC ABLATION Right 01/18/2013    Atrial Flutter Ablation, Dr. Otis Srinivasan   • CARDIAC CATHETERIZATION N/A 8/2/2016    Procedure: Left Heart Cath   ?right cath too;  Surgeon: Epifanio May MD;  Location: [facility] CATH INVASIVE LOCATION;  Service:    • CARDIAC CATHETERIZATION N/A 8/2/2016    Procedure: Coronary angiography;  Surgeon: Epifanio May MD;  Location: [facility] CATH INVASIVE LOCATION;  Service:    • CARDIAC CATHETERIZATION N/A 8/2/2016    Procedure: Left ventriculography;  Surgeon: Epifanio May MD;  Location: [facility] CATH INVASIVE LOCATION;  Service:    • CARDIAC CATHETERIZATION N/A 8/2/2016    Procedure: Right Heart Cath;  Surgeon: Epifanio May MD;  Location: [facility] CATH INVASIVE LOCATION;  Service:    • COLONOSCOPY N/A 3/15/2017    Procedure: COLONOSCOPY TO CECUM WITH COLD BIOPSY POLYECTOMY;  Surgeon: Anibal Bower MD;  Location: [facility] ENDOSCOPY;  Service:    • COLONOSCOPY W/ BIOPSIES AND POLYPECTOMY N/A 08/21/2006    Colonoscopy w/ snare cautery, polypectomy & biopsy, PATH:  Sigmoid Colon Biopsy: focal vascular congestion & evidence of recent hemorrhage, non-specific.  Recto-Sigmoid Colon Biopsy: Fragments of tubular adenoam w/ mild dysplasia. Dr. Mildred You   • CORONARY ANGIOPLASTY WITH STENT PLACEMENT      x 2    • CYSTOSCOPY N/A 04/28/2010    Cystoscopy w/ transurethral incision of the bladder neck, Dr. ANDREW Cordova   • CYSTOTOMY N/A 09/23/2011    Laparoscopic closure of incidental cystotomy, Laparoscopic incision of pelvic lymphocele, Dr. Jaime Royal   • JOINT REPLACEMENT     • LEG DEBRIDEMENT Left 08/19/2005    Left  Thigh, Debridement skin & subcutaneous tissue muscle w/ closure via advancement flaps, Dr. Mildred You   • LEG DEBRIDEMENT Left 08/08/2005    Debridement of left anterior thigh, Dr. Mildred You   • PROSTATECTOMY N/A 04/28/2010    Adenocarcinoma, Primary Yissel Grade 3, Secondar Yissel Grade 3. Combined yissel score 6. Tumor involves rt & lt lobes, negative capsular margin, no invasion of seminal vesicles, no identified perineural invastion, Dr. Jaime Royal   • SKIN BIOPSY     • TONSILLECTOMY     • TOTAL KNEE ARTHROPLASTY Left 03/25/2008    Left total knee arthroplasty w/ [place] pinless computer navigation, Dr. Ashok Crocker   • WOUND DEBRIDEMENT Left 08/02/2005    Left Buttocks & Left thigh wounds, Debridement of left buttocks & left thigh wounds, Dr. Mildred You        Social History:   Social History   Substance Use Topics   • Smoking status: Former Smoker     Packs/day: 1.50     Quit date: 3/15/2007   • Smokeless tobacco: Not on file   • Alcohol use 3.6 oz/week     6 Cans of beer per week      Comment: 1 pint a day OR MORE OF VODKA      Family History:  Family History   Problem Relation Age of Onset   • Heart disease Mother    • Alcohol abuse Father    • Liver cancer Father    • Cancer Sister    • Hypertension Brother    • Alcohol abuse Brother    • Skin cancer Brother           Allergies:  No Known Allergies     Scheduled Meds:    allopurinol 100 mg Daily   apixaban 2.5 mg Q12H   FLUoxetine 20 mg Daily   folic acid 1 mg Daily   insulin aspart 0-9 Units 4x Daily With Meals & Nightly   ipratropium-albuterol 3 mL Q4H - RT   metoprolol tartrate 25 mg Q12H   multivitamin 1 tablet Daily   neomycin-bacitracin-polymyxin 1 application Daily   pantoprazole 40 mg Daily   thiamine 100 mg Daily           INTAKE AND OUTPUT:    Intake/Output Summary (Last 24 hours) at 03/11/18 0906  Last data filed at 03/11/18 0806   Gross per 24 hour   Intake             1220 ml   Output               50 ml   Net             1170  ml       Review of Systems:   GI:  No nausea or vomiting or abd pain  Cardiac: No chest pain or palpitations: less dizziness upon standing.   Pulmonary:  no cough, less shob    Constitutional:  Temp:  [97.6 °F (36.4 °C)-98.7 °F (37.1 °C)] 98.4 °F (36.9 °C)  Heart Rate:  [57-76] 61  Resp:  [18-24] 18  BP: ()/(60-80) 110/80    Physical Exam:  General:  Appears chronically ill but, in no acute distress  Eyes: PERTL,  HEENT:  + JVD lying supine No mucosal pallor or cyanosis  Respiratory: Respirations regular unlabored at 20 degrees.  BBS with decreased air entry in  lower fields.  No wheezes auscultated  Cardiovascular: S1S2 irregular rate and rhythm. No murmur or rub. 1-+ LE edema, woodsy appearance to lower ext.    Gastrointestinal: Abdomen a bit more distended today than yesterday. Bowel sounds present.  Musculoskeletal: PEDRO x4. No abnormal movements  Extremities:  fingers and toes bita  Skin: Skin warm and dry to touch on torso  LE cool to touch below.    Neuro: AAO x3 CN II-XII grossly intact, noted tremor of right hand  Psych: Mood and affect normal, pleasant and cooperative      Cardiographics  Telemetry: afib cvr  60's  With PVC's and up to 6 beats WCT (see below)                Echocardiogram:     Interpretation Summary     · Left ventricular wall thickness is consistent with mild concentric hypertrophy.  · The following left ventricular wall segments are hypokinetic: mid anterior, apical septal, basal inferoseptal, mid inferoseptal, apex hypokinetic, mid anteroseptal, basal anterior and basal inferoseptal.  · Right ventricular cavity is moderately dilated.  · Left atrial cavity size is moderately dilated.  · Mild pulmonic valve regurgitation is present.  · Mild to moderate tricuspid valve regurgitation is present.  · Mild mitral valve regurgitation is present  · Mild aortic valve regurgitation is present.  · Left Ventricle: Calculated EF = 22.5%.           R&L heart cath 8-2016:  HEMODYNAMIC /  ANGIOGRAPHIC DATA:    .   1. Pulmonary artery systolic pressure was 55/25.  2. Right atrial pressure was 10..  3. Left ventricular end diastolic pressure was 15 mmHg.  4. The left main is normal.  5. The LAD is proximal stent had 40-50% stenosis  6. Nondominant circumflex free of any atherosclerotic narrowing.  7. The right coronary artery is dominant with a diffuse proximal 50% mid 50-60% stenosis.  8. Mild pulmonary hypertension    CXR IMPRESSION:  Borderline cardiomegaly and small left pleural effusion.    CT Abdomen IMPRESSION:  1. Small left pleural effusion.  2. Large amount of ascites.  3. Liver appears somewhat small. Please correlate for cirrhosis.       3/8/18 Echo  Interpretation Summary     · Left ventricular wall thickness is consistent with mild concentric hypertrophy.  · The following left ventricular wall segments are hypokinetic: mid anterior, apical septal, basal inferoseptal, mid inferoseptal, apex hypokinetic, mid anteroseptal, basal anterior and basal inferoseptal.  · Right ventricular cavity is moderately dilated.  · Left atrial cavity size is moderately dilated.  · Mild pulmonic valve regurgitation is present.  · Mild to moderate tricuspid valve regurgitation is present.  · Mild mitral valve regurgitation is present  · Mild aortic valve regurgitation is present.  · Left Ventricle: Calculated EF = 22.5%.     3/10/18  Interpretation Summary     · Myocardial perfusion imaging indicates a normal myocardial perfusion study with no evidence of ischemia.  · Left ventricular ejection fraction is moderately reduced (Calculated EF = 36%).  · Impressions are consistent with a low risk study.            Lab Review     Results from last 7 days  Lab Units 03/08/18  0601 03/08/18  0006 03/07/18  2040   TROPONIN T ng/mL 0.038* 0.038* 0.040*       Results from last 7 days  Lab Units 03/11/18  0458   MAGNESIUM mg/dL 1.8       Results from last 7 days  Lab Units 03/11/18  0458   SODIUM mmol/L 141   POTASSIUM  mmol/L 3.5   BUN mg/dL 21   CREATININE mg/dL 2.48*   CALCIUM mg/dL 6.9*       Results from last 7 days  Lab Units 03/11/18  0458 03/10/18  0356 03/09/18  0412   WBC 10*3/mm3 3.95* 4.18* 4.77   HEMOGLOBIN g/dL 9.9* 10.8* 11.0*   HEMATOCRIT % 30.8* 33.3* 34.4*   PLATELETS 10*3/mm3 100* 118* 131*       Results from last 7 days  Lab Units 03/09/18  0703 03/07/18  1639   INR  1.86* 1.31*   APTT seconds  --  29.7     Estimated Creatinine Clearance: 33.6 mL/min (by C-G formula based on SCr of 2.48 mg/dL (H)).      Assessment:  - a/c R>L systolic heart failure  - cor pulmonale / anasarca / ascites   - CKD III - nephrology following   - Anemia  - alcoholism  - recurrent hypomagnesia  - Thrombocytopenia - 3/11 is 100,000 (118,000 <--131,000 <--114,000)  - CAD - h/o BMS LAD, kissing balloon to adjacent diagonal,  BMS-> mid-distal RCA 1-2013  - (Permanent) atrial fibrillation -> Eliquis  - s/p hypokalemia  - h/o atrial flutter ablation 2013      Plan:  - a/c R and L systolic heart failure -  Symptomatic improvement,  On beta blocker. Diuretic as per nephrology. No ACE-I, ARB, spironolactone, due to CKD.  No BP room for hydralazine.  Echo - EF 22% Mild concentric LVH , mild to mod tr, mild MR, mild AI.    - cor pulmonale / anasarca / ascites/ cirrhosis -   improved lower extremity edema with diuretics. Diuretic management as per Nephrology. S/p Paracentesis 3/9. abd is more distended today.  Appreciate GI's input.  Recommendations for EGD as outpt for variceal surveillance with q 6 months liver imaging.     - (recurrent) hypomagnesia -  Mg 1.8  this am. (was 0.9 on admit).  Electrolyte replacement per Nephrology.  Because of Wide complex tachycardia    - CAD - No chest pain.  MI ruled out.  H/o LAD& RCA PCI & diagonal PTCA 2013. Non obstructive CAD per cath 2016.  Ischemic workup once medically stable.  Myocardial perfusion scan stress test 3/10 showed no evidence of ishcemia. Not currently on ASA or other antiplt.  PLt count is  "low.       - (Permanent) atrial fibrillation -> on Eliquis 2.5 mg BID. Rate controlled. H/o atrial flutter ablation 2013    - WCT -  Would like to see K+ more consistently 4 or greater and Mag 2 or greater with his low EF and short runs of VT. Unable to up titrate Beta Blocker.  Amio would be next option to control ventricular arrythmias but with cirrhosis this is not idea.      - Thrombocytopenia: Has been low and labile since admit. Multifactorial but his hx of ETOH and cirrhosis does play big factor.   Not on heparin or antiplt. Hgb 11.2 on 3/7/18 and 3/11 9/9 (10.8). Did get 500 cc fluids 3/10 per nephrology. Will ask hematology to consult with his new pancytopenia.          Diuretic management as per nephrology.  Case management investigating transfer to rehabilitation at time of discharge.       Labs/tests ordered: Mag, BMP, and CBC    I reviewed the patient's new clinical results and treatment plan  I personally viewed and interpreted the patient's EKG/Telemetry data    35 min spent total: 20 at bedside and 15 min spent in chart review and in coordination of care with other team members.      )3/11/2018  FRANK Gonsalez      EMR Dragon/Transcription:   \"Dictated utilizing Dragon dictation\".     "

## 2018-03-11 NOTE — PROGRESS NOTES
"   LOS: 4 days   Patient Care Team:  Moustapha Long MD as PCP - General  Moustapha Long MD as PCP - Family Medicine    Chief Complaint/ Reason for encounter: Acute renal failure/chronic kidney disease  Chief Complaint   Patient presents with   • Edema         Subjective     History of Present Illness    Subjective:  Symptoms:  Stable.  No shortness of breath or chest pain.  (No new complaints  Ascites somewhat worse but no edema, no worsening shortness of breath).    Diet:  Adequate intake.  No nausea.    Activity level: Returning to normal.    Pain:  He reports no pain.          History taken from: Patient and chart    Objective     Vital Signs  Temp:  [97.6 °F (36.4 °C)-98.7 °F (37.1 °C)] 98.4 °F (36.9 °C)  Heart Rate:  [57-76] 61  Resp:  [18-24] 18  BP: ()/(60-80) 110/80       Wt Readings from Last 1 Encounters:   03/11/18 0455 84.5 kg (186 lb 4 oz)   03/09/18 0348 99.8 kg (220 lb)   03/08/18 0446 99.7 kg (219 lb 12.8 oz)   03/07/18 1249 99.8 kg (220 lb)       Objective:  General Appearance:  Comfortable, well-appearing, in no acute distress and not in pain.    Vital signs: (most recent): Blood pressure 110/80, pulse 61, temperature 98.4 °F (36.9 °C), temperature source Oral, resp. rate 18, height 180.3 cm (71\"), weight 84.5 kg (186 lb 4 oz), SpO2 98 %.  Vital signs are normal.  No fever.    Output: Producing urine.    HEENT: Normal HEENT exam.    Lungs:  Normal effort and normal respiratory rate.  Breath sounds clear to auscultation.  He is not in respiratory distress.  No wheezes.    Heart: Normal rate.  Regular rhythm.  S1 normal.  No murmur.   Abdomen: Abdomen is soft and distended.  There are signs of ascites. (Less distended).  Bowel sounds are normal.   There is no epigastric area or suprapubic area tenderness.  There is no rebound tenderness.   There is no mass.   Extremities: Normal range of motion.  There is dependent edema.  There is no deformity.    Pulses: Distal pulses are intact.  "   Neurological: Patient is alert and oriented to person, place and time.    Skin:  Warm and dry.  No rash or cyanosis.             Results Review:    Past Medical History: reviewed and updated  Past Medical History:   Diagnosis Date   • Alcoholism    • Arthritis    • Atrial fibrillation     pt reported   • Cellulitis    • CHF (congestive heart failure)    • Colon polyps 08/21/2006    Colonoscopy w/ snare cautery, polypectomy & biopsy, PATH:  Sigmoid Colon Biopsy: focal vascular congestion & evidence of recent hemorrhage, non-specific.  Recto-Sigmoid Colon Biopsy: Fragments of tubular adenoam w/ mild dysplasia. Dr. Mildred You   • DM2 (diabetes mellitus, type 2) 3/7/2018   • History of coronary angioplasty    • History of MRSA infection 2005    pt reported   • History of staph infection 08/02/2005   • Hypertension    • Infectious viral hepatitis     pt reported   • Prostate cancer 04/28/2010    S/P Radical Prostectomy, Adenocarcinoma, Primary Cleveland Grade 3, Secondar Cleveland Grade 3. Combined yissel score 6. Tumor involves rt & lt lobes, negative capsular margin, no invasion of seminal vesicles, no identified perineural invastion   • Withdrawal symptoms, alcohol          Allergies:  No Known Allergies    Intake/Output:     Intake/Output Summary (Last 24 hours) at 03/11/18 1025  Last data filed at 03/11/18 1013   Gross per 24 hour   Intake             1338 ml   Output               50 ml   Net             1288 ml         DATA:  Radiology and Labs:  The following labs independently reviewed by me.  Interval notes, chart personally reviewed by me.   Old records independently reviewed showing stage III chronic kidney disease, baseline creatinine around 1.3  The following radiologic studies independently viewed by me, findings 2-D echocardiogram showing EF 30%, Prior chest x-ray no acute disease  New problems include   Renal failure, worsening, hypokalemia and hypomagnesemia, worsening ascites      Risk/ complexity of  medical care/ medical decision making high given new acute renal failure, risk for the deterioration of status if not treated emergently                Labs:   Recent Results (from the past 24 hour(s))   POC Glucose Once    Collection Time: 03/10/18 11:28 AM   Result Value Ref Range    Glucose 91 70 - 130 mg/dL   Chloride, Urine, Random -    Collection Time: 03/10/18  2:19 PM   Result Value Ref Range    Chloride, Urine 32 mmol/L   Creatinine, Urine, Random -    Collection Time: 03/10/18  2:19 PM   Result Value Ref Range    Creatinine, Urine 125.7 mg/dL   Sodium, Urine, Random -    Collection Time: 03/10/18  2:19 PM   Result Value Ref Range    Sodium, Urine 39 mmol/L   POC Glucose Once    Collection Time: 03/10/18  4:20 PM   Result Value Ref Range    Glucose 131 (H) 70 - 130 mg/dL   POC Glucose Once    Collection Time: 03/10/18  7:49 PM   Result Value Ref Range    Glucose 140 (H) 70 - 130 mg/dL   Magnesium    Collection Time: 03/11/18  4:58 AM   Result Value Ref Range    Magnesium 1.8 1.6 - 2.4 mg/dL   Basic Metabolic Panel    Collection Time: 03/11/18  4:58 AM   Result Value Ref Range    Glucose 120 (H) 65 - 99 mg/dL    BUN 21 8 - 23 mg/dL    Creatinine 2.48 (H) 0.76 - 1.27 mg/dL    Sodium 141 136 - 145 mmol/L    Potassium 3.5 3.5 - 5.2 mmol/L    Chloride 97 (L) 98 - 107 mmol/L    CO2 29.6 (H) 22.0 - 29.0 mmol/L    Calcium 6.9 (L) 8.6 - 10.5 mg/dL    eGFR Non African Amer 26 (L) >60 mL/min/1.73    BUN/Creatinine Ratio 8.5 7.0 - 25.0    Anion Gap 14.4 mmol/L   CBC Auto Differential    Collection Time: 03/11/18  4:58 AM   Result Value Ref Range    WBC 3.95 (L) 4.50 - 10.70 10*3/mm3    RBC 3.17 (L) 4.60 - 6.00 10*6/mm3    Hemoglobin 9.9 (L) 13.7 - 17.6 g/dL    Hematocrit 30.8 (L) 40.4 - 52.2 %    MCV 97.2 (H) 79.8 - 96.2 fL    MCH 31.2 27.0 - 32.7 pg    MCHC 32.1 (L) 32.6 - 36.4 g/dL    RDW 14.4 11.5 - 14.5 %    RDW-SD 51.0 37.0 - 54.0 fl    MPV 12.6 (H) 6.0 - 12.0 fL    Platelets 100 (L) 140 - 500 10*3/mm3     Neutrophil % 69.6 42.7 - 76.0 %    Lymphocyte % 12.9 (L) 19.6 - 45.3 %    Monocyte % 16.7 (H) 5.0 - 12.0 %    Eosinophil % 0.5 0.3 - 6.2 %    Basophil % 0.3 0.0 - 1.5 %    Immature Grans % 0.0 0.0 - 0.5 %    Neutrophils, Absolute 2.75 1.90 - 8.10 10*3/mm3    Lymphocytes, Absolute 0.51 (L) 0.90 - 4.80 10*3/mm3    Monocytes, Absolute 0.66 0.20 - 1.20 10*3/mm3    Eosinophils, Absolute 0.02 0.00 - 0.70 10*3/mm3    Basophils, Absolute 0.01 0.00 - 0.20 10*3/mm3    Immature Grans, Absolute 0.00 0.00 - 0.03 10*3/mm3    nRBC 0.0 0.0 - 0.0 /100 WBC   POC Glucose Once    Collection Time: 03/11/18  7:38 AM   Result Value Ref Range    Glucose 107 70 - 130 mg/dL       Radiology:  Imaging Results (last 24 hours)     ** No results found for the last 24 hours. **             Medications have been reviewed:  Current Facility-Administered Medications   Medication Dose Route Frequency Provider Last Rate Last Dose   • acetaminophen (TYLENOL) tablet 650 mg  650 mg Oral Q4H PRN FRANK Moreno       • albumin human 25 % IV SOLN 50 g  50 g Intravenous Once Joseph Trinidad MD       • allopurinol (ZYLOPRIM) tablet 100 mg  100 mg Oral Daily Gal Randall MD   100 mg at 03/11/18 0806   • apixaban (ELIQUIS) tablet 2.5 mg  2.5 mg Oral Q12H Gal Randall MD   2.5 mg at 03/11/18 0806   • calcium gluconate injection 2 g  2 g Intravenous Q4H Joseph Trinidad MD       • FLUoxetine (PROzac) capsule 20 mg  20 mg Oral Daily FRANK Moreno   20 mg at 03/11/18 0806   • folic acid (FOLVITE) tablet 1 mg  1 mg Oral Daily Gal Randall MD   1 mg at 03/11/18 0806   • insulin aspart (novoLOG) injection 0-9 Units  0-9 Units Subcutaneous 4x Daily With Meals & Nightly Jawed William, MD       • ipratropium-albuterol (DUO-NEB) nebulizer solution 3 mL  3 mL Nebulization Q4H - RT Gal Randall MD   3 mL at 03/11/18 0652   • magnesium sulfate in D5W 1g/100mL (PREMIX)  2 g Intravenous Once Joseph Trinidad MD       • metoprolol tartrate (LOPRESSOR) tablet  25 mg  25 mg Oral Q12H Zakiya Valverde APRN   25 mg at 03/11/18 0806   • multivitamin (THERAGRAN) tablet 1 tablet  1 tablet Oral Daily Gal Randall MD   1 tablet at 03/11/18 0806   • neomycin-bacitracin-polymyxin (NEOSPORIN) ointment 1 application  1 application Topical Daily Gal Randall MD   1 application at 03/11/18 0808   • pantoprazole (PROTONIX) EC tablet 40 mg  40 mg Oral Daily Zakiya Valverde APRN   40 mg at 03/11/18 0808   • potassium chloride (MICRO-K) CR capsule 40 mEq  40 mEq Oral PRN Epifanio May MD   40 mEq at 03/08/18 0317   • potassium chloride (MICRO-K) CR capsule 40 mEq  40 mEq Oral Once Joseph Trinidad MD       • sodium chloride 0.9 % flush 1-10 mL  1-10 mL Intravenous PRN FRANK Moreno       • thiamine (VITAMIN B-1) tablet 100 mg  100 mg Oral Daily Gal Randall MD   100 mg at 03/11/18 0806       Assessment/Plan     Principal Problem:    CHF exacerbation  Active Problems:    Right heart failure    Ascites of liver    Hypertension    Atrial fibrillation    Alcoholism    History of coronary angioplasty    DM2 (diabetes mellitus, type 2)    Pancytopenia    Chronic liver disease    Elevated troponin    CKD (chronic kidney disease)    Hypomagnesemia    Hypokalemia      Assessment:  (Assessment:   acute renal failure, likely from diuresis, fortunately no sign of hepatorenal syndrome after paracentesis, worse today  Stage III chronic kidney disease, baseline creatinine 1.3  Acute exacerbation of diastolic congestive heart failure, improved with diuresis  Volume overload, improved with diuresis  Ascites from cirrhosis, slightly worse today  Chronic hypertension  Alcoholism, discussed cessation of alcohol  Borderline elevated troponin, possibly due to chronic kidney disease  Hypokalemia on admission, improved  Metabolic alkalosis  Hypomagnesemia, worse, replace  New hypokalemia, plantar placement        Plan:   His renal function is worse again today  Diuretics remain on hold  Will give an  additional dose of albumin today   Hold off on additional IV fluids  Fortunately no sign of hepatorenal syndrome per urine electrolytes  Monitor and replace electrolytes as needed  Discussed alcohol avoidance, possible rehabilitation after discharge).             Continue to monitor renal function, electroytes and volume closely   Please call me with any questions or concerns      Joseph Trinidad MD   [facility]   [phone]    03/11/18  10:25 AM      Dictation performed using Dragon dictation software

## 2018-03-11 NOTE — PLAN OF CARE
Problem: Patient Care Overview  Goal: Plan of Care Review   03/11/18 1441   Coping/Psychosocial   Plan of Care Reviewed With patient   Plan of Care Review   Progress improving   OTHER   Outcome Summary patient denies any complaints. patient recieved dose of albumin, and mag again today. as well as has orders for calcium. new consult for hem/onc. unsure of discharge date at this moment. Will continue to monitor.

## 2018-03-12 LAB
ALBUMIN SERPL-MCNC: 3.2 G/DL (ref 3.5–5.2)
ALBUMIN/GLOB SERPL: 1.7 G/DL
ALP SERPL-CCNC: 71 U/L (ref 39–117)
ALT SERPL W P-5'-P-CCNC: 15 U/L (ref 1–41)
ANION GAP SERPL CALCULATED.3IONS-SCNC: 11.7 MMOL/L
AST SERPL-CCNC: 24 U/L (ref 1–40)
BASOPHILS # BLD AUTO: 0.01 10*3/MM3 (ref 0–0.2)
BASOPHILS NFR BLD AUTO: 0.3 % (ref 0–1.5)
BILIRUB SERPL-MCNC: 1.1 MG/DL (ref 0.1–1.2)
BUN BLD-MCNC: 38 MG/DL (ref 8–23)
BUN/CREAT SERPL: 18.3 (ref 7–25)
CALCIUM SPEC-SCNC: 8.2 MG/DL (ref 8.6–10.5)
CHLORIDE SERPL-SCNC: 98 MMOL/L (ref 98–107)
CO2 SERPL-SCNC: 28.3 MMOL/L (ref 22–29)
CREAT BLD-MCNC: 2.08 MG/DL (ref 0.76–1.27)
DEPRECATED RDW RBC AUTO: 51.6 FL (ref 37–54)
EOSINOPHIL # BLD AUTO: 0.04 10*3/MM3 (ref 0–0.7)
EOSINOPHIL NFR BLD AUTO: 1 % (ref 0.3–6.2)
ERYTHROCYTE [DISTWIDTH] IN BLOOD BY AUTOMATED COUNT: 14.4 % (ref 11.5–14.5)
GFR SERPL CREATININE-BSD FRML MDRD: 32 ML/MIN/1.73
GLOBULIN UR ELPH-MCNC: 1.9 GM/DL
GLUCOSE BLD-MCNC: 98 MG/DL (ref 65–99)
GLUCOSE BLDC GLUCOMTR-MCNC: 118 MG/DL (ref 70–130)
GLUCOSE BLDC GLUCOMTR-MCNC: 129 MG/DL (ref 70–130)
GLUCOSE BLDC GLUCOMTR-MCNC: 86 MG/DL (ref 70–130)
GLUCOSE BLDC GLUCOMTR-MCNC: 93 MG/DL (ref 70–130)
HCT VFR BLD AUTO: 31.2 % (ref 40.4–52.2)
HGB BLD-MCNC: 10.1 G/DL (ref 13.7–17.6)
HIV1 P24 AG SER QL: NORMAL
HIV1+2 AB SER QL: NORMAL
IMM GRANULOCYTES # BLD: 0 10*3/MM3 (ref 0–0.03)
IMM GRANULOCYTES NFR BLD: 0 % (ref 0–0.5)
INR PPP: 1.64 (ref 0.9–1.1)
LYMPHOCYTES # BLD AUTO: 0.45 10*3/MM3 (ref 0.9–4.8)
LYMPHOCYTES NFR BLD AUTO: 11.4 % (ref 19.6–45.3)
MAGNESIUM SERPL-MCNC: 1.9 MG/DL (ref 1.6–2.4)
MCH RBC QN AUTO: 31.8 PG (ref 27–32.7)
MCHC RBC AUTO-ENTMCNC: 32.4 G/DL (ref 32.6–36.4)
MCV RBC AUTO: 98.1 FL (ref 79.8–96.2)
MONOCYTES # BLD AUTO: 0.74 10*3/MM3 (ref 0.2–1.2)
MONOCYTES NFR BLD AUTO: 18.7 % (ref 5–12)
NEUTROPHILS # BLD AUTO: 2.72 10*3/MM3 (ref 1.9–8.1)
NEUTROPHILS NFR BLD AUTO: 68.6 % (ref 42.7–76)
PLATELET # BLD AUTO: 103 10*3/MM3 (ref 140–500)
PMV BLD AUTO: 12.2 FL (ref 6–12)
POTASSIUM BLD-SCNC: 3.6 MMOL/L (ref 3.5–5.2)
PROT SERPL-MCNC: 5.1 G/DL (ref 6–8.5)
PROTHROMBIN TIME: 19.2 SECONDS (ref 11.7–14.2)
RBC # BLD AUTO: 3.18 10*6/MM3 (ref 4.6–6)
SODIUM BLD-SCNC: 138 MMOL/L (ref 136–145)
VIT B12 BLD-MCNC: 514 PG/ML (ref 211–946)
WBC NRBC COR # BLD: 3.96 10*3/MM3 (ref 4.5–10.7)

## 2018-03-12 PROCEDURE — 87899 AGENT NOS ASSAY W/OPTIC: CPT | Performed by: INTERNAL MEDICINE

## 2018-03-12 PROCEDURE — G0432 EIA HIV-1/HIV-2 SCREEN: HCPCS | Performed by: INTERNAL MEDICINE

## 2018-03-12 PROCEDURE — 97535 SELF CARE MNGMENT TRAINING: CPT | Performed by: OCCUPATIONAL THERAPIST

## 2018-03-12 PROCEDURE — 82607 VITAMIN B-12: CPT | Performed by: INTERNAL MEDICINE

## 2018-03-12 PROCEDURE — 99232 SBSQ HOSP IP/OBS MODERATE 35: CPT | Performed by: INTERNAL MEDICINE

## 2018-03-12 PROCEDURE — 97110 THERAPEUTIC EXERCISES: CPT

## 2018-03-12 PROCEDURE — 83735 ASSAY OF MAGNESIUM: CPT | Performed by: NURSE PRACTITIONER

## 2018-03-12 PROCEDURE — 0W9G3ZZ DRAINAGE OF PERITONEAL CAVITY, PERCUTANEOUS APPROACH: ICD-10-PCS | Performed by: RADIOLOGY

## 2018-03-12 PROCEDURE — 80053 COMPREHEN METABOLIC PANEL: CPT | Performed by: INTERNAL MEDICINE

## 2018-03-12 PROCEDURE — 82962 GLUCOSE BLOOD TEST: CPT

## 2018-03-12 PROCEDURE — 94799 UNLISTED PULMONARY SVC/PX: CPT

## 2018-03-12 PROCEDURE — 85025 COMPLETE CBC W/AUTO DIFF WBC: CPT | Performed by: NURSE PRACTITIONER

## 2018-03-12 PROCEDURE — 85610 PROTHROMBIN TIME: CPT | Performed by: INTERNAL MEDICINE

## 2018-03-12 RX ORDER — BUMETANIDE 1 MG/1
1 TABLET ORAL 2 TIMES DAILY
Status: DISCONTINUED | OUTPATIENT
Start: 2018-03-12 | End: 2018-03-14 | Stop reason: HOSPADM

## 2018-03-12 RX ORDER — IPRATROPIUM BROMIDE AND ALBUTEROL SULFATE 2.5; .5 MG/3ML; MG/3ML
3 SOLUTION RESPIRATORY (INHALATION) EVERY 4 HOURS PRN
Status: DISCONTINUED | OUTPATIENT
Start: 2018-03-12 | End: 2018-03-14 | Stop reason: HOSPADM

## 2018-03-12 RX ADMIN — IPRATROPIUM BROMIDE AND ALBUTEROL SULFATE 3 ML: .5; 3 SOLUTION RESPIRATORY (INHALATION) at 10:21

## 2018-03-12 RX ADMIN — Medication 100 MG: at 08:25

## 2018-03-12 RX ADMIN — METOPROLOL TARTRATE 25 MG: 25 TABLET ORAL at 08:26

## 2018-03-12 RX ADMIN — APIXABAN 2.5 MG: 2.5 TABLET, FILM COATED ORAL at 08:25

## 2018-03-12 RX ADMIN — BUMETANIDE 1 MG: 1 TABLET ORAL at 20:45

## 2018-03-12 RX ADMIN — Medication 1 TABLET: at 08:25

## 2018-03-12 RX ADMIN — FLUOXETINE HYDROCHLORIDE 20 MG: 20 CAPSULE ORAL at 08:27

## 2018-03-12 RX ADMIN — POTASSIUM CHLORIDE 40 MEQ: 750 CAPSULE, EXTENDED RELEASE ORAL at 06:20

## 2018-03-12 RX ADMIN — BACITRACIN ZINC NEOMYCIN SULFATE POLYMYXIN B SULFATE 1 APPLICATION: 400; 3.5; 5 OINTMENT TOPICAL at 16:16

## 2018-03-12 RX ADMIN — FOLIC ACID 1 MG: 1 TABLET ORAL at 08:25

## 2018-03-12 RX ADMIN — BUMETANIDE 1 MG: 1 TABLET ORAL at 16:15

## 2018-03-12 RX ADMIN — IPRATROPIUM BROMIDE AND ALBUTEROL SULFATE 3 ML: .5; 3 SOLUTION RESPIRATORY (INHALATION) at 06:44

## 2018-03-12 RX ADMIN — METOPROLOL TARTRATE 25 MG: 25 TABLET ORAL at 20:45

## 2018-03-12 RX ADMIN — ALLOPURINOL 100 MG: 100 TABLET ORAL at 08:25

## 2018-03-12 RX ADMIN — PANTOPRAZOLE SODIUM 40 MG: 40 TABLET, DELAYED RELEASE ORAL at 08:26

## 2018-03-12 RX ADMIN — IPRATROPIUM BROMIDE AND ALBUTEROL SULFATE 3 ML: .5; 3 SOLUTION RESPIRATORY (INHALATION) at 14:15

## 2018-03-12 NOTE — PLAN OF CARE
Problem: Fall Risk (Adult)  Goal: Identify Related Risk Factors and Signs and Symptoms  Outcome: Ongoing (interventions implemented as appropriate)    Goal: Absence of Fall  Outcome: Ongoing (interventions implemented as appropriate)    Goal: Identify Related Risk Factors and Signs and Symptoms  Outcome: Ongoing (interventions implemented as appropriate)    Goal: Absence of Fall  Outcome: Ongoing (interventions implemented as appropriate)      Problem: Cardiac: Heart Failure (Adult)  Goal: Signs and Symptoms of Listed Potential Problems Will be Absent, Minimized or Managed (Cardiac: Heart Failure)  Outcome: Ongoing (interventions implemented as appropriate)      Problem: Alcohol Withdrawal Acute, Risk/Actual (Adult)  Goal: Signs and Symptoms of Listed Potential Problems Will be Absent, Minimized or Managed (Alcohol Withdrawal Acute, Risk/Actual)  Outcome: Ongoing (interventions implemented as appropriate)      Problem: Skin Injury Risk (Adult)  Goal: Identify Related Risk Factors and Signs and Symptoms  Outcome: Ongoing (interventions implemented as appropriate)    Goal: Skin Health and Integrity  Outcome: Ongoing (interventions implemented as appropriate)      Problem: Patient Care Overview  Goal: Plan of Care Review  Outcome: Ongoing (interventions implemented as appropriate)   03/12/18 2604   OTHER   Outcome Summary Pt's abd round and distended. to have paracentesis 03/13/2018. remains in afib, and weak. Will Continue to monitor.      Goal: Individualization and Mutuality  Outcome: Ongoing (interventions implemented as appropriate)    Goal: Discharge Needs Assessment  Outcome: Ongoing (interventions implemented as appropriate)    Goal: Interprofessional Rounds/Family Conf  Outcome: Ongoing (interventions implemented as appropriate)

## 2018-03-12 NOTE — PLAN OF CARE
Problem: Patient Care Overview  Goal: Plan of Care Review   03/12/18 1058   Coping/Psychosocial   Plan of Care Reviewed With patient   Plan of Care Review   Progress improving   OTHER   Outcome Summary pt ed on ECT for bathing, dressing, and ed on ROM ex for hospital and home. Ed to have wife be there when he showers at home for safety. ed on use of shower seat to help w. ECT. pt understands and no further OT needs.

## 2018-03-12 NOTE — PROGRESS NOTES
REASON FOR FOLLOWUP/CHIEF COMPLAINT:  Cytopenias    HISTORY OF PRESENT ILLNESS:   Nothing new overnight.  Denies bleeding.    Past Medical History, Past Surgical History, Social History, Family History have been reviewed and are without significant changes except as mentioned.    Review of Systems   Review of Systems   Constitutional: Negative for activity change.   HENT: Negative for nosebleeds and trouble swallowing.    Respiratory: Negative for shortness of breath and wheezing.    Cardiovascular: Negative for chest pain and palpitations.   Gastrointestinal: Negative for constipation, diarrhea and nausea.   Genitourinary: Negative for dysuria and hematuria.   Musculoskeletal: Negative for arthralgias and myalgias.   Neurological: Negative for seizures and syncope.   Hematological: Negative for adenopathy. Does not bruise/bleed easily.   Psychiatric/Behavioral: Negative for confusion.       Medications:  The current medication list was reviewed in the EMR    ALLERGIES:  No Known Allergies           Vitals:    03/12/18 0736 03/12/18 1021 03/12/18 1025 03/12/18 1122   BP: 108/72   108/73   BP Location: Left arm   Right arm   Patient Position: Sitting   Sitting   Pulse:  60 63    Resp: 18 16 16 16   Temp: 97.3 °F (36.3 °C)   97.8 °F (36.6 °C)   TempSrc: Oral   Oral   SpO2: 97% 100% 100%    Weight:       Height:         Physical Exam    CONSTITUTIONAL:  Vital signs reviewed.  No distress, looks comfortable.  EYES:  Conjunctiva and lids unremarkable.  PERRLA  EARS,NOSE,MOUTH,THROAT:  Ears and nose appear unremarkable.  Lips, teeth, gums appear unremarkable.  RESPIRATORY:  Normal respiratory effort.  Lungs clear to auscultation bilaterally.  CARDIOVASCULAR:  Normal S1, S2.  No murmurs rubs or gallops.  No significant lower extremity edema.  GASTROINTESTINAL: Abdomen appears unremarkable.  Nontender.  No hepatomegaly.  No splenomegaly.  NEURO: cranial nerves 2-12 grossly intact.  No focal deficits.  Appears to  have equal strength all 4 extremities.  MUSCULOSKELETAL:  Unremarkable digits/nails.  No cyanosis or clubbing.  SKIN:  Warm.  No rashes.  PSYCHIATRIC:  Normal judgment and insight.  Normal mood and affect.         RECENT LABS:  WBC   Date Value Ref Range Status   03/12/2018 3.96 (L) 4.50 - 10.70 10*3/mm3 Final   03/11/2018 3.95 (L) 4.50 - 10.70 10*3/mm3 Final   03/10/2018 4.18 (L) 4.50 - 10.70 10*3/mm3 Final     Hemoglobin   Date Value Ref Range Status   03/12/2018 10.1 (L) 13.7 - 17.6 g/dL Final   03/11/2018 9.9 (L) 13.7 - 17.6 g/dL Final   03/10/2018 10.8 (L) 13.7 - 17.6 g/dL Final     Platelets   Date Value Ref Range Status   03/12/2018 103 (L) 140 - 500 10*3/mm3 Final   03/11/2018 100 (L) 140 - 500 10*3/mm3 Final   03/10/2018 118 (L) 140 - 500 10*3/mm3 Final                 ASSESSMENT/PLAN:  *Leukocytopenia.  Mild.  Monitor.    *Anemia.  Hb stable around 10.    *Thrombocytopenia.  PLT stable around 100.    *Etiologies of cytopenias: Suspect due to cirrhosis.  B12 checked and unremarkable.  I will add a folate.    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  Suspect this is the reason for cytopenias.    *Ascites with 20 L removed 3/9/18.    Plan  Folate and cbc in am.  Patient thinks he will be discharged tomorrow.  No problems with this from my standpoint.  I do not think he needs follow-up in our office.  He can follow up with his PCP.

## 2018-03-12 NOTE — PROGRESS NOTES
"   LOS: 5 days   Patient Care Team:  Moustapha Long MD as PCP - General  Moustapha Long MD as PCP - Family Medicine    Chief Complaint/ Reason for encounter: Acute renal failure/chronic kidney disease  Chief Complaint   Patient presents with   • Edema         Subjective     History of Present Illness    Subjective:  Symptoms:  Improved.  No shortness of breath or chest pain.  (No new complaints  Ascites stable  No edema).    Diet:  Adequate intake.  No nausea.    Activity level: Returning to normal.    Pain:  He reports no pain.          History taken from: Patient and chart    Objective     Vital Signs  Temp:  [97.3 °F (36.3 °C)-98.3 °F (36.8 °C)] 97.8 °F (36.6 °C)  Heart Rate:  [53-66] 63  Resp:  [16-20] 16  BP: ()/(62-78) 108/73       Wt Readings from Last 1 Encounters:   03/12/18 0500 86 kg (189 lb 8 oz)   03/11/18 0455 84.5 kg (186 lb 4 oz)   03/09/18 0348 99.8 kg (220 lb)   03/08/18 0446 99.7 kg (219 lb 12.8 oz)   03/07/18 1249 99.8 kg (220 lb)       Objective:  General Appearance:  Comfortable, well-appearing, in no acute distress and not in pain.    Vital signs: (most recent): Blood pressure 108/73, pulse 63, temperature 97.8 °F (36.6 °C), temperature source Oral, resp. rate 16, height 180.3 cm (71\"), weight 86 kg (189 lb 8 oz), SpO2 100 %.  Vital signs are normal.  No fever.    Output: Producing urine.    HEENT: Normal HEENT exam.    Lungs:  Normal effort and normal respiratory rate.  Breath sounds clear to auscultation.  He is not in respiratory distress.  No wheezes.    Heart: Normal rate.  Regular rhythm.  S1 normal.  No murmur.   Abdomen: Abdomen is soft and non-distended.  There are signs of ascites. (Less distended).  Bowel sounds are normal.   There is no epigastric area or suprapubic area tenderness.  There is no rebound tenderness.   There is no mass.   Extremities: Normal range of motion.  There is dependent edema.  There is no deformity.    Pulses: Distal pulses are intact.  "   Neurological: Patient is alert and oriented to person, place and time.    Skin:  Warm and dry.  No rash or cyanosis.             Results Review:    Past Medical History: reviewed and updated  Past Medical History:   Diagnosis Date   • Alcoholism    • Arthritis    • Atrial fibrillation     pt reported   • Cellulitis    • CHF (congestive heart failure)    • Colon polyps 08/21/2006    Colonoscopy w/ snare cautery, polypectomy & biopsy, PATH:  Sigmoid Colon Biopsy: focal vascular congestion & evidence of recent hemorrhage, non-specific.  Recto-Sigmoid Colon Biopsy: Fragments of tubular adenoam w/ mild dysplasia. Dr. Mildred You   • DM2 (diabetes mellitus, type 2) 3/7/2018   • History of coronary angioplasty    • History of MRSA infection 2005    pt reported   • History of staph infection 08/02/2005   • Hypertension    • Infectious viral hepatitis     pt reported   • Prostate cancer 04/28/2010    S/P Radical Prostectomy, Adenocarcinoma, Primary [place] Grade 3, Secondar [place] Grade 3. Combined yissel score 6. Tumor involves rt & lt lobes, negative capsular margin, no invasion of seminal vesicles, no identified perineural invastion   • Withdrawal symptoms, alcohol          Allergies:  No Known Allergies    Intake/Output:     Intake/Output Summary (Last 24 hours) at 03/12/18 1237  Last data filed at 03/12/18 0940   Gross per 24 hour   Intake             1080 ml   Output                0 ml   Net             1080 ml         DATA:  Radiology and Labs:  The following labs independently reviewed by me.  Interval notes, chart personally reviewed by me.   Old records independently reviewed showing stage III chronic kidney disease, baseline creatinine around 1.3        Labs:   Recent Results (from the past 24 hour(s))   POC Glucose Once    Collection Time: 03/11/18  4:53 PM   Result Value Ref Range    Glucose 84 70 - 130 mg/dL   POC Glucose Once    Collection Time: 03/11/18  6:02 PM   Result Value Ref Range    Glucose 111  70 - 130 mg/dL   POC Glucose Once    Collection Time: 03/11/18  8:14 PM   Result Value Ref Range    Glucose 118 70 - 130 mg/dL   Magnesium    Collection Time: 03/12/18  4:48 AM   Result Value Ref Range    Magnesium 1.9 1.6 - 2.4 mg/dL   Vitamin B12    Collection Time: 03/12/18  4:48 AM   Result Value Ref Range    Vitamin B-12 514 211 - 946 pg/mL   HIV-1 / O / 2 Ag / Antibody 4th Generation    Collection Time: 03/12/18  4:48 AM   Result Value Ref Range    HIV-1/ HIV-2 Non-Reactive Non-Reactive    HIV-1 P24 Ag Screen Non-Reactive Non-Reactive   Comprehensive Metabolic Panel    Collection Time: 03/12/18  4:48 AM   Result Value Ref Range    Glucose 98 65 - 99 mg/dL    BUN 38 (H) 8 - 23 mg/dL    Creatinine 2.08 (H) 0.76 - 1.27 mg/dL    Sodium 138 136 - 145 mmol/L    Potassium 3.6 3.5 - 5.2 mmol/L    Chloride 98 98 - 107 mmol/L    CO2 28.3 22.0 - 29.0 mmol/L    Calcium 8.2 (L) 8.6 - 10.5 mg/dL    Total Protein 5.1 (L) 6.0 - 8.5 g/dL    Albumin 3.20 (L) 3.50 - 5.20 g/dL    ALT (SGPT) 15 1 - 41 U/L    AST (SGOT) 24 1 - 40 U/L    Alkaline Phosphatase 71 39 - 117 U/L    Total Bilirubin 1.1 0.1 - 1.2 mg/dL    eGFR Non African Amer 32 (L) >60 mL/min/1.73    Globulin 1.9 gm/dL    A/G Ratio 1.7 g/dL    BUN/Creatinine Ratio 18.3 7.0 - 25.0    Anion Gap 11.7 mmol/L   Protime-INR    Collection Time: 03/12/18  4:48 AM   Result Value Ref Range    Protime 19.2 (H) 11.7 - 14.2 Seconds    INR 1.64 (H) 0.90 - 1.10   CBC Auto Differential    Collection Time: 03/12/18  4:48 AM   Result Value Ref Range    WBC 3.96 (L) 4.50 - 10.70 10*3/mm3    RBC 3.18 (L) 4.60 - 6.00 10*6/mm3    Hemoglobin 10.1 (L) 13.7 - 17.6 g/dL    Hematocrit 31.2 (L) 40.4 - 52.2 %    MCV 98.1 (H) 79.8 - 96.2 fL    MCH 31.8 27.0 - 32.7 pg    MCHC 32.4 (L) 32.6 - 36.4 g/dL    RDW 14.4 11.5 - 14.5 %    RDW-SD 51.6 37.0 - 54.0 fl    MPV 12.2 (H) 6.0 - 12.0 fL    Platelets 103 (L) 140 - 500 10*3/mm3    Neutrophil % 68.6 42.7 - 76.0 %    Lymphocyte % 11.4 (L) 19.6 - 45.3 %     Monocyte % 18.7 (H) 5.0 - 12.0 %    Eosinophil % 1.0 0.3 - 6.2 %    Basophil % 0.3 0.0 - 1.5 %    Immature Grans % 0.0 0.0 - 0.5 %    Neutrophils, Absolute 2.72 1.90 - 8.10 10*3/mm3    Lymphocytes, Absolute 0.45 (L) 0.90 - 4.80 10*3/mm3    Monocytes, Absolute 0.74 0.20 - 1.20 10*3/mm3    Eosinophils, Absolute 0.04 0.00 - 0.70 10*3/mm3    Basophils, Absolute 0.01 0.00 - 0.20 10*3/mm3    Immature Grans, Absolute 0.00 0.00 - 0.03 10*3/mm3   POC Glucose Once    Collection Time: 03/12/18  8:01 AM   Result Value Ref Range    Glucose 86 70 - 130 mg/dL   POC Glucose Once    Collection Time: 03/12/18 11:50 AM   Result Value Ref Range    Glucose 118 70 - 130 mg/dL       Radiology:  Imaging Results (last 24 hours)     ** No results found for the last 24 hours. **             Medications have been reviewed:  Current Facility-Administered Medications   Medication Dose Route Frequency Provider Last Rate Last Dose   • acetaminophen (TYLENOL) tablet 650 mg  650 mg Oral Q4H PRN FRANK Moreno       • allopurinol (ZYLOPRIM) tablet 100 mg  100 mg Oral Daily Gal Randall MD   100 mg at 03/12/18 0825   • bumetanide (BUMEX) tablet 1 mg  1 mg Oral BID Joseph Trinidad MD       • FLUoxetine (PROzac) capsule 20 mg  20 mg Oral Daily FRANK Moreno   20 mg at 03/12/18 0827   • folic acid (FOLVITE) tablet 1 mg  1 mg Oral Daily Gal Randall MD   1 mg at 03/12/18 0825   • insulin aspart (novoLOG) injection 0-9 Units  0-9 Units Subcutaneous 4x Daily With Meals & Nightly Soniya Thomas MD       • ipratropium-albuterol (DUO-NEB) nebulizer solution 3 mL  3 mL Nebulization Q4H - RT Gal Randall MD   3 mL at 03/12/18 1021   • metoprolol tartrate (LOPRESSOR) tablet 25 mg  25 mg Oral Q12H FRANK Moreno   25 mg at 03/12/18 0826   • multivitamin (THERAGRAN) tablet 1 tablet  1 tablet Oral Daily Gal Randall MD   1 tablet at 03/12/18 0825   • neomycin-bacitracin-polymyxin (NEOSPORIN) ointment 1 application  1 application Topical Daily Gal  MD Tonia   1 application at 03/11/18 0808   • pantoprazole (PROTONIX) EC tablet 40 mg  40 mg Oral Daily FRANK Moreno   40 mg at 03/12/18 0826   • potassium chloride (MICRO-K) CR capsule 40 mEq  40 mEq Oral PRN Epifanio May MD   40 mEq at 03/12/18 0620   • sodium chloride 0.9 % flush 1-10 mL  1-10 mL Intravenous PRN FRANK Moreno       • thiamine (VITAMIN B-1) tablet 100 mg  100 mg Oral Daily Gal Randall MD   100 mg at 03/12/18 0825       Assessment/Plan     Principal Problem:    CHF exacerbation  Active Problems:    Right heart failure    Ascites of liver    Hypertension    Atrial fibrillation    Alcoholism    History of coronary angioplasty    DM2 (diabetes mellitus, type 2)    Pancytopenia    Chronic liver disease    Elevated troponin    CKD (chronic kidney disease)    Hypomagnesemia    Hypokalemia      Assessment:  (Assessment:   acute renal failure, likely from diuresis, fortunately no sign of hepatorenal syndrome after paracentesis, slightly improved  Stage III chronic kidney disease, baseline creatinine 1.3  Acute exacerbation of diastolic congestive heart failure, improved with diuresis  Volume overload, improved with diuresis  Ascites from cirrhosis, slightly worse today  Chronic hypertension  Alcoholism, discussed cessation of alcohol  Borderline elevated troponin, possibly due to chronic kidney disease  Hypokalemia on admission, improved  Metabolic alkalosis  Hypomagnesemia, worse, replace  New hypokalemia, plantar placement        Plan:   His renal function is improved today  Resume diuretics at half prior home dose  Recommend albumin immediately after any future paracentesis    Fortunately no sign of hepatorenal syndrome per urine electrolytes  Monitor and replace electrolytes as needed  Discussed alcohol avoidance, possible rehabilitation after discharge  Should be okay for discharge tomorrow if kidney function continues to improve).             Continue to monitor renal function,  electroytes and volume closely   Please call me with any questions or concerns      Joseph Trinidad MD   [facility]   [phone]    03/12/18  12:37 PM      Dictation performed using Dragon dictation software

## 2018-03-12 NOTE — PROGRESS NOTES
"[facility]  Cardiology Progress Note    Patient Identification:  Name: Anibal Freedman  Age: 68 y.o.  Sex: male  :  1950  MRN: [id number]                 Follow Up / Chief Complaint: Shortness of breath, swelling, history of CAD, permanent A. fib, systolic heart failure    Interval History:  68-year-old alcoholic with permanent A. fib, CAD and biventricular heart failure admitted with acute on chronic heart failure, ascites/anasara/ cor pulmonale, electrolyte imbalance.  S/P paracentesis 3/9/18.       Subjective:  \"I feel great\".  Denies chest pain, palpitations or dizziness. States he has ambulated in the halls with \"a little\" shortness of breath.  Denies nausea or vomiting.      Reviewed cardiac test results to date including I reviewed stress test results with it's sensitivity/specificity of approximately 85% with the patient.He was advised that no further ischemic workup is planned at this time, Discussed 's and psychiatry's notes regarding plans for alcoholic rehabilitation recovery works in [facility] after discharge.  (Information booklet noted at bedside).  All questions answered.  He voiced understanding and agreement with treatment plan    Objective:  Afib,cvr with occasional PVC  BP 95-62 - 108/72    Inaccurate I&O  >16L net fluid deficit  BUN 38 (21), Cr 2.08 (2.48)  Plt 100->103    Past Medical History:  Past Medical History:   Diagnosis Date   • Alcoholism    • Arthritis    • Atrial fibrillation     pt reported   • Cellulitis    • CHF (congestive heart failure)    • Colon polyps 2006    Colonoscopy w/ snare cautery, polypectomy & biopsy, PATH:  Sigmoid Colon Biopsy: focal vascular congestion & evidence of recent hemorrhage, non-specific.  Recto-Sigmoid Colon Biopsy: Fragments of tubular adenoam w/ mild dysplasia. Dr. Mildred You   • DM2 (diabetes mellitus, type 2) 3/7/2018   • History of coronary angioplasty    • History of MRSA infection     pt " reported   • History of staph infection 08/02/2005   • Hypertension    • Infectious viral hepatitis     pt reported   • Prostate cancer 04/28/2010    S/P Radical Prostectomy, Adenocarcinoma, Primary [place] Grade 3, Secondar Yiss[place] Grade 3. Combined yissel score 6. Tumor involves rt & lt lobes, negative capsular margin, no invasion of seminal vesicles, no identified perineural invastion   • Withdrawal symptoms, alcohol      Past Surgical History:  Past Surgical History:   Procedure Laterality Date   • APPENDECTOMY     • CARDIAC ABLATION Right 01/18/2013    Atrial Flutter Ablation, Dr. Otis Sriinvasan   • CARDIAC CATHETERIZATION N/A 8/2/2016    Procedure: Left Heart Cath   ?right cath too;  Surgeon: Epifanio May MD;  Location: [facility] CATH INVASIVE LOCATION;  Service:    • CARDIAC CATHETERIZATION N/A 8/2/2016    Procedure: Coronary angiography;  Surgeon: Epifanio May MD;  Location[facility] CATH [facility]VASIVE LOC[facility];  Service:    • CARDIAC CATHETERIZATION N/A 8/2/2016    Procedure: Left ventriculography;  Surgeon: Epifanio May MD;  [facility];  Service:    • CARDIAC CATHETERIZATION N/A 8/2/2016    Procedure: Right Heart Cath;  Surgeon: Epifanio May MD;  Location: [facility] CATH INVASIVE LOCATION;  Service:    • COLONOSCOPY N/A 3/15/2017    Procedure: COLONOSCOPY TO CECUM WITH COLD BIOPSY POLYECTOMY;  Surgeon: Anibal Bower MD;  Location: [facility] ENDOSCOPY;  Service:    • COLONOSCOPY W/ BIOPSIES AND POLYPECTOMY N/A 08/21/2006    Colonoscopy w/ snare cautery, polypectomy & biopsy, PATH:  Sigmoid Colon Biopsy: focal vascular congestion & evidence of recent hemorrhage, non-specific.  Recto-Sigmoid Colon Biopsy: Fragments of tubular adenoam w/ mild dysplasia. Dr. Mildred You   • CORONARY ANGIOPLASTY WITH STENT PLACEMENT      x 2    • CYSTOSCOPY N/A 04/28/2010    Cystoscopy w/ transurethral incision of the bladder neck, Dr. NADREW Cordova   • CYSTOTOMY N/A  09/23/2011    Laparoscopic closure of incidental cystotomy, Laparoscopic incision of pelvic lymphocele, Dr. Jaime Royal   • JOINT REPLACEMENT     • LEG DEBRIDEMENT Left 08/19/2005    Left Thigh, Debridement skin & subcutaneous tissue muscle w/ closure via advancement flaps, Dr. Mildred You   • LEG DEBRIDEMENT Left 08/08/2005    Debridement of left anterior thigh, Dr. Mildred You   • PROSTATECTOMY N/A 04/28/2010    Adenocarcinoma, Primary [place] Grade 3, Secondar Yissel Grade 3. Combined yissel score 6. Tumor involves rt & lt lobes, negative capsular margin, no invasion of seminal vesicles, no identified perineural invastion, Dr. Jaime Royal   • SKIN BIOPSY     • TONSILLECTOMY     • TOTAL KNEE ARTHROPLASTY Left 03/25/2008    Left total knee arthroplasty w/ [place] pinless computer navigation, Dr. Ashok Crocker   • WOUND DEBRIDEMENT Left 08/02/2005    Left Buttocks & Left thigh wounds, Debridement of left buttocks & left thigh wounds, Dr. Mildred You        Social History:   Social History   Substance Use Topics   • Smoking status: Former Smoker     Packs/day: 1.50     Quit date: 3/15/2007   • Smokeless tobacco: Not on file   • Alcohol use 3.6 oz/week     6 Cans of beer per week      Comment: 1 pint a day OR MORE OF VODKA      Family History:  Family History   Problem Relation Age of Onset   • Heart disease Mother    • Alcohol abuse Father    • Liver cancer Father    • Cancer Sister    • Hypertension Brother    • Alcohol abuse Brother    • Skin cancer Brother           Allergies:  No Known Allergies     Scheduled Meds:    allopurinol 100 mg Daily   apixaban 2.5 mg Q12H   FLUoxetine 20 mg Daily   folic acid 1 mg Daily   insulin aspart 0-9 Units 4x Daily With Meals & Nightly   ipratropium-albuterol 3 mL Q4H - RT   metoprolol tartrate 25 mg Q12H   multivitamin 1 tablet Daily   neomycin-bacitracin-polymyxin 1 application Daily   pantoprazole 40 mg Daily   thiamine 100 mg Daily           INTAKE AND  OUTPUT:    Intake/Output Summary (Last 24 hours) at 03/12/18 0757  Last data filed at 03/12/18 0600   Gross per 24 hour   Intake             1318 ml   Output                0 ml   Net             1318 ml       Review of Systems:   GI:  No nausea or vomiting  Cardiac:  No chest pain or palpitations  Pulmonary: No increased work of breathing on room air    Constitutional:  Temp:  [97.3 °F (36.3 °C)-98.3 °F (36.8 °C)] 97.3 °F (36.3 °C)  Heart Rate:  [53-71] 63  Resp:  [16-20] 18  BP: ()/(62-80) 108/72    Physical Exam:  General:  Sitting up at bedside eating breakfast.  Appears chronically ill but, in no acute distress  Eyes: PERTL,  HEENT:  + JVD sitting up in chair. Oral mucosa moist, no cyanosis  Respiratory: Respirations regular unlabored at rest on room air BBS with decreased air entry in R>L  lower fields.  No wheezes auscultated  Cardiovascular: S1S2 irregular rate and rhythm. No murmur or rub. Trace-1+ pretibial edema  Gastrointestinal: Abdomen round +ascites. Non tender. Bowel sounds present.  Musculoskeletal: PEDRO x4. No abnormal movements  Extremities:  fingers and toes bita  Skin: Skin warm and dry to touch on torso  LE cool to touch below.    Neuro: AAO x3 CN II-XII grossly intact, noted tremor of right hand  Psych: Mood and affect normal, pleasant and cooperative      Cardiographics  Telemetry: afib cvr  60's with occasional PVC         Echocardiogram:   · Left ventricular wall thickness is consistent with mild concentric hypertrophy.  · The following left ventricular wall segments are hypokinetic: mid anterior, apical septal, basal inferoseptal, mid inferoseptal, apex hypokinetic, mid anteroseptal, basal anterior and basal inferoseptal.  · Right ventricular cavity is moderately dilated.  · Left atrial cavity size is moderately dilated.  · Mild pulmonic valve regurgitation is present.  · Mild to moderate tricuspid valve regurgitation is present.  · Mild mitral valve regurgitation is present  · Mild  aortic valve regurgitation is present.  · Left Ventricle: Calculated EF = 22.5%        R&L heart cath 8-2016:  HEMODYNAMIC / ANGIOGRAPHIC DATA:    .   1. Pulmonary artery systolic pressure was 55/25.  2. Right atrial pressure was 10..  3. Left ventricular end diastolic pressure was 15 mmHg.  4. The left main is normal.  5. The LAD is proximal stent had 40-50% stenosis  6. Nondominant circumflex free of any atherosclerotic narrowing.  7. The right coronary artery is dominant with a diffuse proximal 50% mid 50-60% stenosis.  8. Mild pulmonary hypertension    3/10/18  Interpretation Summary     · Myocardial perfusion imaging indicates a normal myocardial perfusion study with no evidence of ischemia.  · Left ventricular ejection fraction is moderately reduced (Calculated EF = 36%).  · Impressions are consistent with a low risk study.            Lab Review     Results from last 7 days  Lab Units 03/12/18  0448   MAGNESIUM mg/dL 1.9     Results from last 7 days  Lab Units 03/12/18  0448   SODIUM mmol/L 138   POTASSIUM mmol/L 3.6   BUN mg/dL 38*   CREATININE mg/dL 2.08*   CALCIUM mg/dL 8.2*     Results from last 7 days  Lab Units 03/12/18  0448 03/11/18  0458 03/10/18  0356   WBC 10*3/mm3 3.96* 3.95* 4.18*   HEMOGLOBIN g/dL 10.1* 9.9* 10.8*   HEMATOCRIT % 31.2* 30.8* 33.3*   PLATELETS 10*3/mm3 103* 100* 118*     Results from last 7 days  Lab Units 03/12/18  0448 03/09/18  0703 03/07/18  1639   INR  1.64* 1.86* 1.31*   APTT seconds  --   --  29.7     Estimated Creatinine Clearance: 40.8 mL/min (by C-G formula based on SCr of 2.08 mg/dL (H)).      Assessment:  - a/c biventricular systolic heart failure  - cor pulmonale / anasarca / ascites   - CKD III - nephrology following   - Anemia  - alcoholism  - Thrombocytopenia -> Hematology  - CAD - h/o BMS LAD, kissing balloon to adjacent diagonal,  BMS-> mid-distal RCA 1-2013  - (Permanent) atrial fibrillation -> Eliquis    - s/p recurrent hypomagnesia  - s/p hypokalemia  - h/o  "atrial flutter ablation 2013      Plan:  - a/c biventricular heart failure -  Symptomatic improvement after aggressive diuresis, medication adjustment and paracentesis.  On beta blocker, diuretic as per nephrology. No ACE-I, ARB, spironolactone, due to CKD. Echo - EF 22% Mild concentric LVH , mild to mod tr, mild MR, mild AI.    - cor pulmonale / anasarca / ascites/ cirrhosis -   improved lower extremity edema with diuretics. Diuretic management as per Nephrology. S/p Paracentesis 3/9. abd is more distended today.  Appreciate GI's input.  Recommendations for EGD as outpt for variceal surveillance with q 6 months liver imaging.     - CAD - No chest pain.  MI ruled out.  Stress test (-) for ischemia.  H/o LAD& RCA PCI & diagonal PTCA 2013. Non obstructive CAD per cath 2016.  On beta blocker (No ACE-I, ARB, diuretic due to CKD, BP too low for hydralazine).     - (Permanent) atrial fibrillation -> on Eliquis. Rate controlled. H/o atrial flutter ablation 2013      Discharge when cleared by all services.  Patient plans to seek assistance with alcohol dependence at [facility] in [place], [place] after discharge    Labs/tests ordered:  BMP        I reviewed the patient's new clinical results and treatment plan  I personally viewed and interpreted the patient's EKG/Telemetry data    )3/12/2018  Epifanio May MD      EMR Dragon/Transcription:   \"Dictated utilizing Dragon dictation\".     "

## 2018-03-12 NOTE — PROGRESS NOTES
"Daily progress note    Chief complaint  Doing same  No specific complaints    History of present illness  68-year-old white male who is well-known to our service from multiple admissions in the past with history of chronic kidney disease stage III hypertension diastolic CHF prostate cancer coronary artery disease depression with atrial fibrillation on Eliquis presented to [facility] emergency room with lower extremity swelling and weight gain shortness of breath.  Patient evaluated in ER found to be in decompensated CHF cor pulmonale and anasarca admitted for management.  Patient also continued drink and needs detoxification.  Patient denies any chest pain fever chills cough abdominal pain vomiting diarrhea but he has been nauseated.     REVIEW OF SYSTEMS  Review of Systems   Constitutional: Negative for activity change, appetite change and fever.   HENT: Negative for congestion and sore throat.    Eyes: Negative.    Respiratory: Positive for shortness of breath (on exertion). Negative for cough.    Cardiovascular: Positive for leg swelling (BLE). Negative for chest pain.   Gastrointestinal: Positive for abdominal distention. Negative for abdominal pain, diarrhea and vomiting.   Endocrine: Negative.    Genitourinary: Negative for decreased urine volume and dysuria.   Musculoskeletal: Negative for neck pain.   Skin: Negative for rash and wound.   Allergic/Immunologic: Negative.    Neurological: Negative for weakness, numbness and headaches.   Hematological: Negative.    Psychiatric/Behavioral: Negative.    All other systems reviewed and are negative.     PHYSICAL EXAM  Blood pressure 108/73, pulse 58, temperature 97.8 °F (36.6 °C), temperature source Oral, resp. rate 20, height 180.3 cm (71\"), weight 86 kg (189 lb 8 oz), SpO2 97 %.    Constitutional: He is oriented to person, place, and time and well-developed, well-nourished, and in no distress.   chronically ill appearring, older than stated age   Head: " Normocephalic and atraumatic.   Eyes: EOM are normal. Pupils are equal, round, and reactive to light. No scleral icterus.   Neck: Normal range of motion. Neck supple.   Cardiovascular: Normal rate and normal heart sounds.  An irregularly irregular rhythm present.   Pulmonary/Chest: Effort normal and breath sounds normal. No respiratory distress.   Crackles in the bases of the lungs   Abdominal: Soft. There is no tenderness. There is no rebound and no guarding.   Ascites severe   Musculoskeletal: Normal range of motion. He exhibits edema (2+ in BLE).   Neurological: He is alert and oriented to person, place, and time. He has normal sensation and normal strength.   Skin: Skin is warm and dry.   Psychiatric: Mood and affect normal.     LAB RESULTS  Lab Results (last 24 hours)     Procedure Component Value Units Date/Time    POC Glucose Once [[id number]]  (Normal) Collected:  03/12/18 1150    Specimen:  Blood Updated:  03/12/18 1210     Glucose 118 mg/dL     Narrative:       Meter: [id number] : [id number] Madi Kelly NA    HIV-1 / O / 2 Ag / Antibody 4th Generation [[id number]]  (Normal) Collected:  03/12/18 0448    Specimen:  Blood Updated:  03/12/18 0913     HIV-1/ HIV-2 Non-Reactive     HIV-1 P24 Ag Screen Non-Reactive    POC Glucose Once [[id number]]  (Normal) Collected:  03/12/18 0801    Specimen:  Blood Updated:  03/12/18 0803     Glucose 86 mg/dL     Narrative:       Meter: [id number] : [id number] Madi Kelly NA    Body Fluid Culture - Body Fluid, Peritoneum [[id number]]  (Normal) Collected:  03/09/18 1410    Specimen:  Body Fluid from Peritoneum Updated:  03/12/18 0639     BF Culture No growth at 3 days     Gram Stain Result Few (2+) WBCs seen      No organisms seen    Vitamin B12 [[id number]]  (Normal) Collected:  03/12/18 0448    Specimen:  Blood Updated:  03/12/18 0612     Vitamin B-12 514 pg/mL     Comprehensive Metabolic Panel [[id number]]  (Abnormal) Collected:  03/12/18 0448    Specimen:  Blood Updated:   03/12/18 0600     Glucose 98 mg/dL      BUN 38 (H) mg/dL      Creatinine 2.08 (H) mg/dL      Sodium 138 mmol/L      Potassium 3.6 mmol/L      Chloride 98 mmol/L      CO2 28.3 mmol/L      Calcium 8.2 (L) mg/dL      Total Protein 5.1 (L) g/dL      Albumin 3.20 (L) g/dL      ALT (SGPT) 15 U/L      AST (SGOT) 24 U/L      Alkaline Phosphatase 71 U/L      Total Bilirubin 1.1 mg/dL      eGFR Non African Amer 32 (L) mL/min/1.73      Globulin 1.9 gm/dL      A/G Ratio 1.7 g/dL      BUN/Creatinine Ratio 18.3     Anion Gap 11.7 mmol/L     Magnesium [[id number]]  (Normal) Collected:  03/12/18 0448    Specimen:  Blood Updated:  03/12/18 0552     Magnesium 1.9 mg/dL     Protime-INR [[id number]]  (Abnormal) Collected:  03/12/18 0448    Specimen:  Blood Updated:  03/12/18 0544     Protime 19.2 (H) Seconds      INR 1.64 (H)    CBC & Differential [[id number]] Collected:  03/12/18 0448    Specimen:  Blood Updated:  03/12/18 0543    Narrative:       The following orders were created for panel order CBC & Differential.  Procedure                               Abnormality         Status                     ---------                               -----------         ------                     CBC Auto Differential[[id number]]        Abnormal            Final result                 Please view results for these tests on the individual orders.    CBC Auto Differential [[id number]]  (Abnormal) Collected:  03/12/18 0448    Specimen:  Blood Updated:  03/12/18 0543     WBC 3.96 (L) 10*3/mm3      RBC 3.18 (L) 10*6/mm3      Hemoglobin 10.1 (L) g/dL      Hematocrit 31.2 (L) %      MCV 98.1 (H) fL      MCH 31.8 pg      MCHC 32.4 (L) g/dL      RDW 14.4 %      RDW-SD 51.6 fl      MPV 12.2 (H) fL      Platelets 103 (L) 10*3/mm3      Neutrophil % 68.6 %      Lymphocyte % 11.4 (L) %      Monocyte % 18.7 (H) %      Eosinophil % 1.0 %      Basophil % 0.3 %      Immature Grans % 0.0 %      Neutrophils, Absolute 2.72 10*3/mm3      Lymphocytes, Absolute 0.45 (L)  10*3/mm3      Monocytes, Absolute 0.74 10*3/mm3      Eosinophils, Absolute 0.04 10*3/mm3      Basophils, Absolute 0.01 10*3/mm3      Immature Grans, Absolute 0.00 10*3/mm3     POC Glucose Once [[id number]]  (Normal) Collected:  03/11/18 2014    Specimen:  Blood Updated:  03/11/18 2016     Glucose 118 mg/dL     Narrative:       Meter: [id number] : [id number] [facility]    POC Glucose Once [[id number]]  (Normal) Collected:  03/11/18 1802    Specimen:  Blood Updated:  03/11/18 1803     Glucose 111 mg/dL     Narrative:       Meter: [id number] : [id number] Gong Irma NA    POC Glucose Once [[id number]]  (Normal) Collected:  03/11/18 1653    Specimen:  Blood Updated:  03/11/18 1656     Glucose 84 mg/dL     Narrative:       Meter: [id number] : [id number] Gong Irma NA        Imaging Results (last 24 hours)     ** No results found for the last 24 hours. **        EKG:  Rate: 95  afib with IVCD, frequent PVCs,   diffuse nonspecific ST and atT wave changes, difference from. 08 2016 when he was in a narrow complex, otherwise similar..      Current Facility-Administered Medications:   •  acetaminophen (TYLENOL) tablet 650 mg, 650 mg, Oral, Q4H PRN, FRANK Moreno  •  allopurinol (ZYLOPRIM) tablet 100 mg, 100 mg, Oral, Daily, Gal Randall MD, 100 mg at 03/12/18 0825  •  bumetanide (BUMEX) tablet 1 mg, 1 mg, Oral, BID, Joseph Trinidad MD  •  FLUoxetine (PROzac) capsule 20 mg, 20 mg, Oral, Daily, FRANK Moreno, 20 mg at 03/12/18 0827  •  folic acid (FOLVITE) tablet 1 mg, 1 mg, Oral, Daily, Gal Randall MD, 1 mg at 03/12/18 0825  •  insulin aspart (novoLOG) injection 0-9 Units, 0-9 Units, Subcutaneous, 4x Daily With Meals & Nightly, Soniya Thomas MD  •  ipratropium-albuterol (DUO-NEB) nebulizer solution 3 mL, 3 mL, Nebulization, Q4H - RT, Galmarline Randall MD, 3 mL at 03/12/18 1415  •  metoprolol tartrate (LOPRESSOR) tablet 25 mg, 25 mg, Oral, Q12H, FRANK Moreno, 25 mg at 03/12/18 0893  •   multivitamin (THERAGRAN) tablet 1 tablet, 1 tablet, Oral, Daily, Victor Manuel Randall MD, 1 tablet at 03/12/18 0825  •  neomycin-bacitracin-polymyxin (NEOSPORIN) ointment 1 application, 1 application, Topical, Daily, Victor Manuel Randall MD, 1 application at 03/11/18 0808  •  pantoprazole (PROTONIX) EC tablet 40 mg, 40 mg, Oral, Daily, FRANK Moreno, 40 mg at 03/12/18 0826  •  potassium chloride (MICRO-K) CR capsule 40 mEq, 40 mEq, Oral, PRN, Epifanio May MD, 40 mEq at 03/12/18 0620  •  sodium chloride 0.9 % flush 1-10 mL, 1-10 mL, Intravenous, PRN, FRANK Moreno  •  thiamine (VITAMIN B-1) tablet 100 mg, 100 mg, Oral, Daily, Victor Manuel Randall MD, 100 mg at 03/12/18 0825     ASSESSMENT  Decompensated CHF  Anasarca  Volume overload  Cirrhosis with ascites status post paracentesis  Hypertension  Alcohol abuse  Pancytopenia secondary to alcohol abuse  Elevated troponin with known coronary artery disease  Prostate cancer  Depression  Paroxysmal atrial fibrillation on ELIQUIS    PLAN  CPM  Diuresis per nephrology  Continue home medication and adjust the doses  Detox with alcohol withdrawal precautions  Supportive care  Stress ulcer prophylaxis  Discharge once okay with all    VICTOR MANUEL RANDALL MD

## 2018-03-12 NOTE — PLAN OF CARE
Problem: Patient Care Overview  Goal: Plan of Care Review  Outcome: Ongoing (interventions implemented as appropriate)   03/12/18 6590   Coping/Psychosocial   Plan of Care Reviewed With patient   OTHER   Outcome Summary Pt agreeble to work with PT today. C/O diziness and SOB with ambulation. Nursing notified and will assess bedside. Note generalized deconditioning. Requires use of rwx for stability. Would benefit from subacute rehab or HH PT at d/c( if help is available at home initially).

## 2018-03-12 NOTE — PROGRESS NOTES
[facility]  Inpatient Progress Note    Reason for Follow Up:  Cirrhosis, ascites    Subjective     Interval History:   Just back from ambulating the halls with PT.  Using a walker.  Short of breath.  Abdomen feels more distended.  Reports eating well.    Current Facility-Administered Medications:   •  acetaminophen (TYLENOL) tablet 650 mg, 650 mg, Oral, Q4H PRN, Zakiya Valverde, APRN  •  allopurinol (ZYLOPRIM) tablet 100 mg, 100 mg, Oral, Daily, Gal Randall MD, 100 mg at 03/12/18 0825  •  apixaban (ELIQUIS) tablet 2.5 mg, 2.5 mg, Oral, Q12H, Gal Randall MD, 2.5 mg at 03/12/18 0825  •  FLUoxetine (PROzac) capsule 20 mg, 20 mg, Oral, Daily, Zakiya Valverde APRN, 20 mg at 03/12/18 0827  •  folic acid (FOLVITE) tablet 1 mg, 1 mg, Oral, Daily, Gal Randall MD, 1 mg at 03/12/18 0825  •  insulin aspart (novoLOG) injection 0-9 Units, 0-9 Units, Subcutaneous, 4x Daily With Meals & Nightly, Jawed MD William  •  ipratropium-albuterol (DUO-NEB) nebulizer solution 3 mL, 3 mL, Nebulization, Q4H - RT, Gal Randall MD, 3 mL at 03/12/18 0644  •  metoprolol tartrate (LOPRESSOR) tablet 25 mg, 25 mg, Oral, Q12H, Zakiya Valverde APRN, 25 mg at 03/12/18 0826  •  multivitamin (THERAGRAN) tablet 1 tablet, 1 tablet, Oral, Daily, aGl Randall MD, 1 tablet at 03/12/18 0825  •  neomycin-bacitracin-polymyxin (NEOSPORIN) ointment 1 application, 1 application, Topical, Daily, Gal Randall MD, 1 application at 03/11/18 0808  •  pantoprazole (PROTONIX) EC tablet 40 mg, 40 mg, Oral, Daily, Zakiya Valverde APRN, 40 mg at 03/12/18 0826  •  potassium chloride (MICRO-K) CR capsule 40 mEq, 40 mEq, Oral, PRN, Epifanio May MD, 40 mEq at 03/12/18 0620  •  sodium chloride 0.9 % flush 1-10 mL, 1-10 mL, Intravenous, PRN, FRANK Moreno  •  thiamine (VITAMIN B-1) tablet 100 mg, 100 mg, Oral, Daily, Gal Randall MD, 100 mg at 03/12/18 0825  Review of Systems:    All systems were reviewed and negative except for:  Respiratory:  positive for  shortness of air  Gastrointestinal: postitive for  distension    Objective     Vital Signs  Temp:  [97.3 °F (36.3 °C)-98.3 °F (36.8 °C)] 97.3 °F (36.3 °C)  Heart Rate:  [53-71] 63  Resp:  [16-20] 18  BP: ()/(62-78) 108/72  Body mass index is 26.43 kg/m².    Intake/Output Summary (Last 24 hours) at 03/12/18 0859  Last data filed at 03/12/18 0600   Gross per 24 hour   Intake             1078 ml   Output                0 ml   Net             1078 ml     No intake/output data recorded.     Physical Exam:   General: patient awake, alert and cooperative, appears cachetic, chronically ill   Eyes: Normal lids and lashes, no scleral icterus   Neck: supple, normal ROM   Skin: warm and dry, not jaundiced   Cardiovascular: regular rhythm and rate, no murmurs auscultated   Pulm: clear to auscultation bilaterally, regular and unlabored   Abdomen:firm, diffusely mildly tender, distended; normal bowel sounds   Rectal: deferred   Extremities: no rash, 1+ edema BLE with stasis   Psychiatric: Normal mood and behavior; memory intact     Results Review:     I reviewed the patient's new clinical results.      Results from last 7 days  Lab Units 03/12/18  0448 03/11/18  0458 03/10/18  0356   WBC 10*3/mm3 3.96* 3.95* 4.18*   HEMOGLOBIN g/dL 10.1* 9.9* 10.8*   HEMATOCRIT % 31.2* 30.8* 33.3*   PLATELETS 10*3/mm3 103* 100* 118*       Results from last 7 days  Lab Units 03/12/18 0448 03/11/18 0458 03/10/18  0356 03/09/18  0412  03/08/18  0601   SODIUM mmol/L 138 141 141 139  --  141   POTASSIUM mmol/L 3.6 3.5 3.8 3.7  < > 4.2   CHLORIDE mmol/L 98 97* 96* 92*  --  96*   CO2 mmol/L 28.3 29.6* 28.6 29.0  --  32.5*   BUN mg/dL 38* 21 36* 32*  --  27*   CREATININE mg/dL 2.08* 2.48* 2.18* 1.78*  --  1.38*   CALCIUM mg/dL 8.2* 6.9* 7.4* 8.2*  --  8.2*   BILIRUBIN mg/dL 1.1  --   --  1.3*  --  1.4*   ALK PHOS U/L 71  --   --  99  --  91   ALT (SGPT) U/L 15  --   --  14  --  8   AST (SGOT) U/L 24  --   --  31  --  20   GLUCOSE  mg/dL 98 120* 111* 100*  --  93   < > = values in this interval not displayed.    Results from last 7 days  Lab Units 03/12/18  0448 03/09/18  0703 03/07/18  1639   INR  1.64* 1.86* 1.31*     No results found for: LIPASE    Radiology:  US Paracentesis   Final Result      XR Chest 2 View   Final Result   Borderline cardiomegaly and small left pleural effusion.       This report was finalized on 3/8/2018 7:56 AM by Dr. Zachery Ngo MD.          CT Abdomen Pelvis Without Contrast   Final Result   1. Small left pleural effusion.   2. Large amount of ascites.   3. Liver appears somewhat small. Please correlate for cirrhosis.       Radiation dose reduction techniques were utilized, including automated   exposure control and exposure modulation based on body size.       This report was finalized on 3/8/2018 7:56 AM by Dr. Zachery Ngo MD.              Assessment/Plan     Patient Active Problem List   Diagnosis   • CHF exacerbation   • Right heart failure   • Ascites of liver   • Hypertension   • Atrial fibrillation   • Alcoholism   • History of coronary angioplasty   • DM2 (diabetes mellitus, type 2)   • Pancytopenia   • Chronic liver disease   • Elevated troponin   • CKD (chronic kidney disease)   • Hypomagnesemia   • Hypokalemia       Impression  1. Cirrhosis:most likely alcoholic complicated by cardiac cirrhosis as per ascites fluid studies.  Also could be component of nephrogenic  2. Ascites: large volume paracentesis 3/9, reaccumulating.  High protein, SAAG of 1.  No SBP  3. Alcohol abuse: ongoing prior to admission  4. CHF: cardiology following  5. CKD: stage III, diuretics on hold  6. A fib: on eliquis    Plan  -repeat paracentesis, will check all fluid studies again  -continue 6 small low sodium meals  -outpt EGD for variceal surveillance  -will defer diuretics to renal  -agree with structured outpt alcohol program    I discussed the patients findings and my recommendations with patient and nursing  staff.    Marlen Benavides MD

## 2018-03-12 NOTE — THERAPY DISCHARGE NOTE
Acute Care - Occupational Therapy Treatment Note/Discharge  [facility]     Patient Name: Anibal Freedman  : 1950  MRN: [id number]  Today's Date: 3/12/2018  Onset of Illness/Injury or Date of Surgery Date: 18  Date of Referral to OT: 18         Admit Date: 3/7/2018    Visit Dx:     ICD-10-CM ICD-9-CM   1. New onset ascites of liver R18.8 789.59   2. Acute on chronic congestive heart failure, unspecified congestive heart failure type I50.9 428.0   3. Elevated troponin R74.8 790.6   4. Coagulopathy on Eliquis D68.9 286.9   5. Atrial fibrillation, unspecified type I48.91 427.31   6. Gait abnormality R26.9 781.2     Patient Active Problem List   Diagnosis   • CHF exacerbation   • Right heart failure   • Ascites of liver   • Hypertension   • Atrial fibrillation   • Alcoholism   • History of coronary angioplasty   • DM2 (diabetes mellitus, type 2)   • Pancytopenia   • Chronic liver disease   • Elevated troponin   • CKD (chronic kidney disease)   • Hypomagnesemia   • Hypokalemia       Therapy Treatment    Therapy Treatment / Health Promotion    Treatment Time/Intention  Discipline: occupational therapist  Document Type: discharge evaluation/summary  Subjective Information: no complaints  Mode of Treatment: individual therapy  Care Plan Review: care plan/treatment goals reviewed  Plan of Care Review  Plan of Care Reviewed With: patient    Vitals/Pain/Safety  Positioning and Restraints  Pre-Treatment Position: sitting in chair/recliner  Post Treatment Position: chair  In Chair: sitting, call light within reach, encouraged to call for assist    Mobility,ADL,Motor, Modality  Bed Mobility Assessment/Treatment  Comment (Bed Mobility): pt up in chair  Transfer Assessment/Treatment  Transfer Assessment/Treatment:  (pt already up to JOSSELYN LLAMAS )  Toileting Assessment/Training  Comment (Toileting): pt completed toileting already and is independent. but needs rest breaks              ROM/MMT             Sensory,  Edema, Orthotics          Cognition, Communication, Swallow  Cognitive Assessment Intervention- SLP  Cognitive Function (Cognition): WNL  Orientation Status (Cognition): person, place, time, situation    Outcome Summary           Occupational Therapy Education     Title: PT OT SLP Therapies (Active)     Topic: Occupational Therapy (Resolved)     Point: ADL training (Resolved)     Description: Instruct learner(s) on proper safety adaptation and remediation techniques during self care or transfers.   Instruct in proper use of assistive devices.   Learning Progress Summary     Learner Status Readiness Method Response Comment Documented by    Patient Done ALEXANDRO Nelson D VU, DU ed pt on ECT for bathing, dressing at home. ed on use of shower seat at home and to take breaks during bathing and dressing. Ed to shower when his wife is home for safety reasons. pt verbally understands and d/c OT , not further OT needs  03/12/18 1055     Done Davie CONLEY  SO 03/09/18 1211          Point: Home exercise program (Resolved)     Description: Instruct learner(s) on appropriate technique for monitoring, assisting and/or progressing therapeutic exercises/activities.   Learning Progress Summary     Learner Status Readiness Method Response Comment Documented by    Patient Done ALEXANDRO Nelson D VU,MARYBETH ed pt on ECT for bathing, dressing at home. ed on use of shower seat at home and to take breaks during bathing and dressing. Ed to shower when his wife is home for safety reasons. pt verbally understands and d/c OT , not further OT needs  03/12/18 1055          Point: Precautions (Resolved)     Description: Instruct learner(s) on prescribed precautions during self-care and functional transfers.   Learning Progress Summary     Learner Status Readiness Method Response Comment Documented by    Patient Done ALEXANDRO Nelson D VU, DU ed pt on ECT for bathing, dressing at home. ed on use of shower seat at home and to take breaks during bathing and  dressing. Ed to shower when his wife is home for safety reasons. pt verbally understands and d/c OT , not further OT needs  03/12/18 1055          Point: Body mechanics (Resolved)     Description: Instruct learner(s) on proper positioning and spine alignment during self-care, functional mobility activities and/or exercises.   Learning Progress Summary     Learner Status Readiness Method Response Comment Documented by    Patient Done Junie E,TB,D VU,DU ed pt on ECT for bathing, dressing at home. ed on use of shower seat at home and to take breaks during bathing and dressing. Ed to shower when his wife is home for safety reasons. pt verbally understands and d/c OT , not further OT needs  03/12/18 1055                      User Key     Initials Effective Dates Name Provider Type Discipline    SO 04/13/15 -  Jackie Brian, OTR Occupational Therapist OT     04/13/15 -  Lili Mccullough, OTR Occupational Therapist OT                OT Recommendation and Plan  Anticipated Discharge Disposition: home with home health, home with assist  Therapy Frequency: 3-5 times/wk             Outcome Measures     Row Name 03/12/18 1057 03/09/18 1229 03/09/18 1200       How much help from another person do you currently need...    Turning from your back to your side while in flat bed without using bedrails?  -- 3  -JK  --    Moving from lying on back to sitting on the side of a flat bed without bedrails?  -- 3  -JK  --    Moving to and from a bed to a chair (including a wheelchair)?  -- 3  -JK  --    Standing up from a chair using your arms (e.g., wheelchair, bedside chair)?  -- 3  -JK  --    Climbing 3-5 steps with a railing?  -- 2  -JK  --    To walk in hospital room?  -- 3  -JK  --    AM-PAC 6 Clicks Score  -- 17  -JK  --       How much help from another is currently needed...    Putting on and taking off regular lower body clothing? 4  -KP  -- 2  -SO    Bathing (including washing, rinsing, and drying) 4  -KP  -- 3   -SO    Toileting (which includes using toilet bed pan or urinal) 4  -KP  -- 3  -SO    Putting on and taking off regular upper body clothing 4  -KP  -- 3  -SO    Taking care of personal grooming (such as brushing teeth) 4  -KP  -- 3  -SO    Eating meals 4  -KP  -- 3  -SO    Score 24  -KP  -- 17  -SO       Functional Assessment    Outcome Measure Options AM-PAC 6 Clicks Daily Activity (OT)  -KP AM-PAC 6 Clicks Basic Mobility (PT)  -K AM-PAC 6 Clicks Daily Activity (OT)  -SO      User Key  (r) = Recorded By, (t) = Taken By, (c) = Cosigned By    Initials Name Provider Type    SO Jackie Brian, OTR Occupational Therapist    LUIS EDUARDO Mccullough OTR Occupational Therapist    RADHA Gomez, PT Physical Therapist           Time Calculation:          Time Calculation- OT     Row Name 03/12/18 1059             Time Calculation- OT    OT Start Time 0937  -      OT Stop Time 0947  -      OT Time Calculation (min) 10 min  -      OT Received On 03/12/18  -        User Key  (r) = Recorded By, (t) = Taken By, (c) = Cosigned By    Initials Name Provider Type    LUIS EDUARDO Mccullough OTR Occupational Therapist          Therapy Charges for Today     Code Description Service Date Service Provider Modifiers Qty    [id number]  OT SELF CARE/MGMT/TRAIN EA 15 MIN 3/12/2018 KENNETH Amaya GO 1                    KENNETH Amaya  3/12/2018

## 2018-03-12 NOTE — THERAPY TREATMENT NOTE
Acute Care - Physical Therapy Treatment Note  [facility]     Patient Name: Anibal Freedman  : 1950  MRN: [id number]    Today's Date: 3/12/2018       Date of Referral to PT: 18         Admit Date: 3/7/2018      Visit Dx:      ICD-10-CM ICD-9-CM   1. New onset ascites of liver R18.8 789.59   2. Acute on chronic congestive heart failure, unspecified congestive heart failure type I50.9 428.0   3. Elevated troponin R74.8 790.6   4. Coagulopathy on Eliquis D68.9 286.9   5. Atrial fibrillation, unspecified type I48.91 427.31   6. Gait abnormality R26.9 781.2       Patient Active Problem List   Diagnosis   • CHF exacerbation   • Right heart failure   • Ascites of liver   • Hypertension   • Atrial fibrillation   • Alcoholism   • History of coronary angioplasty   • DM2 (diabetes mellitus, type 2)   • Pancytopenia   • Chronic liver disease   • Elevated troponin   • CKD (chronic kidney disease)   • Hypomagnesemia   • Hypokalemia       Therapy Treatment    Evaluation/Coping    Evaluation/Treatment Time and Intent  Document Type: therapy note (daily note)  Mode of Treatment: individual therapy, physical therapy  Patient/Family Observations: resting comfortably in chair in room.    Vitals/Pain/Safety    Vital Signs  Pretreatment Heart Rate (beats/min): 66  Posttreatment Heart Rate (beats/min): 80  Post SpO2 (%):  (Pt report being SOA - RN notified as monitor not working.)  Pre Patient Position: Sitting  Post Patient Position: Sitting  Pain Scale: Numbers Pre/Post-Treatment  Pain Scale: Numbers, Pretreatment: 0/10 - no pain  Pain Scale: Numbers, Post-Treatment: 0/10 - no pain  Positioning and Restraints  Pre-Treatment Position: sitting in chair/recliner  Post Treatment Position: chair  In Chair: sitting, call light within reach, encouraged to call for assist (with MD, RN to check O2 sats)    Cognition/Communication    Cognitive Assessment Intervention- SLP  Cognitive Function (Cognition): WFL  Orientation Status  (Cognition): WNL  Speaking Valve  Pretreatment Heart Rate (beats/min): 66  Posttreatment Heart Rate (beats/min): 80  Post SpO2 (%):  (Pt report being SOA - RN notified as monitor not working.)    Oral Motor/Eating    General Eating/Swallowing Observations  Post SpO2 (%):  (Pt report being SOA - RN notified as monitor not working.)    Mobility/Basic Activities/Instrumental Activities/Motor/Modality    Bed Mobility Assessment/Treatment  Comment (Bed Mobility): pt up in chair  Sit-Stand Transfer  Sit-Stand [place] (Transfers): stand by assist  Stand-Sit Transfer  Stand-Sit [place] (Transfers): stand by assist  Gait/Stairs Assessment/Training  [place] Level (Gait): contact guard, verbal cues  Assistive Device (Gait): walker, front-wheeled  Distance in Feet (Gait): 100  Pattern (Gait): step-through  Deviations/Abnormal Patterns (Gait): base of support, narrow, gait speed decreased (trunk flexed)  Bilateral Gait Deviations: heel strike decreased  [place] Level (Stairs): contact guard, verbal cues  Assistive Device (Stairs): crutches, forearm  Handrail Location (Stairs): both sides  Number of Steps (Stairs): 8  Ascending Technique (Stairs): step-to-step  Descending Technique (Stairs): step-to-step  Stairs, Safety Issues: balance decreased during turns  Stairs, Impairments: strength decreased, impaired balance (dec endurance)              ROM/MMT                   Sensory/Myotome/Dermatome/Edema               Posture/Balance/Special Tests/Exercise/Transportation/Sexual Function                   Orthotics/Residual Limb/Prosthetic Management              Outcome Summary                 PT Recommendation and Plan    Anticipated Discharge Disposition (PT): home with home health care, inpatient rehab facility    Plan of Care Reviewed With: patient                     Outcome Measures     Row Name 03/12/18 1200 03/12/18 1057          How much help from another person do you currently need...    Turning from  your back to your side while in flat bed without using bedrails? 4  -KP  --     Moving from lying on back to sitting on the side of a flat bed without bedrails? 4  -KP  --     Moving to and from a bed to a chair (including a wheelchair)? 3  -KP  --     Standing up from a chair using your arms (e.g., wheelchair, bedside chair)? 3  -KP  --     Climbing 3-5 steps with a railing? 3  -KP  --     To walk in hospital room? 3  -KP  --     AM-PAC 6 Clicks Score 20  -KP  --        How much help from another is currently needed...    Putting on and taking off regular lower body clothing?  -- 4  -KPA     Bathing (including washing, rinsing, and drying)  -- 4  -KPA     Toileting (which includes using toilet bed pan or urinal)  -- 4  -KPA     Putting on and taking off regular upper body clothing  -- 4  -KPA     Taking care of personal grooming (such as brushing teeth)  -- 4  -KPA     Eating meals  -- 4  -KPA     Score  -- 24  -KPA        Functional Assessment    Outcome Measure Options  -- AM-PAC 6 Clicks Daily Activity (OT)  -KPA       User Key  (r) = Recorded By, (t) = Taken By, (c) = Cosigned By    Initials Name Provider Type    [facility] Lili Mccullough, OTR Occupational Therapist     Anabel Renae, PT Physical Therapist             Time Calculation:           PT Charges     Row Name 03/12/18 1230             Time Calculation    Start Time 0840  -      Stop Time 0900  -      Time Calculation (min) 20 min  -      PT Received On 03/12/18  -      PT - Next Appointment 03/13/18  -      PT Goal Re-Cert Due Date 03/16/18  -        User Key  (r) = Recorded By, (t) = Taken By, (c) = Cosigned By    Initials Name Provider Type     Anabel Renae PT Physical Therapist            Therapy Charges for Today     Code Description Service Date Service Provider Modifiers Qty    20154363473 HC PT THER PROC EA 15 MIN 3/12/2018 Anabel Renae, PT GP 1            PT G-Codes  Outcome Measure Options: AM-PAC 6 Clicks Daily  Activity (OT)      Anabel Renae, PT  3/12/2018

## 2018-03-12 NOTE — PLAN OF CARE
Problem: Fall Risk (Adult)  Goal: Identify Related Risk Factors and Signs and Symptoms  Outcome: Ongoing (interventions implemented as appropriate)    Goal: Absence of Fall  Outcome: Ongoing (interventions implemented as appropriate)    Goal: Identify Related Risk Factors and Signs and Symptoms  Outcome: Ongoing (interventions implemented as appropriate)    Goal: Absence of Fall  Outcome: Ongoing (interventions implemented as appropriate)      Problem: Cardiac: Heart Failure (Adult)  Goal: Signs and Symptoms of Listed Potential Problems Will be Absent, Minimized or Managed (Cardiac: Heart Failure)  Outcome: Ongoing (interventions implemented as appropriate)      Problem: Alcohol Withdrawal Acute, Risk/Actual (Adult)  Goal: Signs and Symptoms of Listed Potential Problems Will be Absent, Minimized or Managed (Alcohol Withdrawal Acute, Risk/Actual)  Outcome: Ongoing (interventions implemented as appropriate)      Problem: Skin Injury Risk (Adult)  Goal: Identify Related Risk Factors and Signs and Symptoms  Outcome: Ongoing (interventions implemented as appropriate)    Goal: Skin Health and Integrity  Outcome: Ongoing (interventions implemented as appropriate)      Problem: Patient Care Overview  Goal: Plan of Care Review  Outcome: Ongoing (interventions implemented as appropriate)    Goal: Individualization and Mutuality  Outcome: Ongoing (interventions implemented as appropriate)    Goal: Discharge Needs Assessment  Outcome: Ongoing (interventions implemented as appropriate)    Goal: Interprofessional Rounds/Family Conf  Outcome: Ongoing (interventions implemented as appropriate)

## 2018-03-13 ENCOUNTER — APPOINTMENT (OUTPATIENT)
Dept: ULTRASOUND IMAGING | Facility: HOSPITAL | Age: 68
End: 2018-03-13
Attending: INTERNAL MEDICINE

## 2018-03-13 LAB
ALBUMIN FLD-MCNC: 1.3 G/DL
ANION GAP SERPL CALCULATED.3IONS-SCNC: 11 MMOL/L
APPEARANCE FLD: ABNORMAL
BASOPHILS # BLD AUTO: 0.01 10*3/MM3 (ref 0–0.2)
BASOPHILS NFR BLD AUTO: 0.2 % (ref 0–1.5)
BUN BLD-MCNC: 38 MG/DL (ref 8–23)
BUN/CREAT SERPL: 20.9 (ref 7–25)
CALCIUM SPEC-SCNC: 7.8 MG/DL (ref 8.6–10.5)
CHLORIDE SERPL-SCNC: 96 MMOL/L (ref 98–107)
CO2 SERPL-SCNC: 30 MMOL/L (ref 22–29)
COLOR FLD: YELLOW
CREAT BLD-MCNC: 1.82 MG/DL (ref 0.76–1.27)
DEPRECATED RDW RBC AUTO: 51.5 FL (ref 37–54)
EOSINOPHIL # BLD AUTO: 0.04 10*3/MM3 (ref 0–0.7)
EOSINOPHIL NFR BLD AUTO: 0.9 % (ref 0.3–6.2)
ERYTHROCYTE [DISTWIDTH] IN BLOOD BY AUTOMATED COUNT: 14.4 % (ref 11.5–14.5)
FOLATE SERPL-MCNC: 13.79 NG/ML (ref 4.78–24.2)
GFR SERPL CREATININE-BSD FRML MDRD: 37 ML/MIN/1.73
GLUCOSE BLD-MCNC: 96 MG/DL (ref 65–99)
GLUCOSE BLDC GLUCOMTR-MCNC: 102 MG/DL (ref 70–130)
GLUCOSE BLDC GLUCOMTR-MCNC: 109 MG/DL (ref 70–130)
GLUCOSE BLDC GLUCOMTR-MCNC: 92 MG/DL (ref 70–130)
HCT VFR BLD AUTO: 32.1 % (ref 40.4–52.2)
HGB BLD-MCNC: 10.3 G/DL (ref 13.7–17.6)
IMM GRANULOCYTES # BLD: 0 10*3/MM3 (ref 0–0.03)
IMM GRANULOCYTES NFR BLD: 0 % (ref 0–0.5)
LYMPHOCYTES # BLD AUTO: 0.45 10*3/MM3 (ref 0.9–4.8)
LYMPHOCYTES NFR BLD AUTO: 10.4 % (ref 19.6–45.3)
LYMPHOCYTES NFR FLD MANUAL: 81 %
MCH RBC QN AUTO: 31.5 PG (ref 27–32.7)
MCHC RBC AUTO-ENTMCNC: 32.1 G/DL (ref 32.6–36.4)
MCV RBC AUTO: 98.2 FL (ref 79.8–96.2)
MONOCYTES # BLD AUTO: 0.73 10*3/MM3 (ref 0.2–1.2)
MONOCYTES NFR BLD AUTO: 16.9 % (ref 5–12)
MONOCYTES NFR FLD: 9 %
NEUTROPHILS # BLD AUTO: 3.08 10*3/MM3 (ref 1.9–8.1)
NEUTROPHILS NFR BLD AUTO: 71.6 % (ref 42.7–76)
NEUTROPHILS NFR FLD MANUAL: 10 %
PLATELET # BLD AUTO: 119 10*3/MM3 (ref 140–500)
PMV BLD AUTO: 12.8 FL (ref 6–12)
POTASSIUM BLD-SCNC: 4 MMOL/L (ref 3.5–5.2)
PROT FLD-MCNC: 2.2 G/DL
RBC # BLD AUTO: 3.27 10*6/MM3 (ref 4.6–6)
RBC # FLD AUTO: ABNORMAL /MM3
SODIUM BLD-SCNC: 137 MMOL/L (ref 136–145)
WBC # FLD: 272 /MM3
WBC NRBC COR # BLD: 4.31 10*3/MM3 (ref 4.5–10.7)

## 2018-03-13 PROCEDURE — 87015 SPECIMEN INFECT AGNT CONCNTJ: CPT | Performed by: INTERNAL MEDICINE

## 2018-03-13 PROCEDURE — 84157 ASSAY OF PROTEIN OTHER: CPT | Performed by: INTERNAL MEDICINE

## 2018-03-13 PROCEDURE — 87070 CULTURE OTHR SPECIMN AEROBIC: CPT | Performed by: INTERNAL MEDICINE

## 2018-03-13 PROCEDURE — 82042 OTHER SOURCE ALBUMIN QUAN EA: CPT | Performed by: INTERNAL MEDICINE

## 2018-03-13 PROCEDURE — 89051 BODY FLUID CELL COUNT: CPT | Performed by: INTERNAL MEDICINE

## 2018-03-13 PROCEDURE — P9046 ALBUMIN (HUMAN), 25%, 20 ML: HCPCS | Performed by: INTERNAL MEDICINE

## 2018-03-13 PROCEDURE — 99232 SBSQ HOSP IP/OBS MODERATE 35: CPT | Performed by: NURSE PRACTITIONER

## 2018-03-13 PROCEDURE — 87205 SMEAR GRAM STAIN: CPT | Performed by: INTERNAL MEDICINE

## 2018-03-13 PROCEDURE — 99232 SBSQ HOSP IP/OBS MODERATE 35: CPT | Performed by: INTERNAL MEDICINE

## 2018-03-13 PROCEDURE — 82746 ASSAY OF FOLIC ACID SERUM: CPT | Performed by: INTERNAL MEDICINE

## 2018-03-13 PROCEDURE — 82962 GLUCOSE BLOOD TEST: CPT

## 2018-03-13 PROCEDURE — 80048 BASIC METABOLIC PNL TOTAL CA: CPT | Performed by: NURSE PRACTITIONER

## 2018-03-13 PROCEDURE — 76942 ECHO GUIDE FOR BIOPSY: CPT

## 2018-03-13 PROCEDURE — 85025 COMPLETE CBC W/AUTO DIFF WBC: CPT | Performed by: INTERNAL MEDICINE

## 2018-03-13 PROCEDURE — 25010000002 ALBUMIN HUMAN 25% PER 50 ML: Performed by: INTERNAL MEDICINE

## 2018-03-13 RX ORDER — ALBUMIN (HUMAN) 12.5 G/50ML
75 SOLUTION INTRAVENOUS ONCE
Status: COMPLETED | OUTPATIENT
Start: 2018-03-13 | End: 2018-03-13

## 2018-03-13 RX ORDER — LIDOCAINE HYDROCHLORIDE 10 MG/ML
20 INJECTION, SOLUTION INFILTRATION; PERINEURAL ONCE
Status: COMPLETED | OUTPATIENT
Start: 2018-03-13 | End: 2018-03-13

## 2018-03-13 RX ORDER — MIDODRINE HYDROCHLORIDE 5 MG/1
5 TABLET ORAL
Status: DISCONTINUED | OUTPATIENT
Start: 2018-03-13 | End: 2018-03-14 | Stop reason: HOSPADM

## 2018-03-13 RX ORDER — ALBUMIN (HUMAN) 12.5 G/50ML
25 SOLUTION INTRAVENOUS ONCE
Status: DISCONTINUED | OUTPATIENT
Start: 2018-03-13 | End: 2018-03-14 | Stop reason: HOSPADM

## 2018-03-13 RX ADMIN — FOLIC ACID 1 MG: 1 TABLET ORAL at 09:48

## 2018-03-13 RX ADMIN — APIXABAN 5 MG: 5 TABLET, FILM COATED ORAL at 22:59

## 2018-03-13 RX ADMIN — MIDODRINE HYDROCHLORIDE 5 MG: 5 TABLET ORAL at 22:59

## 2018-03-13 RX ADMIN — ALBUMIN HUMAN 75 G: 0.25 SOLUTION INTRAVENOUS at 15:23

## 2018-03-13 RX ADMIN — FLUOXETINE HYDROCHLORIDE 20 MG: 20 CAPSULE ORAL at 09:48

## 2018-03-13 RX ADMIN — BACITRACIN ZINC NEOMYCIN SULFATE POLYMYXIN B SULFATE 1 APPLICATION: 400; 3.5; 5 OINTMENT TOPICAL at 09:54

## 2018-03-13 RX ADMIN — METOPROLOL TARTRATE 25 MG: 25 TABLET ORAL at 22:59

## 2018-03-13 RX ADMIN — Medication 100 MG: at 09:48

## 2018-03-13 RX ADMIN — BUMETANIDE 1 MG: 1 TABLET ORAL at 22:59

## 2018-03-13 RX ADMIN — PANTOPRAZOLE SODIUM 40 MG: 40 TABLET, DELAYED RELEASE ORAL at 09:48

## 2018-03-13 RX ADMIN — Medication 1 TABLET: at 09:48

## 2018-03-13 RX ADMIN — ALLOPURINOL 100 MG: 100 TABLET ORAL at 09:48

## 2018-03-13 RX ADMIN — LIDOCAINE HYDROCHLORIDE 18 ML: 10 INJECTION, SOLUTION INFILTRATION; PERINEURAL at 07:50

## 2018-03-13 NOTE — PROGRESS NOTES
REASON FOR FOLLOWUP/CHIEF COMPLAINT:  Cytopenias    HISTORY OF PRESENT ILLNESS:   No new issues.  Noted plans for discharge.  No complaints of bleeding.    Past Medical History, Past Surgical History, Social History, Family History have been reviewed and are without significant changes except as mentioned.    Review of Systems   Review of Systems   Constitutional: Negative for activity change.   HENT: Negative for nosebleeds and trouble swallowing.    Respiratory: Negative for shortness of breath and wheezing.    Cardiovascular: Negative for chest pain and palpitations.   Gastrointestinal: Negative for constipation, diarrhea and nausea.   Genitourinary: Negative for dysuria and hematuria.   Musculoskeletal: Negative for arthralgias and myalgias.   Neurological: Negative for seizures and syncope.   Hematological: Negative for adenopathy. Does not bruise/bleed easily.   Psychiatric/Behavioral: Negative for confusion.       Medications:  The current medication list was reviewed in the EMR    ALLERGIES:  No Known Allergies           Vitals:    03/13/18 0841 03/13/18 0912 03/13/18 0954 03/13/18 1137   BP: 99/68 109/80 90/67 100/71   BP Location: Left arm Left arm  Right arm   Patient Position: Lying Lying  Sitting   Pulse: 59 55  60   Resp: 16      Temp:  97.7 °F (36.5 °C)  97.4 °F (36.3 °C)   TempSrc:  Oral  Oral   SpO2: 100% 100%  99%   Weight:       Height:         Physical Exam    CONSTITUTIONAL:  Vital signs reviewed.  No distress, looks comfortable.  EYES:  Conjunctiva and lids unremarkable.  PERRLA  EARS,NOSE,MOUTH,THROAT:  Ears and nose appear unremarkable.  Lips, teeth, gums appear unremarkable.  RESPIRATORY:  Normal respiratory effort.  Lungs clear to auscultation bilaterally.  CARDIOVASCULAR:  Normal S1, S2.  No murmurs rubs or gallops.  No significant lower extremity edema.  GASTROINTESTINAL: Abdomen appears unremarkable.  Nontender.  No hepatomegaly.  No splenomegaly.  NEURO: cranial nerves 2-12  grossly intact.  No focal deficits.  Appears to have equal strength all 4 extremities.  MUSCULOSKELETAL:  Unremarkable digits/nails.  No cyanosis or clubbing.  SKIN:  Warm.  No rashes.  PSYCHIATRIC:  Normal judgment and insight.  Normal mood and affect.            RECENT LABS:  WBC   Date Value Ref Range Status   03/13/2018 4.31 (L) 4.50 - 10.70 10*3/mm3 Final   03/12/2018 3.96 (L) 4.50 - 10.70 10*3/mm3 Final   03/11/2018 3.95 (L) 4.50 - 10.70 10*3/mm3 Final     Hemoglobin   Date Value Ref Range Status   03/13/2018 10.3 (L) 13.7 - 17.6 g/dL Final   03/12/2018 10.1 (L) 13.7 - 17.6 g/dL Final   03/11/2018 9.9 (L) 13.7 - 17.6 g/dL Final     Platelets   Date Value Ref Range Status   03/13/2018 119 (L) 140 - 500 10*3/mm3 Final   03/12/2018 103 (L) 140 - 500 10*3/mm3 Final   03/11/2018 100 (L) 140 - 500 10*3/mm3 Final                 ASSESSMENT/PLAN:  *Leukocytopenia.  Mild.  Monitor.    *Anemia.  Hb stable around 10.    *Thrombocytopenia.  PLT stable around 100.  Up to 119 today.    *Etiologies of cytopenias: Suspect due to cirrhosis.  B12 and folate checked and unremarkable.      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  Suspect this is the reason for cytopenias.    *Ascites with 20 L removed 3/9/18.    Plan   I don't think I'm adding much at this point.  Will sign off.  Please call if I can be of any further assistance.  Thanks for the opportunity to help.    No problems from my standpoint with discharge.   I do not think he needs follow-up in our office.  He can follow up with his PCP.

## 2018-03-13 NOTE — NURSING NOTE
Pt seen, he has his resources, states his wife is helping him. Pt probably going to physical rehab first.

## 2018-03-13 NOTE — DISCHARGE PLACEMENT REQUEST
"Abisai Marks (68 y.o. Male)     Date of Birth Social Security Number Address Home Phone MRN    1950  [id number] [id number] [phone] [id number]    Church Marital Status          Scientologist        Admission Date Admission Type Admitting Provider Attending Provider Department, Room/Bed    3/7/18 Emergency Gal Randall MD Chandiramani, Shanker, MD [address]    Discharge Date Discharge Disposition Discharge Destination                       Attending Provider:  Epifanio May MD    Allergies:  No Known Allergies    Isolation:  None   Infection:  None   Code Status:  FULL    Ht:  180.3 cm (71\")   Wt:  87.1 kg (192 lb)    Admission Cmt:  None   Principal Problem:  CHF exacerbation [I50.9]                 Active Insurance as of 3/7/2018     Primary Coverage     Payor Plan Insurance Group Employer/Plan Group    MEDICARE [place] MEDICARE      Payor Plan Address Payor Plan Phone Number Effective From Effective To    [address] [phone] 2/1/2014     [place] [address]       Subscriber Name Subscriber Birth Date Member ID       ABISAI MARKS 1950 [id number]                 Emergency Contacts      (Rel.) Home Phone Work Phone Mobile Phone    Stevie Marks (Brother) [phone] -- --              "

## 2018-03-13 NOTE — PROGRESS NOTES
"   LOS: 6 days   Patient Care Team:  Moustapha Long MD as PCP - General  Moustapha Long MD as PCP - Family Medicine    Chief Complaint/ Reason for encounter: Acute renal failure/chronic kidney disease  Chief Complaint   Patient presents with   • Edema         Subjective     History of Present Illness    Subjective:  Symptoms:  Stable.  No shortness of breath or chest pain.  (No new complaints  Repeat paracentesis today  Edema improved).    Diet:  Adequate intake.  No nausea.    Activity level: Returning to normal.    Pain:  He reports no pain.          History taken from: Patient and chart    Objective     Vital Signs  Temp:  [97.4 °F (36.3 °C)-97.8 °F (36.6 °C)] 97.4 °F (36.3 °C)  Heart Rate:  [55-71] 60  Resp:  [15-20] 16  BP: ()/(42-83) 100/71       Wt Readings from Last 1 Encounters:   03/13/18 0432 87.1 kg (192 lb)   03/12/18 0500 86 kg (189 lb 8 oz)   03/11/18 0455 84.5 kg (186 lb 4 oz)   03/09/18 0348 99.8 kg (220 lb)   03/08/18 0446 99.7 kg (219 lb 12.8 oz)   03/07/18 1249 99.8 kg (220 lb)       Objective:  General Appearance:  Comfortable, well-appearing, in no acute distress and not in pain.    Vital signs: (most recent): Blood pressure 100/71, pulse 60, temperature 97.4 °F (36.3 °C), temperature source Oral, resp. rate 16, height 180.3 cm (71\"), weight 87.1 kg (192 lb), SpO2 99 %.  Vital signs are normal.  No fever.    Output: Producing urine.    HEENT: Normal HEENT exam.    Lungs:  Normal effort and normal respiratory rate.  Breath sounds clear to auscultation.  He is not in respiratory distress.  No wheezes.    Heart: Normal rate.  Regular rhythm.  S1 normal.  No murmur.   Abdomen: Abdomen is soft and non-distended.  There are signs of ascites. (Less distended).  Bowel sounds are normal.   There is no epigastric area or suprapubic area tenderness.  There is no rebound tenderness.   There is no mass.   Extremities: Normal range of motion.  There is dependent edema.  There is no deformity.  "   Pulses: Distal pulses are intact.    Neurological: Patient is alert and oriented to person, place and time.    Skin:  Warm and dry.  No rash or cyanosis.             Results Review:    Past Medical History: reviewed and updated  Past Medical History:   Diagnosis Date   • Alcoholism    • Arthritis    • Atrial fibrillation     pt reported   • Cellulitis    • CHF (congestive heart failure)    • Colon polyps 08/21/2006    Colonoscopy w/ snare cautery, polypectomy & biopsy, PATH:  Sigmoid Colon Biopsy: focal vascular congestion & evidence of recent hemorrhage, non-specific.  Recto-Sigmoid Colon Biopsy: Fragments of tubular adenoam w/ mild dysplasia. Dr. Mildred You   • DM2 (diabetes mellitus, type 2) 3/7/2018   • History of coronary angioplasty    • History of MRSA infection 2005    pt reported   • History of staph infection 08/02/2005   • Hypertension    • Infectious viral hepatitis     pt reported   • Prostate cancer 04/28/2010    S/P Radical Prostectomy, Adenocarcinoma, Primary [place] Grade 3, Secondar [place] Grade 3. Combined yissel score 6. Tumor involves rt & lt lobes, negative capsular margin, no invasion of seminal vesicles, no identified perineural invastion   • Withdrawal symptoms, alcohol          Allergies:  No Known Allergies    Intake/Output:     Intake/Output Summary (Last 24 hours) at 03/13/18 1404  Last data filed at 03/13/18 1342   Gross per 24 hour   Intake              480 ml   Output            60036 ml   Net            -9520 ml         DATA:  Radiology and Labs:  The following labs independently reviewed by me.  Interval notes, chart personally reviewed by me.   Old records independently reviewed showing stage III chronic kidney disease, baseline creatinine around 1.3        Labs:   Recent Results (from the past 24 hour(s))   POC Glucose Once    Collection Time: 03/12/18  4:25 PM   Result Value Ref Range    Glucose 93 70 - 130 mg/dL   POC Glucose Once    Collection Time: 03/12/18  9:04 PM    Result Value Ref Range    Glucose 129 70 - 130 mg/dL   Basic Metabolic Panel    Collection Time: 03/13/18  4:22 AM   Result Value Ref Range    Glucose 96 65 - 99 mg/dL    BUN 38 (H) 8 - 23 mg/dL    Creatinine 1.82 (H) 0.76 - 1.27 mg/dL    Sodium 137 136 - 145 mmol/L    Potassium 4.0 3.5 - 5.2 mmol/L    Chloride 96 (L) 98 - 107 mmol/L    CO2 30.0 (H) 22.0 - 29.0 mmol/L    Calcium 7.8 (L) 8.6 - 10.5 mg/dL    eGFR Non African Amer 37 (L) >60 mL/min/1.73    BUN/Creatinine Ratio 20.9 7.0 - 25.0    Anion Gap 11.0 mmol/L   Folate    Collection Time: 03/13/18  4:22 AM   Result Value Ref Range    Folate 13.79 4.78 - 24.20 ng/mL   CBC Auto Differential    Collection Time: 03/13/18  4:22 AM   Result Value Ref Range    WBC 4.31 (L) 4.50 - 10.70 10*3/mm3    RBC 3.27 (L) 4.60 - 6.00 10*6/mm3    Hemoglobin 10.3 (L) 13.7 - 17.6 g/dL    Hematocrit 32.1 (L) 40.4 - 52.2 %    MCV 98.2 (H) 79.8 - 96.2 fL    MCH 31.5 27.0 - 32.7 pg    MCHC 32.1 (L) 32.6 - 36.4 g/dL    RDW 14.4 11.5 - 14.5 %    RDW-SD 51.5 37.0 - 54.0 fl    MPV 12.8 (H) 6.0 - 12.0 fL    Platelets 119 (L) 140 - 500 10*3/mm3    Neutrophil % 71.6 42.7 - 76.0 %    Lymphocyte % 10.4 (L) 19.6 - 45.3 %    Monocyte % 16.9 (H) 5.0 - 12.0 %    Eosinophil % 0.9 0.3 - 6.2 %    Basophil % 0.2 0.0 - 1.5 %    Immature Grans % 0.0 0.0 - 0.5 %    Neutrophils, Absolute 3.08 1.90 - 8.10 10*3/mm3    Lymphocytes, Absolute 0.45 (L) 0.90 - 4.80 10*3/mm3    Monocytes, Absolute 0.73 0.20 - 1.20 10*3/mm3    Eosinophils, Absolute 0.04 0.00 - 0.70 10*3/mm3    Basophils, Absolute 0.01 0.00 - 0.20 10*3/mm3    Immature Grans, Absolute 0.00 0.00 - 0.03 10*3/mm3   Albumin, Fluid - Body Fluid, Peritoneum    Collection Time: 03/13/18  7:50 AM   Result Value Ref Range    Albumin, Fluid 1.30 g/dL   Protein, Body Fluid - Body Fluid, Peritoneum    Collection Time: 03/13/18  7:50 AM   Result Value Ref Range    Protein, Total, Fluid 2.2 g/dL   Body Fluid Culture - Body Fluid, Peritoneum    Collection Time:  03/13/18  7:50 AM   Result Value Ref Range    Gram Stain Result Moderate (3+) Red blood cells     Gram Stain Result Rare (1+) WBCs seen     Gram Stain Result No organisms seen    Body fluid cell count - Body Fluid,    Collection Time: 03/13/18  7:50 AM   Result Value Ref Range    Color, Fluid Yellow     Appearance, Fluid Turbid (A) Clear    WBC, Fluid 272 /mm3    RBC, Fluid 17,000 /mm3   Body fluid differential - Body Fluid,    Collection Time: 03/13/18  7:50 AM   Result Value Ref Range    Neutrophils, Fluid 10 %    Lymphocytes, Fluid 81 %    Monocytes, Fluid 9 %   POC Glucose Once    Collection Time: 03/13/18  9:15 AM   Result Value Ref Range    Glucose 92 70 - 130 mg/dL   POC Glucose Once    Collection Time: 03/13/18 12:11 PM   Result Value Ref Range    Glucose 102 70 - 130 mg/dL       Radiology:  Imaging Results (last 24 hours)     Procedure Component Value Units Date/Time    US Paracentesis [[id number]] Collected:  03/13/18 0923    Specimen:  Body Fluid Updated:  03/13/18 0927    Narrative:       US PARACENTESIS-     HISTORY: Ascites     PROCEDURE: Ultrasound guided paracentesis.     FINDINGS: Informed consent was obtained. Preliminary sonography of the  abdomen was performed. Fluid localized. The overlying skin was prepped  in the usual sterile fashion. Local anesthesia was achieved with 1%  lidocaine. A small nick was made in the skin with a scalpel. The needle  catheter device was inserted through the nick under sonographic  guidance. The needle was removed and the catheter remained and was  connected to suction. 9.35 liters of lacy fluid was withdrawn. The  patient tolerated procedure without evidence of complication and left  the procedure room in stable condition.  .    Impression:       1. Technically successful ultrasound-guided paracentesis.     This report was finalized on 3/13/2018 9:24 AM by Dr. Wilbert Roberson MD.                Medications have been reviewed:  Current Facility-Administered  Medications   Medication Dose Route Frequency Provider Last Rate Last Dose   • acetaminophen (TYLENOL) tablet 650 mg  650 mg Oral Q4H PRN FRANK Moreno       • albumin human 25 % IV SOLN 75 g  75 g Intravenous Once Marlen Benavides MD       • allopurinol (ZYLOPRIM) tablet 100 mg  100 mg Oral Daily Gal Randall MD   100 mg at 03/13/18 0948   • bumetanide (BUMEX) tablet 1 mg  1 mg Oral BID Joseph Trinidad MD   1 mg at 03/12/18 2045   • FLUoxetine (PROzac) capsule 20 mg  20 mg Oral Daily FRANK Moreno   20 mg at 03/13/18 0948   • folic acid (FOLVITE) tablet 1 mg  1 mg Oral Daily Gal Randall MD   1 mg at 03/13/18 0948   • ipratropium-albuterol (DUO-NEB) nebulizer solution 3 mL  3 mL Nebulization Q4H PRN Gal Randall MD       • metoprolol tartrate (LOPRESSOR) tablet 25 mg  25 mg Oral Q12H FRANK Moreno   25 mg at 03/12/18 2045   • multivitamin (THERAGRAN) tablet 1 tablet  1 tablet Oral Daily Gal Randall MD   1 tablet at 03/13/18 0948   • neomycin-bacitracin-polymyxin (NEOSPORIN) ointment 1 application  1 application Topical Daily Gal Randall MD   1 application at 03/13/18 0954   • pantoprazole (PROTONIX) EC tablet 40 mg  40 mg Oral Daily FRANK Moreno   40 mg at 03/13/18 0948   • potassium chloride (MICRO-K) CR capsule 40 mEq  40 mEq Oral PRN Epifanio May MD   40 mEq at 03/12/18 0620   • sodium chloride 0.9 % flush 1-10 mL  1-10 mL Intravenous PRN FRANK Moreno       • thiamine (VITAMIN B-1) tablet 100 mg  100 mg Oral Daily Gal Randall MD   100 mg at 03/13/18 0948       Assessment/Plan     Principal Problem:    CHF exacerbation  Active Problems:    Right heart failure    Ascites of liver    Hypertension    Atrial fibrillation    Alcoholism    History of coronary angioplasty    DM2 (diabetes mellitus, type 2)    Pancytopenia    Chronic liver disease    Elevated troponin    CKD (chronic kidney disease)    Hypomagnesemia    Hypokalemia      Assessment:  (Assessment:   acute renal  failure, likely from diuresis  slightly improved  Stage III chronic kidney disease, baseline creatinine 1.3  Acute exacerbation of diastolic congestive heart failure, improved with diuresis  Volume overload, improved with diuresis  Ascites from cirrhosis, recurrent, status post paracentesis  Chronic hypertension  Alcoholism, discussed cessation of alcohol  Borderline elevated troponin, possibly due to chronic kidney disease  Hypokalemia on admission, improved  Metabolic alkalosis  Hypomagnesemia, worse, replace  New hypokalemia, plantar placement        Plan:   His renal function is improved today  Continue current diuretics  Recommend albumin immediately after any future paracentesis, will order times one today  Physical therapy following, patient interested in rehabilitation  Discussed alcohol avoidance, possible rehabilitation after discharge  Should be okay for discharge tomorrow if kidney function continues to improve).             Continue to monitor renal function, electroytes and volume closely   Please call me with any questions or concerns      Joseph Trinidad MD   [facility]   [phone]    03/13/18  2:04 PM      Dictation performed using Dragon dictation software

## 2018-03-13 NOTE — PLAN OF CARE
Problem: Cardiac: Heart Failure (Adult)  Goal: Signs and Symptoms of Listed Potential Problems Will be Absent, Minimized or Managed (Cardiac: Heart Failure)  Outcome: Ongoing (interventions implemented as appropriate)      Problem: Alcohol Withdrawal Acute, Risk/Actual (Adult)  Goal: Signs and Symptoms of Listed Potential Problems Will be Absent, Minimized or Managed (Alcohol Withdrawal Acute, Risk/Actual)  Outcome: Ongoing (interventions implemented as appropriate)      Problem: Skin Injury Risk (Adult)  Goal: Identify Related Risk Factors and Signs and Symptoms  Outcome: Ongoing (interventions implemented as appropriate)    Goal: Skin Health and Integrity  Outcome: Ongoing (interventions implemented as appropriate)      Problem: Patient Care Overview  Goal: Plan of Care Review  Outcome: Ongoing (interventions implemented as appropriate)   03/13/18 0242   Coping/Psychosocial   Plan of Care Reviewed With patient   Plan of Care Review   Progress improving   OTHER   Outcome Summary Pt remains hypotensive. Remains in AFIB. Plan for paracentesis today. Will continue to monitor.      Goal: Individualization and Mutuality  Outcome: Ongoing (interventions implemented as appropriate)    Goal: Discharge Needs Assessment  Outcome: Ongoing (interventions implemented as appropriate)    Goal: Interprofessional Rounds/Family Conf  Outcome: Ongoing (interventions implemented as appropriate)

## 2018-03-13 NOTE — NURSING NOTE
Patient wife at bedside requesting information about doctors and discharge plan, asked patient if I could talk to her since she was removed from his emergency contact about this information and he stated yes. Patient concerned about discharged plans states she thinks he needs rehab.

## 2018-03-13 NOTE — PROGRESS NOTES
"Daily progress note    Chief complaint  Doing same  No specific complaints    History of present illness  68-year-old white male who is well-known to our service from multiple admissions in the past with history of chronic kidney disease stage III hypertension diastolic CHF prostate cancer coronary artery disease depression with atrial fibrillation on Eliquis presented to [facility] emergency room with lower extremity swelling and weight gain shortness of breath.  Patient evaluated in ER found to be in decompensated CHF cor pulmonale and anasarca admitted for management.  Patient also continued drink and needs detoxification.  Patient denies any chest pain fever chills cough abdominal pain vomiting diarrhea but he has been nauseated.     REVIEW OF SYSTEMS  Review of Systems   Constitutional: Negative for activity change, appetite change and fever.   HENT: Negative for congestion and sore throat.    Eyes: Negative.    Respiratory: Positive for shortness of breath (on exertion). Negative for cough.    Cardiovascular: Positive for leg swelling (BLE). Negative for chest pain.   Gastrointestinal: Positive for abdominal distention. Negative for abdominal pain, diarrhea and vomiting.   Endocrine: Negative.    Genitourinary: Negative for decreased urine volume and dysuria.   Musculoskeletal: Negative for neck pain.   Skin: Negative for rash and wound.   Allergic/Immunologic: Negative.    Neurological: Negative for weakness, numbness and headaches.   Hematological: Negative.    Psychiatric/Behavioral: Negative.    All other systems reviewed and are negative.     PHYSICAL EXAM  Blood pressure 100/71, pulse 60, temperature 97.4 °F (36.3 °C), temperature source Oral, resp. rate 16, height 180.3 cm (71\"), weight 87.1 kg (192 lb), SpO2 99 %.    Constitutional: He is oriented to person, place, and time and well-developed, well-nourished, and in no distress.   chronically ill appearring, older than stated age   Head: " Normocephalic and atraumatic.   Eyes: EOM are normal. Pupils are equal, round, and reactive to light. No scleral icterus.   Neck: Normal range of motion. Neck supple.   Cardiovascular: Normal rate and normal heart sounds.  An irregularly irregular rhythm present.   Pulmonary/Chest: Effort normal and breath sounds normal. No respiratory distress.   Crackles in the bases of the lungs   Abdominal: Soft. There is no tenderness. There is no rebound and no guarding.   Ascites severe   Musculoskeletal: Normal range of motion. He exhibits edema (2+ in BLE).   Neurological: He is alert and oriented to person, place, and time. He has normal sensation and normal strength.   Skin: Skin is warm and dry.   Psychiatric: Mood and affect normal.     LAB RESULTS  Lab Results (last 24 hours)     Procedure Component Value Units Date/Time    Body Fluid Culture - Body Fluid, Peritoneum [[id number]] Collected:  03/13/18 0750    Specimen:  Body Fluid from Peritoneum Updated:  03/13/18 1238     Gram Stain Result Moderate (3+) Red blood cells      Rare (1+) WBCs seen      No organisms seen    POC Glucose Once [[id number]]  (Normal) Collected:  03/13/18 1211    Specimen:  Blood Updated:  03/13/18 1212     Glucose 102 mg/dL     Narrative:       Meter: [id number] : [id number] Alec ARCEO    Body Fluid Cell Count With Differential - Body Fluid, Peritoneum [[id number]] Collected:  03/13/18 0750    Specimen:  Body Fluid from Peritoneum Updated:  03/13/18 0949    Narrative:       The following orders were created for panel order Body Fluid Cell Count With Differential - Body Fluid, Peritoneum.  Procedure                               Abnormality         Status                     ---------                               -----------         ------                     Body fluid cell count - ...[[id number]]  Abnormal            Final result               Body fluid differential ...[[id number]]                      Final result                  Please view results for these tests on the individual orders.    Body fluid cell count - Body Fluid, [[id number]]  (Abnormal) Collected:  03/13/18 0750    Specimen:  Body Fluid from Peritoneum Updated:  03/13/18 0949     Color, Fluid Yellow     Appearance, Fluid Turbid (A)     WBC, Fluid 272 /mm3      RBC, Fluid 17,000 /mm3     Body fluid differential - Body Fluid, [[id number]] Collected:  03/13/18 0750    Specimen:  Body Fluid from Peritoneum Updated:  03/13/18 0949     Neutrophils, Fluid 10 %      Lymphocytes, Fluid 81 %      Monocytes, Fluid 9 %     Protein, Body Fluid - Body Fluid, Peritoneum [[id number]] Collected:  03/13/18 0750    Specimen:  Body Fluid from Peritoneum Updated:  03/13/18 0923     Protein, Total, Fluid 2.2 g/dL     Narrative:       No Reference Ranges Established.    A serous fluid total fluid (TP) greater than 50 percent of the serum TP suggests the fluid is an exudate.      1. Pleural TP/Serum TP >0.5  2. Pleural LD/Serum LD >0.6  3. Pleural LD >2/3 of the upper limit of normal for serum LDH    This test was developed, it performance characteristics determined and judged suitable for clinical purposes by [facility].  It has not been cleared or approved by the FDA.  The laboratory is regulated under CLIA as qualified to perform high-complexity testing.     Albumin, Fluid - Body Fluid, Peritoneum [[id number]] Collected:  03/13/18 0750    Specimen:  Body Fluid from Peritoneum Updated:  03/13/18 0923     Albumin, Fluid 1.30 g/dL     Narrative:       No Reference Ranges Established.    A Serous fluid albumin gradient (serum albumin-fluid) <1.1 g/dL suggests the fluid is an exudate.  Cirrhosis usually results in an ascites fluid albumin gradient >1.1 g/dL.    This test was developed, its performance characteristics determined and judged suitable for clinical purposes by [facility].  It has not been cleared or approved by the FDA.  The laboratory  is regulated under CLIA as quilified to perfom high-complexity testing.      POC Glucose Once [[id number]]  (Normal) Collected:  03/13/18 0915    Specimen:  Blood Updated:  03/13/18 0916     Glucose 92 mg/dL     Narrative:       Meter: [id number] : [id number] Alec ARCEO    Body Fluid Culture - Body Fluid, Peritoneum [[id number]]  (Normal) Collected:  03/09/18 1410    Specimen:  Body Fluid from Peritoneum Updated:  03/13/18 0823     BF Culture No growth at 4 days     Gram Stain Result Few (2+) WBCs seen      No organisms seen    Folate [[id number]]  (Normal) Collected:  03/13/18 0422    Specimen:  Blood Updated:  03/13/18 0601     Folate 13.79 ng/mL     Basic Metabolic Panel [[id number]]  (Abnormal) Collected:  03/13/18 0422    Specimen:  Blood Updated:  03/13/18 0540     Glucose 96 mg/dL      BUN 38 (H) mg/dL      Creatinine 1.82 (H) mg/dL      Sodium 137 mmol/L      Potassium 4.0 mmol/L      Chloride 96 (L) mmol/L      CO2 30.0 (H) mmol/L      Calcium 7.8 (L) mg/dL      eGFR Non African Amer 37 (L) mL/min/1.73      BUN/Creatinine Ratio 20.9     Anion Gap 11.0 mmol/L     Narrative:       GFR Normal >60  Chronic Kidney Disease <60  Kidney Failure <15    CBC & Differential [[id number]] Collected:  03/13/18 0422    Specimen:  Blood Updated:  03/13/18 0522    Narrative:       The following orders were created for panel order CBC & Differential.  Procedure                               Abnormality         Status                     ---------                               -----------         ------                     CBC Auto Differential[[id number]]        Abnormal            Final result                 Please view results for these tests on the individual orders.    CBC Auto Differential [[id number]]  (Abnormal) Collected:  03/13/18 0422    Specimen:  Blood Updated:  03/13/18 0522     WBC 4.31 (L) 10*3/mm3      RBC 3.27 (L) 10*6/mm3      Hemoglobin 10.3 (L) g/dL      Hematocrit 32.1 (L) %      MCV 98.2 (H) fL       MCH 31.5 pg      MCHC 32.1 (L) g/dL      RDW 14.4 %      RDW-SD 51.5 fl      MPV 12.8 (H) fL      Platelets 119 (L) 10*3/mm3      Neutrophil % 71.6 %      Lymphocyte % 10.4 (L) %      Monocyte % 16.9 (H) %      Eosinophil % 0.9 %      Basophil % 0.2 %      Immature Grans % 0.0 %      Neutrophils, Absolute 3.08 10*3/mm3      Lymphocytes, Absolute 0.45 (L) 10*3/mm3      Monocytes, Absolute 0.73 10*3/mm3      Eosinophils, Absolute 0.04 10*3/mm3      Basophils, Absolute 0.01 10*3/mm3      Immature Grans, Absolute 0.00 10*3/mm3     POC Glucose Once [[id number]]  (Normal) Collected:  03/12/18 2104    Specimen:  Blood Updated:  03/12/18 2116     Glucose 129 mg/dL     Narrative:       Meter: [id number] : [id number] Ward OCAMPO    POC Glucose Once [[id number]]  (Normal) Collected:  03/12/18 1625    Specimen:  Blood Updated:  03/12/18 1627     Glucose 93 mg/dL     Narrative:       Meter: [id number] : [id number] Jaswant OCAMPO        Imaging Results (last 24 hours)     Procedure Component Value Units Date/Time    US Paracentesis [[id number]] Collected:  03/13/18 0923    Specimen:  Body Fluid Updated:  03/13/18 0927    Narrative:       US PARACENTESIS-     HISTORY: Ascites     PROCEDURE: Ultrasound guided paracentesis.     FINDINGS: Informed consent was obtained. Preliminary sonography of the  abdomen was performed. Fluid localized. The overlying skin was prepped  in the usual sterile fashion. Local anesthesia was achieved with 1%  lidocaine. A small nick was made in the skin with a scalpel. The needle  catheter device was inserted through the nick under sonographic  guidance. The needle was removed and the catheter remained and was  connected to suction. 9.35 liters of lacy fluid was withdrawn. The  patient tolerated procedure without evidence of complication and left  the procedure room in stable condition.  .    Impression:       1. Technically successful ultrasound-guided paracentesis.     This  report was finalized on 3/13/2018 9:24 AM by Dr. Wilbert Roberson MD.           EKG:  Rate: 95  afib with IVCD, frequent PVCs,   diffuse nonspecific ST and atT wave changes, difference from. 08 2016 when he was in a narrow complex, otherwise similar..      Current Facility-Administered Medications:   •  acetaminophen (TYLENOL) tablet 650 mg, 650 mg, Oral, Q4H PRN, FRANK Moreno  •  albumin human 25 % IV SOLN 25 g, 25 g, Intravenous, Once, Joseph Trinidad MD  •  allopurinol (ZYLOPRIM) tablet 100 mg, 100 mg, Oral, Daily, Gal Randall MD, 100 mg at 03/13/18 0948  •  apixaban (ELIQUIS) tablet 5 mg, 5 mg, Oral, Q12H, FRANK Moreno  •  bumetanide (BUMEX) tablet 1 mg, 1 mg, Oral, BID, Joseph Trinidad MD, 1 mg at 03/12/18 2045  •  FLUoxetine (PROzac) capsule 20 mg, 20 mg, Oral, Daily, FRANK Moreno, 20 mg at 03/13/18 0948  •  folic acid (FOLVITE) tablet 1 mg, 1 mg, Oral, Daily, Gal Randall MD, 1 mg at 03/13/18 0948  •  ipratropium-albuterol (DUO-NEB) nebulizer solution 3 mL, 3 mL, Nebulization, Q4H PRN, Gal Randall MD  •  metoprolol tartrate (LOPRESSOR) tablet 25 mg, 25 mg, Oral, Q12H, FRANK Moreno, 25 mg at 03/12/18 2045  •  multivitamin (THERAGRAN) tablet 1 tablet, 1 tablet, Oral, Daily, Gal Randall MD, 1 tablet at 03/13/18 0948  •  neomycin-bacitracin-polymyxin (NEOSPORIN) ointment 1 application, 1 application, Topical, Daily, Gal Randall MD, 1 application at 03/13/18 0954  •  pantoprazole (PROTONIX) EC tablet 40 mg, 40 mg, Oral, Daily, FRANK Moreno, 40 mg at 03/13/18 0948  •  potassium chloride (MICRO-K) CR capsule 40 mEq, 40 mEq, Oral, PRN, Epifanio May MD, 40 mEq at 03/12/18 0620  •  sodium chloride 0.9 % flush 1-10 mL, 1-10 mL, Intravenous, PRN, FRANK Moreno  •  thiamine (VITAMIN B-1) tablet 100 mg, 100 mg, Oral, Daily, Gal Randall MD, 100 mg at 03/13/18 0948     ASSESSMENT  Decompensated CHF  Anasarca  Volume overload  Cirrhosis with ascites status post  paracentesis  Hypertension  Alcohol abuse  Pancytopenia secondary to alcohol abuse  Elevated troponin with known coronary artery disease  Prostate cancer  Depression  Paroxysmal atrial fibrillation on ELIQUIS    PLAN  CPM  Diuresis per nephrology  Resume Eliquis  Continue home medication and adjust the doses  Detox with alcohol withdrawal precautions  Supportive care  Stress ulcer prophylaxis  Discharge once okay with all    VICTOR MANUEL VALDIVIA MD

## 2018-03-13 NOTE — NURSING NOTE
Attempted to get patients albumin x2 from the pharmacy they believed it was already delivered to be processed and tubed to [facility]

## 2018-03-13 NOTE — PROGRESS NOTES
[facility]  Inpatient Progress Note    Reason for Follow Up:  Cirrhosis, ascites    Subjective     Interval History:   9 L off this morning with paracentesis.  Albumin given.  Tolerating diet.  Wife asking about rehab at discharge.    Current Facility-Administered Medications:   •  acetaminophen (TYLENOL) tablet 650 mg, 650 mg, Oral, Q4H PRN, FRANK Moreno  •  albumin human 25 % IV SOLN 25 g, 25 g, Intravenous, Once, Joseph Trinidad MD  •  allopurinol (ZYLOPRIM) tablet 100 mg, 100 mg, Oral, Daily, Gal Randall MD, 100 mg at 03/13/18 0948  •  apixaban (ELIQUIS) tablet 5 mg, 5 mg, Oral, Q12H, FRANK Moreno  •  bumetanide (BUMEX) tablet 1 mg, 1 mg, Oral, BID, Joseph Trinidad MD, 1 mg at 03/12/18 2045  •  FLUoxetine (PROzac) capsule 20 mg, 20 mg, Oral, Daily, Zakiya Valverde APRN, 20 mg at 03/13/18 0948  •  folic acid (FOLVITE) tablet 1 mg, 1 mg, Oral, Daily, Gal Randall MD, 1 mg at 03/13/18 0948  •  ipratropium-albuterol (DUO-NEB) nebulizer solution 3 mL, 3 mL, Nebulization, Q4H PRN, Gal Randall MD  •  metoprolol tartrate (LOPRESSOR) tablet 25 mg, 25 mg, Oral, Q12H, FRANK Moreno, 25 mg at 03/12/18 2045  •  multivitamin (THERAGRAN) tablet 1 tablet, 1 tablet, Oral, Daily, Gal Randall MD, 1 tablet at 03/13/18 0948  •  neomycin-bacitracin-polymyxin (NEOSPORIN) ointment 1 application, 1 application, Topical, Daily, Gal Randall MD, 1 application at 03/13/18 0954  •  pantoprazole (PROTONIX) EC tablet 40 mg, 40 mg, Oral, Daily, Zakiya Valverde APRN, 40 mg at 03/13/18 0948  •  potassium chloride (MICRO-K) CR capsule 40 mEq, 40 mEq, Oral, PRN, Epifanio May MD, 40 mEq at 03/12/18 0620  •  sodium chloride 0.9 % flush 1-10 mL, 1-10 mL, Intravenous, PRN, FRANK Moreno  •  thiamine (VITAMIN B-1) tablet 100 mg, 100 mg, Oral, Daily, Galmarline Randall MD, 100 mg at 03/13/18 0948  Review of Systems:    All systems were reviewed and negative except for:  Constitution:  positive  for weakness    Objective     Vital Signs  Temp:  [97.2 °F (36.2 °C)-97.8 °F (36.6 °C)] 97.2 °F (36.2 °C)  Heart Rate:  [52-71] 52  Resp:  [15-18] 16  BP: ()/(64-81) 94/67  Body mass index is 26.78 kg/m².    Intake/Output Summary (Last 24 hours) at 03/13/18 1744  Last data filed at 03/13/18 1342   Gross per 24 hour   Intake              480 ml   Output             9950 ml   Net            -9470 ml     I/O this shift:  In: 480 [P.O.:480]  Out: 9950 [Urine:600; Other:9350]     Physical Exam:   General: patient awake, alert and cooperative, appears cachetic, chronically ill   Eyes: Normal lids and lashes, no scleral icterus   Neck: supple, normal ROM   Skin: warm and dry, not jaundiced   Cardiovascular: regular rhythm and rate, no murmurs auscultated   Pulm: clear to auscultation bilaterally, regular and unlabored on room air   Abdomen:soft, notender, nondistended; normal bowel sounds   Rectal: deferred   Extremities: no rash, 1+ edema BLE with stasis   Psychiatric: Normal mood and behavior; memory intact     Results Review:     I reviewed the patient's new clinical results.      Results from last 7 days  Lab Units 03/13/18 0422 03/12/18 0448 03/11/18  0458   WBC 10*3/mm3 4.31* 3.96* 3.95*   HEMOGLOBIN g/dL 10.3* 10.1* 9.9*   HEMATOCRIT % 32.1* 31.2* 30.8*   PLATELETS 10*3/mm3 119* 103* 100*       Results from last 7 days  Lab Units 03/13/18  0422 03/12/18 0448 03/11/18  0458  03/09/18  0412  03/08/18  0601   SODIUM mmol/L 137 138 141  < > 139  --  141   POTASSIUM mmol/L 4.0 3.6 3.5  < > 3.7  < > 4.2   CHLORIDE mmol/L 96* 98 97*  < > 92*  --  96*   CO2 mmol/L 30.0* 28.3 29.6*  < > 29.0  --  32.5*   BUN mg/dL 38* 38* 21  < > 32*  --  27*   CREATININE mg/dL 1.82* 2.08* 2.48*  < > 1.78*  --  1.38*   CALCIUM mg/dL 7.8* 8.2* 6.9*  < > 8.2*  --  8.2*   BILIRUBIN mg/dL  --  1.1  --   --  1.3*  --  1.4*   ALK PHOS U/L  --  71  --   --  99  --  91   ALT (SGPT) U/L  --  15  --   --  14  --  8   AST (SGOT) U/L  --  24   --   --  31  --  20   GLUCOSE mg/dL 96 98 120*  < > 100*  --  93   < > = values in this interval not displayed.    Results from last 7 days  Lab Units 03/12/18  0448 03/09/18  0703 03/07/18  1639   INR  1.64* 1.86* 1.31*     No results found for: LIPASE    Radiology:  US Paracentesis   Final Result   1. Technically successful ultrasound-guided paracentesis.       This report was finalized on 3/13/2018 9:24 AM by Dr. Wilbert Roberson MD.          US Paracentesis   Final Result      XR Chest 2 View   Final Result   Borderline cardiomegaly and small left pleural effusion.       This report was finalized on 3/8/2018 7:56 AM by Dr. Zachery Ngo MD.          CT Abdomen Pelvis Without Contrast   Final Result   1. Small left pleural effusion.   2. Large amount of ascites.   3. Liver appears somewhat small. Please correlate for cirrhosis.       Radiation dose reduction techniques were utilized, including automated   exposure control and exposure modulation based on body size.       This report was finalized on 3/8/2018 7:56 AM by Dr. Zachery Ngo MD.              Assessment/Plan     Patient Active Problem List   Diagnosis   • CHF exacerbation   • Right heart failure   • Ascites of liver   • Hypertension   • Atrial fibrillation   • Alcoholism   • History of coronary angioplasty   • DM2 (diabetes mellitus, type 2)   • Pancytopenia   • Chronic liver disease   • Elevated troponin   • CKD (chronic kidney disease)   • Hypomagnesemia   • Hypokalemia       Impression  1. Cirrhosis: most likely alcoholic complicated by cardiac cirrhosis  2. Ascites: repeat paracentesis this morning more consistent with portal htn etiology   3. Alcohol abuse: ongoing prior to admission  4. CHF: cardiology following  5. CKD: stage III, now on bumex  6. A fib: on eliquis    Plan  -diuretics as per renal  -start midodrine  -continue low sodium diet, 6 small meals daily  -close follow up in our office-- will send message to our office  -will  need EGD for variceal screening in the future  -q 6 month US  -ETOH cessation    I discussed the patients findings and my recommendations with patient, family and nursing staff.    Marlen Benavides MD

## 2018-03-13 NOTE — PROGRESS NOTES
"[facility]  Cardiology Progress Note    Patient Identification:  Name: Anibal Freedman  Age: 68 y.o.  Sex: male  :  1950  MRN: [id number]                 Follow Up / Chief Complaint: Shortness of breath, swelling, history of CAD, permanent A. fib, systolic heart failure    Interval History:  68-year-old alcoholic with permanent A. fib, CAD and biventricular heart failure admitted with acute on chronic heart failure, ascites/anasara/ cor pulmonale, electrolyte imbalance.  S/P paracentesis 3/9/18 and 3-13-18.       Subjective:  \"Feel pretty good\"  Following paracentesis. Denies chest pain or dizziness. Breathing \"OK' - denies cough.     Objective:  Afib,cvr with occasional PVC  -116 / 70-80's     Renal function continues to improve  BUN 38, cr 1.82  Paracentesis=> 9350ml fluid  H/H and platelets stable - no further workup planned per hematology    Past Medical History:  Past Medical History:   Diagnosis Date   • Alcoholism    • Arthritis    • Atrial fibrillation     pt reported   • Cellulitis    • CHF (congestive heart failure)    • Colon polyps 2006    Colonoscopy w/ snare cautery, polypectomy & biopsy, PATH:  Sigmoid Colon Biopsy: focal vascular congestion & evidence of recent hemorrhage, non-specific.  Recto-Sigmoid Colon Biopsy: Fragments of tubular adenoam w/ mild dysplasia. Dr. Mildred You   • DM2 (diabetes mellitus, type 2) 3/7/2018   • History of coronary angioplasty    • History of MRSA infection     pt reported   • History of staph infection 2005   • Hypertension    • Infectious viral hepatitis     pt reported   • Prostate cancer 2010    S/P Radical Prostectomy, Adenocarcinoma, Primary Yissel Grade 3, Secondar Solen Grade 3. Combined yissel score 6. Tumor involves rt & lt lobes, negative capsular margin, no invasion of seminal vesicles, no identified perineural invastion   • Withdrawal symptoms, alcohol      Past Surgical History:  Past Surgical " History:   Procedure Laterality Date   • APPENDECTOMY     • CARDIAC ABLATION Right 01/18/2013    Atrial Flutter Ablation, Dr. Otis Srinivasan   • CARDIAC CATHETERIZATION N/A 8/2/2016    Procedure: Left Heart Cath   ?right cath too;  Surgeon: Epifanio May MD;  Location:  [facility]AS[facility];  Service:    • CARDIAC CATHETERIZATION N/A 8/2/2016    Procedure: Coronary angiography;  Surgeon: Epifanio May MD;  Location:  [facility];  Service:    • CARDIAC CATHETERIZATION N/A 8/2/2016    Procedure: Left ventriculography;  Surgeon: Epifanio May MD;  Location:  [facility];  Service:    • CARDIAC CATHETERIZATION N/A 8/2/2016    Procedure: Right Heart Cath;  Surgeon: Epifanio May MD;  [facility];  Service:    • COLONOSCOPY N/A 3/15/2017    Procedure: COLONOSCOPY TO CECUM WITH COLD BIOPSY POLYECTOMY;  Surgeon: Anibal Bower MD;  Location: [facility] ENDOSCOPY;  Service:    • COLONOSCOPY W/ BIOPSIES AND POLYPECTOMY N/A 08/21/2006    Colonoscopy w/ snare cautery, polypectomy & biopsy, PATH:  Sigmoid Colon Biopsy: focal vascular congestion & evidence of recent hemorrhage, non-specific.  Recto-Sigmoid Colon Biopsy: Fragments of tubular adenoam w/ mild dysplasia. Dr. Mildred You   • CORONARY ANGIOPLASTY WITH STENT PLACEMENT      x 2    • CYSTOSCOPY N/A 04/28/2010    Cystoscopy w/ transurethral incision of the bladder neck, Dr. ANDREW Cordova   • CYSTOTOMY N/A 09/23/2011    Laparoscopic closure of incidental cystotomy, Laparoscopic incision of pelvic lymphocele, Dr. Jaime Royal   • JOINT REPLACEMENT     • LEG DEBRIDEMENT Left 08/19/2005    Left Thigh, Debridement skin & subcutaneous tissue muscle w/ closure via advancement flaps, Dr. Mildred You   • LEG DEBRIDEMENT Left 08/08/2005    Debridement of left anterior thigh, Dr. Mildred You   • PROSTATECTOMY N/A 04/28/2010    Adenocarcinoma, Primary Sowmya Grade 3,  Secondar Yissel Grade 3. Combined yissel score 6. Tumor involves rt & lt lobes, negative capsular margin, no invasion of seminal vesicles, no identified perineural invastion, Dr. Jaime Royal   • SKIN BIOPSY     • TONSILLECTOMY     • TOTAL KNEE ARTHROPLASTY Left 03/25/2008    Left total knee arthroplasty w/ Juan pinless computer navigation, Dr. Ashok Crocker   • WOUND DEBRIDEMENT Left 08/02/2005    Left Buttocks & Left thigh wounds, Debridement of left buttocks & left thigh wounds, Dr. Mildred You        Social History:   Social History   Substance Use Topics   • Smoking status: Former Smoker     Packs/day: 1.50     Quit date: 3/15/2007   • Smokeless tobacco: Not on file   • Alcohol use 3.6 oz/week     6 Cans of beer per week      Comment: 1 pint a day OR MORE OF VODKA      Family History:  Family History   Problem Relation Age of Onset   • Heart disease Mother    • Alcohol abuse Father    • Liver cancer Father    • Cancer Sister    • Hypertension Brother    • Alcohol abuse Brother    • Skin cancer Brother           Allergies:  No Known Allergies     Scheduled Meds:    albumin human 75 g Once   allopurinol 100 mg Daily   bumetanide 1 mg BID   FLUoxetine 20 mg Daily   folic acid 1 mg Daily   metoprolol tartrate 25 mg Q12H   multivitamin 1 tablet Daily   neomycin-bacitracin-polymyxin 1 application Daily   pantoprazole 40 mg Daily   thiamine 100 mg Daily           INTAKE AND OUTPUT:    Intake/Output Summary (Last 24 hours) at 03/13/18 1346  Last data filed at 03/13/18 1342   Gross per 24 hour   Intake              480 ml   Output            59352 ml   Net            -9520 ml       Review of Systems:   GI:  No nausea or vomiting  Cardiac:  No chest pain or dizizness  Pulmonary: No cough    Constitutional:  Temp:  [97.4 °F (36.3 °C)-97.8 °F (36.6 °C)] 97.4 °F (36.3 °C)  Heart Rate:  [55-71] 60  Resp:  [15-20] 16  BP: ()/(42-83) 100/71    Physical Exam:  General:  Awake, alert resting quietly.  Appears  chronically ill but, in no acute distress  Eyes: PERTL,  HEENT:  1+ JVD with head of bed up 45°. Oral mucosa moist, no cyanosis  Respiratory: Respirations regular unlabored at rest on room air. BBS with improved -but diminished-air entry in bases..  No wheezes auscultated  Cardiovascular: S1S2 irregular rate and rhythm. No murmur or rub. Trace pretibial edema  Gastrointestinal: Abdomen soft with fluid wave-but markedly diminished ascites after paracentesis Non tender. Bowel sounds present.  Musculoskeletal: PEDRO x4. No abnormal movements  Extremities:  fingers and toes bita  Skin: Skin warm and dry to touch on torso  LE cool to touch below.    Neuro: AAO x3 CN II-XII grossly intact   Psych: Mood and affect normal, pleasant and cooperative       Cardiographics  Telemetry: afib cvr  60's with occasional PVC         Echocardiogram:   · Left ventricular wall thickness is consistent with mild concentric hypertrophy.  · The following left ventricular wall segments are hypokinetic: mid anterior, apical septal, basal inferoseptal, mid inferoseptal, apex hypokinetic, mid anteroseptal, basal anterior and basal inferoseptal.  · Right ventricular cavity is moderately dilated.  · Left atrial cavity size is moderately dilated.  · Mild pulmonic valve regurgitation is present.  · Mild to moderate tricuspid valve regurgitation is present.  · Mild mitral valve regurgitation is present  · Mild aortic valve regurgitation is present.  · Left Ventricle: Calculated EF = 22.5%        R&L heart cath 8-2016:  HEMODYNAMIC / ANGIOGRAPHIC DATA:    .   1. Pulmonary artery systolic pressure was 55/25.  2. Right atrial pressure was 10..  3. Left ventricular end diastolic pressure was 15 mmHg.  4. The left main is normal.  5. The LAD is proximal stent had 40-50% stenosis  6. Nondominant circumflex free of any atherosclerotic narrowing.  7. The right coronary artery is dominant with a diffuse proximal 50% mid 50-60% stenosis.  8. Mild pulmonary  hypertension      Lab Review     Results from last 7 days  Lab Units 03/13/18  0422   SODIUM mmol/L 137   POTASSIUM mmol/L 4.0   BUN mg/dL 38*   CREATININE mg/dL 1.82*   CALCIUM mg/dL 7.8*       Results from last 7 days  Lab Units 03/13/18  0422 03/12/18  0448 03/11/18  0458   WBC 10*3/mm3 4.31* 3.96* 3.95*   HEMOGLOBIN g/dL 10.3* 10.1* 9.9*   HEMATOCRIT % 32.1* 31.2* 30.8*   PLATELETS 10*3/mm3 119* 103* 100*         Assessment:  - a/c biventricular systolic heart failure  - cor pulmonale / anasarca / ascites   - CKD III - nephrology following   - Anemia  - alcoholism  - Thrombocytopenia -> Hematology  - CAD - h/o BMS LAD, kissing balloon to adjacent diagonal,  BMS-> mid-distal RCA 1-2013  - (Permanent) atrial fibrillation -> Eliquis    - s/p recurrent hypomagnesia  - s/p hypokalemia  - h/o atrial flutter ablation 2013      Plan:  - a/c biventricular heart failure -  Symptomatic improvement after aggressive diuresis, medication adjustment and paracentesis.  On beta blocker, diuretic as per nephrology. No ACE-I, ARB, spironolactone, due to CKD. Echo - EF 22% Mild concentric LVH , mild to mod tr, mild MR, mild AI.    - cor pulmonale / anasarca / ascites/ cirrhosis -   improved lower extremity edema with diuretics. Diuretic management as per Nephrology. S/p Paracentesis 3/9 and 3-13.. Diuretic management as per renal    - CAD - No chest pain.  MI ruled out.  Stress test (-) for ischemia.  H/o LAD& RCA PCI & diagonal PTCA 2013. Non obstructive CAD per cath 2016.  On beta blocker (No ACE-I, ARB, diuretic due to CKD, BP too low for hydralazine).     - (Permanent) atrial fibrillation -> on Eliquis. Rate controlled. H/H and platelets stable.  H/o atrial flutter ablation 2013      Resume Eliquis.  Anticipate discharge soon.  Patient/spouse interested in nursing rehabilitation as opposed to EtOH rehabilitation at discharge-care manager to investigate.      Labs/tests ordered: Medication adjustment, magnesium, BNP and  "BMP        I reviewed the patient's new clinical results and treatment plan  I personally viewed and interpreted the patient's EKG/Telemetry data    )3/13/2018  Epifanio May MD      EMR Dragon/Transcription:   \"Dictated utilizing Dragon dictation\".     "

## 2018-03-13 NOTE — PLAN OF CARE
Problem: Patient Care Overview  Goal: Plan of Care Review   03/13/18 1517   Coping/Psychosocial   Plan of Care Reviewed With patient   Plan of Care Review   Progress improving   OTHER   Outcome Summary patient s/p paracentesis. denies any pain complaints however complains of being dizzy with ambulation at times. patient remains in afib. will continue to monitor.

## 2018-03-14 VITALS
SYSTOLIC BLOOD PRESSURE: 103 MMHG | RESPIRATION RATE: 16 BRPM | HEART RATE: 47 BPM | DIASTOLIC BLOOD PRESSURE: 72 MMHG | HEIGHT: 71 IN | OXYGEN SATURATION: 99 % | WEIGHT: 192 LBS | BODY MASS INDEX: 26.88 KG/M2 | TEMPERATURE: 97.7 F

## 2018-03-14 LAB
ANION GAP SERPL CALCULATED.3IONS-SCNC: 10.1 MMOL/L
BACTERIA FLD CULT: NORMAL
BUN BLD-MCNC: 39 MG/DL (ref 8–23)
BUN/CREAT SERPL: 21.4 (ref 7–25)
CALCIUM SPEC-SCNC: 7.8 MG/DL (ref 8.6–10.5)
CHLORIDE SERPL-SCNC: 98 MMOL/L (ref 98–107)
CO2 SERPL-SCNC: 30.9 MMOL/L (ref 22–29)
CREAT BLD-MCNC: 1.82 MG/DL (ref 0.76–1.27)
GFR SERPL CREATININE-BSD FRML MDRD: 37 ML/MIN/1.73
GLUCOSE BLD-MCNC: 101 MG/DL (ref 65–99)
GLUCOSE BLDC GLUCOMTR-MCNC: 64 MG/DL (ref 70–130)
GLUCOSE BLDC GLUCOMTR-MCNC: 75 MG/DL (ref 70–130)
GRAM STN SPEC: NORMAL
GRAM STN SPEC: NORMAL
MAGNESIUM SERPL-MCNC: 2.1 MG/DL (ref 1.6–2.4)
NT-PROBNP SERPL-MCNC: ABNORMAL PG/ML (ref 5–900)
POTASSIUM BLD-SCNC: 4 MMOL/L (ref 3.5–5.2)
SODIUM BLD-SCNC: 139 MMOL/L (ref 136–145)

## 2018-03-14 PROCEDURE — 99238 HOSP IP/OBS DSCHRG MGMT 30/<: CPT | Performed by: INTERNAL MEDICINE

## 2018-03-14 PROCEDURE — 97110 THERAPEUTIC EXERCISES: CPT

## 2018-03-14 PROCEDURE — 82962 GLUCOSE BLOOD TEST: CPT

## 2018-03-14 PROCEDURE — 83735 ASSAY OF MAGNESIUM: CPT | Performed by: NURSE PRACTITIONER

## 2018-03-14 PROCEDURE — 99231 SBSQ HOSP IP/OBS SF/LOW 25: CPT | Performed by: INTERNAL MEDICINE

## 2018-03-14 PROCEDURE — 80048 BASIC METABOLIC PNL TOTAL CA: CPT | Performed by: NURSE PRACTITIONER

## 2018-03-14 PROCEDURE — 83880 ASSAY OF NATRIURETIC PEPTIDE: CPT | Performed by: NURSE PRACTITIONER

## 2018-03-14 RX ORDER — DIPHENOXYLATE HYDROCHLORIDE AND ATROPINE SULFATE 2.5; .025 MG/1; MG/1
1 TABLET ORAL DAILY
Qty: 30 TABLET
Start: 2018-03-15 | End: 2018-04-04

## 2018-03-14 RX ORDER — IBUPROFEN 200 MG
1 TABLET ORAL DAILY
Qty: 1 TUBE | Refills: 0 | Status: SHIPPED | OUTPATIENT
Start: 2018-03-15 | End: 2018-05-25

## 2018-03-14 RX ORDER — BUMETANIDE 1 MG/1
1 TABLET ORAL 2 TIMES DAILY
Qty: 60 TABLET | Refills: 3 | Status: SHIPPED | OUTPATIENT
Start: 2018-03-14

## 2018-03-14 RX ADMIN — FLUOXETINE HYDROCHLORIDE 20 MG: 20 CAPSULE ORAL at 08:49

## 2018-03-14 RX ADMIN — MIDODRINE HYDROCHLORIDE 5 MG: 5 TABLET ORAL at 12:22

## 2018-03-14 RX ADMIN — MIDODRINE HYDROCHLORIDE 5 MG: 5 TABLET ORAL at 07:35

## 2018-03-14 RX ADMIN — ALLOPURINOL 100 MG: 100 TABLET ORAL at 08:49

## 2018-03-14 RX ADMIN — PANTOPRAZOLE SODIUM 40 MG: 40 TABLET, DELAYED RELEASE ORAL at 10:12

## 2018-03-14 RX ADMIN — BUMETANIDE 1 MG: 1 TABLET ORAL at 08:49

## 2018-03-14 RX ADMIN — FOLIC ACID 1 MG: 1 TABLET ORAL at 08:49

## 2018-03-14 RX ADMIN — APIXABAN 5 MG: 5 TABLET, FILM COATED ORAL at 08:49

## 2018-03-14 RX ADMIN — Medication 1 TABLET: at 08:49

## 2018-03-14 RX ADMIN — Medication 100 MG: at 08:49

## 2018-03-14 RX ADMIN — METOPROLOL TARTRATE 25 MG: 25 TABLET ORAL at 08:50

## 2018-03-14 NOTE — PROGRESS NOTES
"   LOS: 7 days   Patient Care Team:  Moustapha Long MD as PCP - General  Moustapha Long MD as PCP - Family Medicine    Chief Complaint/ Reason for encounter: Acute renal failure/chronic kidney disease  Chief Complaint   Patient presents with   • Edema         Subjective     History of Present Illness    Subjective:  Symptoms:  Stable.  No shortness of breath or chest pain.  (No new complaints  Repeat paracentesis yesterday went well, albumin was ordered afterwards  He feels well, discharged to rehabilitation pending   Edema improved).    Diet:  Adequate intake.  No nausea.    Activity level: Returning to normal.    Pain:  He reports no pain.          History taken from: Patient and chart    Objective     Vital Signs  Temp:  [97.2 °F (36.2 °C)-98.8 °F (37.1 °C)] 98.8 °F (37.1 °C)  Heart Rate:  [49-62] 62  Resp:  [16-17] 17  BP: ()/(67-75) 117/68       Wt Readings from Last 1 Encounters:   03/13/18 0432 87.1 kg (192 lb)   03/12/18 0500 86 kg (189 lb 8 oz)   03/11/18 0455 84.5 kg (186 lb 4 oz)   03/09/18 0348 99.8 kg (220 lb)   03/08/18 0446 99.7 kg (219 lb 12.8 oz)   03/07/18 1249 99.8 kg (220 lb)       Objective:  General Appearance:  Comfortable, well-appearing, in no acute distress and not in pain.    Vital signs: (most recent): Blood pressure 117/68, pulse 62, temperature 98.8 °F (37.1 °C), temperature source Oral, resp. rate 17, height 180.3 cm (71\"), weight 87.1 kg (192 lb), SpO2 99 %.  Vital signs are normal.  No fever.    Output: Producing urine.    HEENT: Normal HEENT exam.    Lungs:  Normal effort and normal respiratory rate.  Breath sounds clear to auscultation.  He is not in respiratory distress.  No wheezes.    Heart: Normal rate.  Regular rhythm.  S1 normal.  No murmur.   Abdomen: Abdomen is soft and non-distended.  There are signs of ascites. (Less distended).  Bowel sounds are normal.   There is no epigastric area or suprapubic area tenderness.  There is no rebound tenderness.   There is no " mass.   Extremities: Normal range of motion.  There is dependent edema.  There is no deformity.  (Decreased lower extremity edema)  Pulses: Distal pulses are intact.    Neurological: Patient is alert and oriented to person, place and time.    Skin:  Warm and dry.  No rash or cyanosis.             Results Review:    Past Medical History: reviewed and updated  Past Medical History:   Diagnosis Date   • Alcoholism    • Arthritis    • Atrial fibrillation     pt reported   • Cellulitis    • CHF (congestive heart failure)    • Colon polyps 08/21/2006    Colonoscopy w/ snare cautery, polypectomy & biopsy, PATH:  Sigmoid Colon Biopsy: focal vascular congestion & evidence of recent hemorrhage, non-specific.  Recto-Sigmoid Colon Biopsy: Fragments of tubular adenoam w/ mild dysplasia. Dr. Mildred You   • DM2 (diabetes mellitus, type 2) 3/7/2018   • History of coronary angioplasty    • History of MRSA infection 2005    pt reported   • History of staph infection 08/02/2005   • Hypertension    • Infectious viral hepatitis     pt reported   • Prostate cancer 04/28/2010    S/P Radical Prostectomy, Adenocarcinoma, Primary Hurley Grade 3, Secondar Hurley Grade 3. Combined yissel score 6. Tumor involves rt & lt lobes, negative capsular margin, no invasion of seminal vesicles, no identified perineural invastion   • Withdrawal symptoms, alcohol          Allergies:  No Known Allergies    Intake/Output:     Intake/Output Summary (Last 24 hours) at 03/14/18 1235  Last data filed at 03/14/18 1151   Gross per 24 hour   Intake              698 ml   Output              200 ml   Net              498 ml         DATA:  Radiology and Labs:  The following labs independently reviewed by me.  Interval notes, chart personally reviewed by me.   Old records independently reviewed showing stage III chronic kidney disease, baseline creatinine around 1.3        Labs:   Recent Results (from the past 24 hour(s))   POC Glucose Once    Collection Time:  03/13/18  5:23 PM   Result Value Ref Range    Glucose 109 70 - 130 mg/dL   Magnesium    Collection Time: 03/14/18  4:47 AM   Result Value Ref Range    Magnesium 2.1 1.6 - 2.4 mg/dL   BNP    Collection Time: 03/14/18  4:47 AM   Result Value Ref Range    proBNP 11,097.0 (H) 5.0 - 900.0 pg/mL   Basic Metabolic Panel    Collection Time: 03/14/18  4:47 AM   Result Value Ref Range    Glucose 101 (H) 65 - 99 mg/dL    BUN 39 (H) 8 - 23 mg/dL    Creatinine 1.82 (H) 0.76 - 1.27 mg/dL    Sodium 139 136 - 145 mmol/L    Potassium 4.0 3.5 - 5.2 mmol/L    Chloride 98 98 - 107 mmol/L    CO2 30.9 (H) 22.0 - 29.0 mmol/L    Calcium 7.8 (L) 8.6 - 10.5 mg/dL    eGFR Non African Amer 37 (L) >60 mL/min/1.73    BUN/Creatinine Ratio 21.4 7.0 - 25.0    Anion Gap 10.1 mmol/L   POC Glucose Once    Collection Time: 03/14/18  7:24 AM   Result Value Ref Range    Glucose 75 70 - 130 mg/dL   POC Glucose Once    Collection Time: 03/14/18 11:41 AM   Result Value Ref Range    Glucose 64 (L) 70 - 130 mg/dL       Radiology:  Imaging Results (last 24 hours)     ** No results found for the last 24 hours. **             Medications have been reviewed:  Current Facility-Administered Medications   Medication Dose Route Frequency Provider Last Rate Last Dose   • acetaminophen (TYLENOL) tablet 650 mg  650 mg Oral Q4H PRN FRANK Moreno       • albumin human 25 % IV SOLN 25 g  25 g Intravenous Once Joseph Trinidad MD       • allopurinol (ZYLOPRIM) tablet 100 mg  100 mg Oral Daily Gal Randall MD   100 mg at 03/14/18 0849   • apixaban (ELIQUIS) tablet 5 mg  5 mg Oral Q12H FRANK Moreno   5 mg at 03/14/18 0849   • bumetanide (BUMEX) tablet 1 mg  1 mg Oral BID Joseph Trinidad MD   1 mg at 03/14/18 0849   • FLUoxetine (PROzac) capsule 20 mg  20 mg Oral Daily FRANK Moreno   20 mg at 03/14/18 0849   • folic acid (FOLVITE) tablet 1 mg  1 mg Oral Daily Gal Randall MD   1 mg at 03/14/18 0849   • ipratropium-albuterol (DUO-NEB) nebulizer  solution 3 mL  3 mL Nebulization Q4H PRN Gal Randall MD       • metoprolol tartrate (LOPRESSOR) tablet 25 mg  25 mg Oral Q12H FRANK Moreno   25 mg at 03/14/18 0850   • midodrine (PROAMATINE) tablet 5 mg  5 mg Oral TID AC Marlen Benavides MD   5 mg at 03/14/18 1222   • multivitamin (THERAGRAN) tablet 1 tablet  1 tablet Oral Daily Gal Randall MD   1 tablet at 03/14/18 0849   • neomycin-bacitracin-polymyxin (NEOSPORIN) ointment 1 application  1 application Topical Daily Gal Randall MD   1 application at 03/13/18 0954   • pantoprazole (PROTONIX) EC tablet 40 mg  40 mg Oral Daily FRANK Moreno   40 mg at 03/14/18 1012   • potassium chloride (MICRO-K) CR capsule 40 mEq  40 mEq Oral PRN Epifanio May MD   40 mEq at 03/12/18 0620   • sodium chloride 0.9 % flush 1-10 mL  1-10 mL Intravenous PRN Zakiya Valverde APRNICOLAS       • thiamine (VITAMIN B-1) tablet 100 mg  100 mg Oral Daily Gal Randall MD   100 mg at 03/14/18 0849       Assessment/Plan     Principal Problem:    CHF exacerbation  Active Problems:    Right heart failure    Ascites of liver    Hypertension    Atrial fibrillation    Alcoholism    History of coronary angioplasty    DM2 (diabetes mellitus, type 2)    Pancytopenia    Chronic liver disease    Elevated troponin    CKD (chronic kidney disease)    Hypomagnesemia    Hypokalemia      Assessment:  (Assessment:   acute renal failure, likely from diuresis  slightly improved, stable today  Stage III chronic kidney disease, previous baseline creatinine 1.3 but this was prior to diuresis  Acute exacerbation of diastolic congestive heart failure, improved with diuresis  Volume overload, improved with diuresis  Ascites from cirrhosis, recurrent, status post paracentesis  Chronic hypertension  Alcoholism, discussed cessation of alcohol  Borderline elevated troponin, possibly due to chronic kidney disease  Hypokalemia on admission, improved  Metabolic alkalosis  Hypomagnesemia, worse, replace  New  hypokalemia, plantar placement        Plan:   His renal function is stable today, possible new baseline after diuresis  Continue current oral diuretics  Recommend albumin immediately after any future paracentesis   Physical therapy following, patient interested in rehabilitation  Discussed alcohol avoidance, possible rehabilitation after discharge  okay for discharge any time from a renal standpoint  Follow-up with me in the office in 2-4 weeks with repeat labs).             Continue to monitor renal function, electroytes and volume closely   Please call me with any questions or concerns      Joseph Trinidad MD   [facility]   [phone]    03/14/18  12:35 PM      Dictation performed using Dragon dictation software

## 2018-03-14 NOTE — PLAN OF CARE
Problem: Patient Care Overview  Goal: Plan of Care Review  Outcome: Ongoing (interventions implemented as appropriate)   03/14/18 1040   Coping/Psychosocial   Plan of Care Reviewed With patient   OTHER   Outcome Summary Pt increased ambulation distance, but did require 2 standing rest breaks. Tolerated exercises well. Encouraged increased mobility w/ staff throughout day. Discussed w/ SAMARA Pat.

## 2018-03-14 NOTE — PROGRESS NOTES
Continued Stay Note  [facility]     Patient Name: Anibal Freedman  MRN: [id number]  Today's Date: 3/14/2018    Admit Date: 3/7/2018          Discharge Plan     Row Name 03/14/18 6660       Plan    Plan Comments [facility]  ([facility] here to place [facility].   Pt was called back for [facility] to be applied.  [facility] ([facility]) applied Life [facility].  Rosalia  ( [phone]) called to confirm that [facility] accept pt[facility] with [facility].  Plan [facility] for [facility] care with [facility] applied.  SAMARA Daniel    Final Note Plan [facility] for skilled care.  SAMARA Daniel    Row Name 03/14/18 1613       Plan    Final Discharge Disposition Code 03 - skilled nursing facility (SNF)    Final Note Plan [facility] for [facility] care.  SAMARA Daniel    Row Name 03/14/18 2876       Plan    Plan [facility], bed available today    Patient/Family in Agreement with Plan yes    Plan Comments Pt discharged. [facility] with a bed available today. CCP met with pt and wife who confirm plan for pt to d/c to [facility] for [facility] with pt's wife to transport. IMM letter given. Packet in CCP office. Lesley Pereira LCSW              Discharge Codes    No documentation.       Expected Discharge Date and Time     Expected Discharge Date Expected Discharge Time    Mar 14, 2018             Lizzie Barnes RN

## 2018-03-14 NOTE — THERAPY TREATMENT NOTE
Acute Care - Physical Therapy Treatment Note  [facility]     Patient Name: Anbial Freedman  : 1950  MRN: [id number]  Today's Date: 3/14/2018     Date of Referral to PT: 18       Admit Date: 3/7/2018    Visit Dx:    ICD-10-CM ICD-9-CM   1. New onset ascites of liver R18.8 789.59   2. Acute on chronic congestive heart failure, unspecified congestive heart failure type I50.9 428.0   3. Elevated troponin R74.8 790.6   4. Coagulopathy on Eliquis D68.9 286.9   5. Atrial fibrillation, unspecified type I48.91 427.31   6. Gait abnormality R26.9 781.2     Patient Active Problem List   Diagnosis   • CHF exacerbation   • Right heart failure   • Ascites of liver   • Hypertension   • Atrial fibrillation   • Alcoholism   • History of coronary angioplasty   • DM2 (diabetes mellitus, type 2)   • Pancytopenia   • Chronic liver disease   • Elevated troponin   • CKD (chronic kidney disease)   • Hypomagnesemia   • Hypokalemia       Therapy Treatment    Therapy Treatment / Health Promotion    Treatment Time/Intention  Discipline: physical therapy assistant  Document Type: therapy note (daily note)  Subjective Information: no complaints  Patient Effort: good  Plan of Care Review  Plan of Care Reviewed With: patient    Vitals/Pain/Safety  Pain Scale: Numbers Pre/Post-Treatment  Pain Scale: Numbers, Pretreatment: 0/10 - no pain  Safety Issues, Functional Mobility  Impairments Affecting Function (Mobility): endurance/activity tolerance, strength  Positioning and Restraints  Pre-Treatment Position: other (comment) (sitting at EOB)  Post Treatment Position: bed  In Bed: sitting EOB, call light within reach, encouraged to call for assist, with family/caregiver, notified nsg    Mobility,ADL,Motor, Modality  Bed Mobility Assessment/Treatment  Supine-Sit [place] (Bed Mobility): not tested  Comment (Bed Mobility): Pt up at EOB and returned to EOB  Transfer Assessment/Treatment  Transfer Assessment/Treatment: sit-stand transfer,  stand-sit transfer  Comment (Transfers): Cueing required for hand placement  Sit-Stand Transfer  Sit-Stand [place] (Transfers): stand by assist, verbal cues  Assistive Device (Sit-Stand Transfers): walker, front-wheeled  Stand-Sit Transfer  Stand-Sit [place] (Transfers): stand by assist  Assistive Device (Stand-Sit Transfers): walker, front-wheeled  Gait/Stairs Assessment/Training  Industry Level (Gait): stand by assist, contact guard, verbal cues  Assistive Device (Gait): walker, front-wheeled  Distance in Feet (Gait): 100 (x2 w/ 2 standing rest breaks)  Pattern (Gait): swing-through  Deviations/Abnormal Patterns (Gait): chanelle decreased, gait speed decreased  Comment (Gait/Stairs): 2 standing rest breaks due to fatigue; Dizziness towards end of ambulaiton     Therapeutic Exercise  Therapeutic Exercise: seated, lower extremities  Lower Extremity Seated Therapeutic Exercise  Performed, Seated Lower Extremity (Therapeutic Exercise): hip flexion/extension, ankle dorsiflexion/plantarflexion, LAQ (long arc quad), knee extension  Exercise Type, Seated Lower Extremity (Therapeutic Exercise): AROM (active range of motion)  Sets/Reps Detail, Seated Lower Extremity (Therapeutic Exercise): 15 reps           ROM/MMT             Sensory, Edema, Orthotics          Cognition, Communication, Swallow  Cognitive Assessment/Intervention- PT/OT  Orientation Status (Cognition): oriented x 4  Follows Commands (Cognition): WNL  Personal Safety Interventions: fall prevention program maintained, gait belt, nonskid shoes/slippers when out of bed    Outcome Summary             Physical Therapy Education     Title: PT OT SLP Therapies (Done)     Topic: Physical Therapy (Done)     Point: Mobility training (Done)    Learning Progress Summary     Learner Status Readiness Method Response Comment Documented by    Patient Done Acceptance E,TB,D YAEL,NR  RW 03/14/18 1042     Done Acceptance E YAEL Need for AD  03/12/18 1229     Done  Acceptance E VU,NR  JK 03/09/18 1220    Significant Other Done Acceptance E,TB,D VU,NR   03/14/18 1042          Point: Home exercise program (Done)    Learning Progress Summary     Learner Status Readiness Method Response Comment Documented by    Patient Done Acceptance E,TB,D VU,NR   03/14/18 1042    Significant Other Done Acceptance E,TB,D VU,NR  RW 03/14/18 1042          Point: Body mechanics (Done)    Learning Progress Summary     Learner Status Readiness Method Response Comment Documented by    Patient Done Acceptance E,TB,D VU,NR   03/14/18 1042    Significant Other Done Acceptance E,TB,D VU,NR   03/14/18 1042          Point: Precautions (Done)    Learning Progress Summary     Learner Status Readiness Method Response Comment Documented by    Patient Done Acceptance E,TB,D VU,NR   03/14/18 1042    Significant Other Done Acceptance E,TB,D VU,NR   03/14/18 1042                      User Key     Initials Effective Dates Name Provider Type Discipline     12/01/15 -  China Gomez, PT Physical Therapist PT     12/01/15 -  Anabel Renae, PT Physical Therapist PT     03/07/18 -  Randi De Paz, PTA Physical Therapy Assistant PT                    PT Recommendation and Plan  Anticipated Discharge Disposition (PT): home with home health care, inpatient rehab facility  Plan of Care Reviewed With: patient  Outcome Summary: Pt increased ambulation distance, but did require 2 standing rest breaks. Tolerated exercises well. Encouraged increased mobility w/ staff throughout day. Discussed w/ SAMARA Pat.          Outcome Measures     Row Name 03/14/18 1030 03/12/18 1200 03/12/18 1057       How much help from another person do you currently need...    Turning from your back to your side while in flat bed without using bedrails? 4  -RW 4  -KP  --    Moving from lying on back to sitting on the side of a flat bed without bedrails? 4  -RW 4  -KP  --    Moving to and from a bed to a chair (including a  wheelchair)? 3  -RW 3  -KP  --    Standing up from a chair using your arms (e.g., wheelchair, bedside chair)? 3  -RW 3  -KP  --    Climbing 3-5 steps with a railing? 3  -RW 3  -KP  --    To walk in hospital room? 3  -RW 3  -KP  --    AM-PAC 6 Clicks Score 20  -RW 20  -KP  --       How much help from another is currently needed...    Putting on and taking off regular lower body clothing?  --  -- 4  -KPA    Bathing (including washing, rinsing, and drying)  --  -- 4  -KPA    Toileting (which includes using toilet bed pan or urinal)  --  -- 4  -KPA    Putting on and taking off regular upper body clothing  --  -- 4  -KPA    Taking care of personal grooming (such as brushing teeth)  --  -- 4  -KPA    Eating meals  --  -- 4  -KPA    Score  --  -- 24  -KPA       Functional Assessment    Outcome Measure Options AM-PAC 6 Clicks Basic Mobility (PT)  -RW  -- AM-PAC 6 Clicks Daily Activity (OT)  -KPA      User Key  (r) = Recorded By, (t) = Taken By, (c) = Cosigned By    Initials Name Provider Type    KPA Lili Mccullough, OTR Occupational Therapist    LUIS EDUARDO Renae, PT Physical Therapist    RW Randi De Paz PTA Physical Therapy Assistant           Time Calculation:         PT Charges     Row Name 03/14/18 1039             Time Calculation    Start Time 1026  -RW      Stop Time 1038  -RW      Time Calculation (min) 12 min  -RW      PT Received On 03/14/18  -RW      PT - Next Appointment 03/15/18  -        User Key  (r) = Recorded By, (t) = Taken By, (c) = Cosigned By    Initials Name Provider Type    EULALIA De Paz PTA Physical Therapy Assistant          Therapy Charges for Today     Code Description Service Date Service Provider Modifiers Qty    59431358043 HC PT THER PROC EA 15 MIN 3/14/2018 Randi De Paz PTA GP 1          PT G-Codes  Outcome Measure Options: AM-PAC 6 Clicks Basic Mobility (PT)    Randi De Paz PTA  3/14/2018

## 2018-03-14 NOTE — DISCHARGE SUMMARY
[facility]  Physician Discharge Summary    Patient Identification:  Name: Anibal Freedman  Age: 68 y.o.  Sex: male  :  1950  MRN: [id number]    Admit date: 3/7/2018    Discharge date and time:   3-    Admitting Physician: Dr May    Discharge Physician: Dr May    Discharge Diagnoses:   - a/c biventricular systolic heart failure with volume overload  - cor pulmonale / anasarca  - Cirrhosis/chronic liver disease / ascites    - s/p paracentesis ×2  - CKD III   - Anemia  - alcoholism  - Thrombocytopenia secondary to alcohol abuse  - CAD - h/o BMS LAD, kissing balloon to adjacent diagonal,  BMS-> mid-distal RCA -  - (Permanent) atrial fibrillation -> Eliquis  - s/p recurrent hypomagnesia  - s/p hypokalemia  - h/o atrial flutter ablation         Discharged Condition: stable    Hospital Course:   He presents to the ER reporting a 10 day history of increasing lower extremity edema, increasing abdominal girth and shortness of breath.  He stated that he had been compliant with his medications, but had continued to drink heavily  He reports PND, orthopnea and nonproductive cough.  Denies chest pain, palpitations, nausea, vomiting.  He was found to have significant ascites/anasarca, hypokalemia, hypomagnesia and decompensated biventricular heart failure.  Serial troponins remained negative (indeterminate range) for an acute cardiac event.  Stress test showed no evidence of ischemia.  I spoke with patient and his wife regarding optimize treatment for cardiomyopathy.  I explained that because of his multiple comorbidities, he was deemed not to be a candidate for AICD implant.  LifeVest was ordered    He was placed on IV diuretics.  Nephrology was consulted for assistance with diuretic and electrolyte management due to stage III chronic kidney disease.    Gastroenterology was consulted for ascites refractory to IV diuretics.  He underwent paracentesis on 3/9/2018 (=>19.6  Liters) and again on 3- (=>9.6 Liters) no organisms seen on either set of specimens.    He was evaluated by hematology for thrombocytopenia.  This was felt to be secondary to his alcoholism    He was followed by internal medicine.  Hemoglobin A1c 4.8.  TSH 4.9.  He was counseled regarding need for absolute EtOH cessation.  Repeated conversations occurred between patient, his brother and patient's spouse regarding availability of alcohol detox rehabilitation.  [facility] and psychiatry with patient regarding alcohol treatment.  He did not want to return to the [facility] as he had been there twice however voice interest in [facility] which she wouldn't pursue after discharge.    He was evaluated by OT and PT and deemed to be a good candidate for at least short-term physical rehabilitation.  Arrangements have been made for his transfer to [facility].  I reviewed importance of compliance with all medications, alcohol cessation, compliance with fluid restriction and follow-up appointments with the patient and his spouse.  He is been scheduled to see us in office on April 4 and appointment card was provided.  Further follow-up with PCP, GI and nephrology as ordered.  I reviewed instructions regarding daily weights, fluid restrictions were told to call for recurrent symptoms/weight gain (see below).  All questions were answered.  Both voice understanding and agreement with treatment plan     Consults:   IP CONSULT TO CARDIOLOGY  IP CONSULT TO INTERNAL MEDICINE  IP CONSULT TO NEPHROLOGY  IP CONSULT TO INTERNAL MEDICINE  IP CONSULT TO DIAGNOSTIC RADIOLOGY  IP CONSULT TO GASTROENTEROLOGY  IP CONSULT TO CASE MANAGEMENT   IP CONSULT TO [facility]  IP CONSULT TO HEMATOLOGY AND ONCOLOGY  IP CONSULT TO CASE MANAGEMENT     Discharge Exam:  General:  Awake, alert resting quietly.  Appears chronically ill but, in no acute distress  Eyes: PERTL,  HEENT:  1+ JVD sitting  up at bedside. Oral mucosa moist, no cyanosis  Respiratory: Respirations regular unlabored at rest on room air. BBS with improved -but diminished-air entry in bases..  No crackles or wheezes auscultated  Cardiovascular: S1S2 irregular rate and rhythm. No murmur or rub. Trace pretibial edema  Gastrointestinal: Abdomen soft, mildly rounded with fluid wave. Nontender.  Bowel sounds present.  Musculoskeletal: PEDRO x4. No abnormal movements  Extremities:   Dry dressing to left great toe  Skin: Skin warm and dry to touch on torso  LE cool to touch below.    Neuro: AAO x3 CN II-XII grossly intact   Psych: Mood and affect normal, pleasant and cooperative       Tele:    LABS:    Results from last 7 days  Lab Units 03/14/18  0447   MAGNESIUM mg/dL 2.1       Results from last 7 days  Lab Units 03/14/18  0447   SODIUM mmol/L 139   POTASSIUM mmol/L 4.0   BUN mg/dL 39*   CREATININE mg/dL 1.82*   CALCIUM mg/dL 7.8*       Results from last 7 days  Lab Units 03/13/18  0422 03/12/18  0448 03/11/18  0458   WBC 10*3/mm3 4.31* 3.96* 3.95*   HEMOGLOBIN g/dL 10.3* 10.1* 9.9*   HEMATOCRIT % 32.1* 31.2* 30.8*   PLATELETS 10*3/mm3 119* 103* 100*     Disposition:  [facility]     Discharge Medications:    Tano Anibal CARLITOS   Home Medication Instructions ALLISON:[id number]    Printed on:03/14/18 3181   Medication Information                      allopurinol (ZYLOPRIM) 300 MG tablet  Take 300 mg by mouth Daily.             apixaban (ELIQUIS) 5 MG tablet tablet  Take 1 tablet by mouth Every 12 (Twelve) Hours.             bumetanide (BUMEX) 1 MG tablet  Take 1 tablet by mouth 2 (Two) Times a Day.             ferrous sulfate 325 (65 FE) MG tablet  Take 325 mg by mouth Daily With Breakfast.             FLUoxetine (PROzac) 20 MG capsule  Take 20 mg by mouth daily.             metoprolol tartrate (LOPRESSOR) 25 MG tablet  Take 1 tablet by mouth every 12 (twelve) hours.             multivitamin (THERAGRAN) tablet tablet  Take 1 tablet by mouth Daily.          "    neomycin-bacitracin-polymyxin (NEOSPORIN) 5-400-5000 ointment  Apply 1 application topically Daily.             pantoprazole (PROTONIX) 40 MG EC tablet  Take 40 mg by mouth daily.             potassium chloride (K-DUR,KLOR-CON) 20 MEQ CR tablet  Take 20 mEq by mouth 2 (Two) Times a Day.                   Discharge Home Instructions:  1. Take all medications and treatments as prescribed  2. Complete, absolute EtOH cessation  3. No smoking  4. Weigh self daily and keep record.  Call for >/= 4lb gain in </= 3 days, shortness of breath or swelling  5. Continue 1500 cc per day total fluid restriction with no added salt diet  6. Return to ER for any recurrent symptoms including, but not limited to: chest pain/pressure/tightness, weakness, shortness of breath, dizziness, palpitations, near syncope or syncope swelling, LifeVest discharge  7. Follow up with Dr May at the [facility] office as scheduled on April 4 11:15 AM  8. Follow up with Dr Benavides in 2-3 weeks following paracentesis M.D. for future GI workup to rule out varices in future  9. Follow up with nephrology 2-3 weeks  10.  Follow-up with PCP in one week            I reviewed the patient's new clinical results, discharge treatments, medications and follow up . I personally viewed and interpreted the patient's EKG/Telemetry data  Signed:  Eipfanio May MD  3/14/2018  2:44 PM      EMR Dragon/Transcription:   \"Dictated utilizing Dragon dictation\".     "

## 2018-03-14 NOTE — PROGRESS NOTES
[facility]  Inpatient Progress Note    Reason for Follow Up:  Cirrhosis, ascites    Subjective     Interval History:   No new complaints. Possible discharge to rehab    Current Facility-Administered Medications:   •  acetaminophen (TYLENOL) tablet 650 mg, 650 mg, Oral, Q4H PRN, FRANK Moreno  •  albumin human 25 % IV SOLN 25 g, 25 g, Intravenous, Once, Joseph Trinidad MD  •  allopurinol (ZYLOPRIM) tablet 100 mg, 100 mg, Oral, Daily, Gal Randall MD, 100 mg at 03/13/18 0948  •  apixaban (ELIQUIS) tablet 5 mg, 5 mg, Oral, Q12H, Zakiya Valverde APRN, 5 mg at 03/13/18 2259  •  bumetanide (BUMEX) tablet 1 mg, 1 mg, Oral, BID, Joseph Trinidad MD, 1 mg at 03/13/18 2259  •  FLUoxetine (PROzac) capsule 20 mg, 20 mg, Oral, Daily, FRANK Moreno, 20 mg at 03/13/18 0948  •  folic acid (FOLVITE) tablet 1 mg, 1 mg, Oral, Daily, Gal Randall MD, 1 mg at 03/13/18 0948  •  ipratropium-albuterol (DUO-NEB) nebulizer solution 3 mL, 3 mL, Nebulization, Q4H PRN, Gal Randall MD  •  metoprolol tartrate (LOPRESSOR) tablet 25 mg, 25 mg, Oral, Q12H, FRANK Moreno, 25 mg at 03/13/18 2259  •  midodrine (PROAMATINE) tablet 5 mg, 5 mg, Oral, TID AC, Marlen Benavides MD, 5 mg at 03/14/18 0735  •  multivitamin (THERAGRAN) tablet 1 tablet, 1 tablet, Oral, Daily, Gal Randall MD, 1 tablet at 03/13/18 0948  •  neomycin-bacitracin-polymyxin (NEOSPORIN) ointment 1 application, 1 application, Topical, Daily, Gal Randall MD, 1 application at 03/13/18 0954  •  pantoprazole (PROTONIX) EC tablet 40 mg, 40 mg, Oral, Daily, FRANK Moreno, 40 mg at 03/13/18 0948  •  potassium chloride (MICRO-K) CR capsule 40 mEq, 40 mEq, Oral, PRN, Epifanio May MD, 40 mEq at 03/12/18 0620  •  sodium chloride 0.9 % flush 1-10 mL, 1-10 mL, Intravenous, PRN, FRANK Moreno  •  thiamine (VITAMIN B-1) tablet 100 mg, 100 mg, Oral, Daily, Gal Randall MD, 100 mg at 03/13/18 0948  Review of Systems:    All systems were  reviewed and negative except for:  Constitution:  positive for weakness    Objective     Vital Signs  Temp:  [97.2 °F (36.2 °C)-98.8 °F (37.1 °C)] 98.8 °F (37.1 °C)  Heart Rate:  [49-60] 49  Resp:  [16-17] 17  BP: ()/(67-80) 117/68  Body mass index is 26.78 kg/m².    Intake/Output Summary (Last 24 hours) at 03/14/18 0836  Last data filed at 03/14/18 0818   Gross per 24 hour   Intake              700 ml   Output             9950 ml   Net            -9250 ml     I/O this shift:  In: 60 [P.O.:60]  Out: -      Physical Exam:   General: patient awake, alert and cooperative, appears cachetic, chronically ill   Eyes: Normal lids and lashes, no scleral icterus   Neck: supple, normal ROM   Skin: warm and dry, not jaundiced   Cardiovascular: regular rhythm and rate, no murmurs auscultated   Pulm: clear to auscultation bilaterally, regular and unlabored on room air   Abdomen:soft, notender, nondistended; normal bowel sounds   Rectal: deferred   Extremities: no rash, 1+ edema BLE with stasis   Psychiatric: Normal mood and behavior; memory intact     Results Review:     I reviewed the patient's new clinical results.      Results from last 7 days  Lab Units 03/13/18 0422 03/12/18 0448 03/11/18  0458   WBC 10*3/mm3 4.31* 3.96* 3.95*   HEMOGLOBIN g/dL 10.3* 10.1* 9.9*   HEMATOCRIT % 32.1* 31.2* 30.8*   PLATELETS 10*3/mm3 119* 103* 100*       Results from last 7 days  Lab Units 03/14/18  0447 03/13/18  0422 03/12/18  0448  03/09/18  0412  03/08/18  0601   SODIUM mmol/L 139 137 138  < > 139  --  141   POTASSIUM mmol/L 4.0 4.0 3.6  < > 3.7  < > 4.2   CHLORIDE mmol/L 98 96* 98  < > 92*  --  96*   CO2 mmol/L 30.9* 30.0* 28.3  < > 29.0  --  32.5*   BUN mg/dL 39* 38* 38*  < > 32*  --  27*   CREATININE mg/dL 1.82* 1.82* 2.08*  < > 1.78*  --  1.38*   CALCIUM mg/dL 7.8* 7.8* 8.2*  < > 8.2*  --  8.2*   BILIRUBIN mg/dL  --   --  1.1  --  1.3*  --  1.4*   ALK PHOS U/L  --   --  71  --  99  --  91   ALT (SGPT) U/L  --   --  15  --  14   --  8   AST (SGOT) U/L  --   --  24  --  31  --  20   GLUCOSE mg/dL 101* 96 98  < > 100*  --  93   < > = values in this interval not displayed.    Results from last 7 days  Lab Units 03/12/18  0448 03/09/18  0703 03/07/18  1639   INR  1.64* 1.86* 1.31*     No results found for: LIPASE    Radiology:  US Paracentesis   Final Result   1. Technically successful ultrasound-guided paracentesis.       This report was finalized on 3/13/2018 9:24 AM by Dr. Wilbert Roberson MD.          US Paracentesis   Final Result      XR Chest 2 View   Final Result   Borderline cardiomegaly and small left pleural effusion.       This report was finalized on 3/8/2018 7:56 AM by Dr. Zachery Ngo MD.          CT Abdomen Pelvis Without Contrast   Final Result   1. Small left pleural effusion.   2. Large amount of ascites.   3. Liver appears somewhat small. Please correlate for cirrhosis.       Radiation dose reduction techniques were utilized, including automated   exposure control and exposure modulation based on body size.       This report was finalized on 3/8/2018 7:56 AM by Dr. Zachery Ngo MD.              Assessment/Plan     Patient Active Problem List   Diagnosis   • CHF exacerbation   • Right heart failure   • Ascites of liver   • Hypertension   • Atrial fibrillation   • Alcoholism   • History of coronary angioplasty   • DM2 (diabetes mellitus, type 2)   • Pancytopenia   • Chronic liver disease   • Elevated troponin   • CKD (chronic kidney disease)   • Hypomagnesemia   • Hypokalemia       I discussed the patients findings and my recommendations with patient, family and nursing staff.    FRANK Suarez     Discussed with patient's family at bedside, plans for transfer to rehabilitation noted.  Patient will need follow up in our office, would continue current salt and fluid restriction.  We will defer diuretic therapy to renal service.      Impression  1. Cirrhosis: most likely alcoholic complicated by cardiac  cirrhosis  2. Ascites: repeat paracentesis this morning more consistent with portal htn etiology   3. Alcohol abuse: ongoing prior to admission  4. CHF: cardiology following  5. CKD: stage III, now on bumex  6. A fib: on eliquis    Plan  -diuretics as per renal  -continue midodrine  -continue low sodium diet, 6 small meals daily, daily weights  -will need EGD for variceal screening, q 6 month   -ETOH cessation  -3/22/18 at 9am with Dr. Benavides

## 2018-03-14 NOTE — PROGRESS NOTES
Continued Stay Note  [facility]     Patient Name: Anibal Freedman  MRN: [id number]  Today's Date: 3/14/2018    Admit Date: 3/7/2018          Discharge Plan     Row Name 03/14/18 1451       Plan    Plan [facility], bed available today    Patient/Family in Agreement with Plan yes    Plan Comments Pt discharged. Per [facility], [facility] pt with a bed available today. CCP met with pt and wife who confirm plan for pt to d/c to [facility] for rehab with pt's wife to transport. IMM letter given. Packet in CCP office. Lesley Pereira LCSW              Discharge Codes    No documentation.       Expected Discharge Date and Time     Expected Discharge Date Expected Discharge Time    Mar 14, 2018             Yolande Pereira LCSW

## 2018-03-14 NOTE — PROGRESS NOTES
Case Management Discharge Note    Final Note: Plan [facility] for skilled care.  SAMARA Yip    Destination - Selection Complete     Service Request Status Selected Specialties Address Phone Number Fax Number    [facility] Selected Skilled Nursing Facility [address] [address] [phone]      Durable Medical Equipment     No service coordination in this encounter.      Dialysis/Infusion     No service coordination in this encounter.      Home Medical Care     No service coordination in this encounter.      Social Care     No service coordination in this encounter.        Other: Other (Private Auto)    Final Discharge Disposition Code: 03 - skilled nursing facility (SNF)

## 2018-03-14 NOTE — PLAN OF CARE
Problem: Cardiac: Heart Failure (Adult)  Goal: Signs and Symptoms of Listed Potential Problems Will be Absent, Minimized or Managed (Cardiac: Heart Failure)  Outcome: Ongoing (interventions implemented as appropriate)      Problem: Alcohol Withdrawal Acute, Risk/Actual (Adult)  Goal: Signs and Symptoms of Listed Potential Problems Will be Absent, Minimized or Managed (Alcohol Withdrawal Acute, Risk/Actual)  Outcome: Ongoing (interventions implemented as appropriate)      Problem: Skin Injury Risk (Adult)  Goal: Skin Health and Integrity  Outcome: Ongoing (interventions implemented as appropriate)      Problem: Patient Care Overview  Goal: Plan of Care Review  Outcome: Ongoing (interventions implemented as appropriate)   03/14/18 0538   Coping/Psychosocial   Plan of Care Reviewed With patient   Plan of Care Review   Progress improving   OTHER   Outcome Summary patient transferred from [facility]. no c/o pain. pt afib on the monitor. possible d/c to rehab today. will continue to monitor.

## 2018-03-14 NOTE — PROGRESS NOTES
"Daily progress note    Chief complaint  Doing same  No specific complaints    History of present illness  68-year-old white male who is well-known to our service from multiple admissions in the past with history of chronic kidney disease stage III hypertension diastolic CHF prostate cancer coronary artery disease depression with atrial fibrillation on Eliquis presented to [facility] emergency room with lower extremity swelling and weight gain shortness of breath.  Patient evaluated in ER found to be in decompensated CHF cor pulmonale and anasarca admitted for management.  Patient also continued drink and needs detoxification.  Patient denies any chest pain fever chills cough abdominal pain vomiting diarrhea but he has been nauseated.     REVIEW OF SYSTEMS  Review of Systems   Constitutional: Negative for activity change, appetite change and fever.   HENT: Negative for congestion and sore throat.    Eyes: Negative.    Respiratory: Positive for shortness of breath (on exertion). Negative for cough.    Cardiovascular: Positive for leg swelling (BLE). Negative for chest pain.   Gastrointestinal: Positive for abdominal distention. Negative for abdominal pain, diarrhea and vomiting.   Endocrine: Negative.    Genitourinary: Negative for decreased urine volume and dysuria.   Musculoskeletal: Negative for neck pain.   Skin: Negative for rash and wound.   Allergic/Immunologic: Negative.    Neurological: Negative for weakness, numbness and headaches.   Hematological: Negative.    Psychiatric/Behavioral: Negative.    All other systems reviewed and are negative.     PHYSICAL EXAM  Blood pressure 103/72, pulse (!) 47, temperature 97.7 °F (36.5 °C), temperature source Oral, resp. rate 16, height 180.3 cm (71\"), weight 87.1 kg (192 lb), SpO2 99 %.    Constitutional: He is oriented to person, place, and time and well-developed, well-nourished, and in no distress.   chronically ill appearring, older than stated age   Head: " Normocephalic and atraumatic.   Eyes: EOM are normal. Pupils are equal, round, and reactive to light. No scleral icterus.   Neck: Normal range of motion. Neck supple.   Cardiovascular: Normal rate and normal heart sounds.  An irregularly irregular rhythm present.   Pulmonary/Chest: Effort normal and breath sounds normal. No respiratory distress.   Crackles in the bases of the lungs   Abdominal: Soft. There is no tenderness. There is no rebound and no guarding.   Ascites severe   Musculoskeletal: Normal range of motion. He exhibits edema (2+ in BLE).   Neurological: He is alert and oriented to person, place, and time. He has normal sensation and normal strength.   Skin: Skin is warm and dry.   Psychiatric: Mood and affect normal.     LAB RESULTS  Lab Results (last 24 hours)     Procedure Component Value Units Date/Time    POC Glucose Once [[id number]]  (Abnormal) Collected:  03/14/18 1141    Specimen:  Blood Updated:  03/14/18 1144     Glucose 64 (L) mg/dL     Narrative:       Meter: [id number] : [id number] Jasmyn OCAMPO    Body Fluid Culture - Body Fluid, Peritoneum [[id number]]  (Normal) Collected:  03/13/18 0750    Specimen:  Body Fluid from Peritoneum Updated:  03/14/18 0831     BF Culture No growth     Gram Stain Result Moderate (3+) Red blood cells      Rare (1+) WBCs seen      No organisms seen    Body Fluid Culture - Body Fluid, Peritoneum [[id number]]  (Normal) Collected:  03/09/18 1410    Specimen:  Body Fluid from Peritoneum Updated:  03/14/18 0813     BF Culture No growth at 5 days     Gram Stain Result Few (2+) WBCs seen      No organisms seen    POC Glucose Once [[id number]]  (Normal) Collected:  03/14/18 0724    Specimen:  Blood Updated:  03/14/18 0727     Glucose 75 mg/dL     Narrative:       Meter: [id number] : [id number] Jasmyn OCAMPO    Basic Metabolic Panel [[id number]]  (Abnormal) Collected:  03/14/18 0447    Specimen:  Blood Updated:  03/14/18 0607     Glucose 101 (H) mg/dL      BUN  39 (H) mg/dL      Creatinine 1.82 (H) mg/dL      Sodium 139 mmol/L      Potassium 4.0 mmol/L      Chloride 98 mmol/L      CO2 30.9 (H) mmol/L      Calcium 7.8 (L) mg/dL      eGFR Non African Amer 37 (L) mL/min/1.73      BUN/Creatinine Ratio 21.4     Anion Gap 10.1 mmol/L     Narrative:       GFR Normal >60  Chronic Kidney Disease <60  Kidney Failure <15    Magnesium [[id number]]  (Normal) Collected:  03/14/18 0447    Specimen:  Blood Updated:  03/14/18 0606     Magnesium 2.1 mg/dL     BNP [[id number]]  (Abnormal) Collected:  03/14/18 0447    Specimen:  Blood Updated:  03/14/18 0605     proBNP 11,097.0 (H) pg/mL     Narrative:       Among patients with dyspnea, NT-proBNP is highly sensitive for the detection of acute congestive heart failure. In addition NT-proBNP of <300 pg/ml effectively rules out acute congestive heart failure with 99% negative predictive value.    POC Glucose Once [[id number]]  (Normal) Collected:  03/13/18 1723    Specimen:  Blood Updated:  03/13/18 1724     Glucose 109 mg/dL     Narrative:       Meter: [id number] : [id number] Alce ARCEO        Imaging Results (last 24 hours)     ** No results found for the last 24 hours. **        EKG:  Rate: 95  afib with IVCD, frequent PVCs,   diffuse nonspecific ST and atT wave changes, difference from. 08 2016 when he was in a narrow complex, otherwise similar..      Current Facility-Administered Medications:   •  acetaminophen (TYLENOL) tablet 650 mg, 650 mg, Oral, Q4H PRN, FRANK Moreno  •  albumin human 25 % IV SOLN 25 g, 25 g, Intravenous, Once, Joseph Trinidad MD  •  allopurinol (ZYLOPRIM) tablet 100 mg, 100 mg, Oral, Daily, Gal Randall MD, 100 mg at 03/14/18 0849  •  apixaban (ELIQUIS) tablet 5 mg, 5 mg, Oral, Q12H, FRANK Moreno, 5 mg at 03/14/18 0849  •  bumetanide (BUMEX) tablet 1 mg, 1 mg, Oral, BID, Joseph Trinidad MD, 1 mg at 03/14/18 0849  •  FLUoxetine (PROzac) capsule 20 mg, 20 mg, Oral, Daily, Zakiya M Strain,  APRN, 20 mg at 03/14/18 0849  •  folic acid (FOLVITE) tablet 1 mg, 1 mg, Oral, Daily, Victor Manuel Valdivia MD, 1 mg at 03/14/18 0849  •  ipratropium-albuterol (DUO-NEB) nebulizer solution 3 mL, 3 mL, Nebulization, Q4H PRN, Victor Manuel Valdivia MD  •  metoprolol tartrate (LOPRESSOR) tablet 25 mg, 25 mg, Oral, Q12H, FRANK Moreno, 25 mg at 03/14/18 0850  •  midodrine (PROAMATINE) tablet 5 mg, 5 mg, Oral, TID AC, Marlen Benavides MD, 5 mg at 03/14/18 1222  •  multivitamin (THERAGRAN) tablet 1 tablet, 1 tablet, Oral, Daily, Victor Manuel Valdivia MD, 1 tablet at 03/14/18 0849  •  neomycin-bacitracin-polymyxin (NEOSPORIN) ointment 1 application, 1 application, Topical, Daily, Victor Manuel Valdivia MD, 1 application at 03/13/18 0954  •  pantoprazole (PROTONIX) EC tablet 40 mg, 40 mg, Oral, Daily, FRANK Moreno, 40 mg at 03/14/18 1012  •  potassium chloride (MICRO-K) CR capsule 40 mEq, 40 mEq, Oral, PRN, Epifanio May MD, 40 mEq at 03/12/18 0620  •  sodium chloride 0.9 % flush 1-10 mL, 1-10 mL, Intravenous, PRN, FRANK Moreno  •  thiamine (VITAMIN B-1) tablet 100 mg, 100 mg, Oral, Daily, Victor Manuel Valdivia MD, 100 mg at 03/14/18 0849     ASSESSMENT  Decompensated CHF  Anasarca  Volume overload  Cirrhosis with ascites status post paracentesis  Hypertension  Alcohol abuse  Pancytopenia secondary to alcohol abuse  Elevated troponin with known coronary artery disease  Prostate cancer  Depression  Paroxysmal atrial fibrillation on ELIQUIS    PLAN  CPM  Diuresis per nephrology  Resume Eliquis  Continue home medication and adjust the doses  Detox with alcohol withdrawal precautions  Supportive care  Stress ulcer prophylaxis  Okay to discharge    VICTOR MANUEL VALDIVIA MD

## 2018-03-14 NOTE — NURSING NOTE
Pt blood sugar 64 before lunch.  Lunch tray on floor and given to patient.  Will continue to monitor.  ANDREW Ayala RN

## 2018-03-15 NOTE — PROGRESS NOTES
Continued Stay Note  [facility]     Patient Name: Anibal Freedman  MRN: [id number]  Today's Date: 3/15/2018    Admit Date: 3/7/2018          Discharge Plan     Row Name 03/15/18 0855       Plan    Plan Comments Elzbieta (Life Vest) placed Life Vest but pt was unable to push button.  Pt was discharged without the vest.  Plan [facility] for skilled care.  SAMARA Daniel              Discharge Codes    No documentation.       Expected Discharge Date and Time     Expected Discharge Date Expected Discharge Time    Mar 14, 2018             Lizzie Barnes, RN

## 2018-03-18 LAB
BACTERIA FLD CULT: NORMAL
GRAM STN SPEC: NORMAL

## 2018-03-28 ENCOUNTER — TELEPHONE (OUTPATIENT)
Dept: GASTROENTEROLOGY | Facility: CLINIC | Age: 68
End: 2018-03-28

## 2018-03-28 DIAGNOSIS — K74.60 CIRRHOSIS OF LIVER WITH ASCITES, UNSPECIFIED HEPATIC CIRRHOSIS TYPE (HCC): Primary | ICD-10-CM

## 2018-03-28 DIAGNOSIS — R18.8 CIRRHOSIS OF LIVER WITH ASCITES, UNSPECIFIED HEPATIC CIRRHOSIS TYPE (HCC): Primary | ICD-10-CM

## 2018-03-28 NOTE — TELEPHONE ENCOUNTER
Can we get him set up for a paracentesis with fluid studies-- cell count and diff, along with fluid culture.      Will need to call Dr May to make sure we can hold his eliquis for 48 h before the procedure (so hopefully could hold today and have it done Friday)

## 2018-03-28 NOTE — TELEPHONE ENCOUNTER
Received call from nurse Obrien from [facility] ( [phone] ).  She reports that since the pt's admission there on 03/14/2018, the pt has gained 32lbs.  She reports they have increased the pt's diuretic ( bumex to 2mg bid).  She reports the pt's abd has increased in size, they have not measured.  He does have some edema in his legs , but mostly fluid is in pt's abdomen.  She states they think the pt needs a paracentesis.   Advised would send message to Dr Benavides and in meantime if pt symptoms worsen to send him to ER.   Nurse verb understanding.

## 2018-03-28 NOTE — TELEPHONE ENCOUNTER
Called Dr May's office at [phone] and spoke with nurse Lopez.  She states she will speak with MD and will let us know if pt can hold eliquis for 48hrs.  Advised we are trying to get pt scheduled for a paracentesis for Friday due to a 32 lb weight gain.  Advised if we can find out today , pt could hold his dose.    She verb understanding .     Called Camille  And spoke with Yesy due to Camille was busy at [phone].  Adviised that we are waiting to hear back from Dr May's office regarding holding Eliquis.  She verb understanding and states they will hold this afternoons dose of eliquis until she hears from us.

## 2018-03-28 NOTE — TELEPHONE ENCOUNTER
Jessica called from Dr May's office and advised that the pt can hold eliquis for 48hrs prior to paracentesis.  .      Order for paracentesis placed thru [facility].  Called [facility] scheduling and spoke with Monae and arranged for paracentesis for Friday 03/30 at 7am.  Pt is to have nothing to eat or drink after MN on Thursday.     Called [facility] and spoke Janel and advised of the above.  Also gave her scheduling's number [phone] to call if any problems.  She verb understanding and reports that the pt may be getting dc'd home tomorrow.  Advised they may want to keep the pt so he can be monitored another day due to the increase in fluid.  She reports she will see what his insurance says.  Update sent to Dr Benavides.

## 2018-03-30 ENCOUNTER — TELEPHONE (OUTPATIENT)
Dept: GASTROENTEROLOGY | Facility: CLINIC | Age: 68
End: 2018-03-30

## 2018-03-30 ENCOUNTER — HOSPITAL ENCOUNTER (OUTPATIENT)
Dept: ULTRASOUND IMAGING | Facility: HOSPITAL | Age: 68
Discharge: HOME | End: 2018-03-30
Attending: INTERNAL MEDICINE | Admitting: RADIOLOGY

## 2018-03-30 VITALS
OXYGEN SATURATION: 100 % | HEART RATE: 56 BPM | DIASTOLIC BLOOD PRESSURE: 61 MMHG | SYSTOLIC BLOOD PRESSURE: 95 MMHG | HEIGHT: 71 IN | BODY MASS INDEX: 29.12 KG/M2 | WEIGHT: 208 LBS | TEMPERATURE: 97 F | RESPIRATION RATE: 18 BRPM

## 2018-03-30 DIAGNOSIS — K74.60 CIRRHOSIS OF LIVER WITH ASCITES, UNSPECIFIED HEPATIC CIRRHOSIS TYPE (HCC): ICD-10-CM

## 2018-03-30 DIAGNOSIS — R18.8 CIRRHOSIS OF LIVER WITH ASCITES, UNSPECIFIED HEPATIC CIRRHOSIS TYPE (HCC): ICD-10-CM

## 2018-03-30 LAB
APPEARANCE FLD: ABNORMAL
COLOR FLD: YELLOW
INR PPP: 1.1 (ref 0.8–1.2)
LYMPHOCYTES NFR FLD MANUAL: 26 %
MONOCYTES NFR FLD: 50 %
MONOS+MACROS NFR FLD: 6 %
NEUTROPHILS NFR FLD MANUAL: 18 %
PROTHROMBIN TIME: 13.6 SECONDS (ref 12.8–15.2)
RBC # FLD AUTO: 112 /MM3
WBC # FLD: 138 /MM3

## 2018-03-30 PROCEDURE — 25010000002 ALBUMIN HUMAN 25% PER 50 ML: Performed by: INTERNAL MEDICINE

## 2018-03-30 PROCEDURE — 96549 UNLISTED CHEMOTHERAPY PX: CPT

## 2018-03-30 PROCEDURE — 87070 CULTURE OTHR SPECIMN AEROBIC: CPT | Performed by: INTERNAL MEDICINE

## 2018-03-30 PROCEDURE — 89051 BODY FLUID CELL COUNT: CPT | Performed by: INTERNAL MEDICINE

## 2018-03-30 PROCEDURE — P9046 ALBUMIN (HUMAN), 25%, 20 ML: HCPCS | Performed by: INTERNAL MEDICINE

## 2018-03-30 PROCEDURE — 96366 THER/PROPH/DIAG IV INF ADDON: CPT

## 2018-03-30 PROCEDURE — 87205 SMEAR GRAM STAIN: CPT | Performed by: INTERNAL MEDICINE

## 2018-03-30 PROCEDURE — 76942 ECHO GUIDE FOR BIOPSY: CPT

## 2018-03-30 PROCEDURE — 96365 THER/PROPH/DIAG IV INF INIT: CPT

## 2018-03-30 PROCEDURE — 87015 SPECIMEN INFECT AGNT CONCNTJ: CPT | Performed by: INTERNAL MEDICINE

## 2018-03-30 RX ORDER — ALBUMIN (HUMAN) 12.5 G/50ML
104 SOLUTION INTRAVENOUS ONCE
Status: COMPLETED | OUTPATIENT
Start: 2018-03-30 | End: 2018-03-30

## 2018-03-30 RX ORDER — LIDOCAINE HYDROCHLORIDE 10 MG/ML
20 INJECTION, SOLUTION INFILTRATION; PERINEURAL ONCE
Status: COMPLETED | OUTPATIENT
Start: 2018-03-30 | End: 2018-03-30

## 2018-03-30 RX ADMIN — LIDOCAINE HYDROCHLORIDE 10 ML: 10 INJECTION, SOLUTION INFILTRATION; PERINEURAL at 08:11

## 2018-03-30 RX ADMIN — ALBUMIN HUMAN 104 G: 0.25 SOLUTION INTRAVENOUS at 09:39

## 2018-03-30 NOTE — TELEPHONE ENCOUNTER
Called pt's wife and she reports that the rehab was dc'd  home yesterday from rehab..  She states he had parcentesis today and they removed 14L.  Pt's wife is worried that he may need another paracentesis before his appt on 04/13 with Dr Benavides.  She is also concerned about the fact that they increased his bumex to 2mg tid.  Was able to move pt's appt to 04/06 with Dr Benavides.  Also advised her to make sure the pt weighs every day and measures his belly.  Advised to keep a record ot this so they can see how it is trending. Advised would send update Dr Benavides and in the meantime if pt's symptoms worsen to go to ER.

## 2018-03-30 NOTE — TELEPHONE ENCOUNTER
----- Message from Wendy Dobbins sent at 3/30/2018  3:00 PM EDT -----  Regarding: Pt wife vianca   Contact: [phone]  Pt wife is calling stating wanting to speak with the nurse reguarding his PARACENTESIS.

## 2018-03-31 ENCOUNTER — TELEPHONE (OUTPATIENT)
Dept: INTERVENTIONAL RADIOLOGY/VASCULAR | Facility: HOSPITAL | Age: 68
End: 2018-03-31

## 2018-04-03 ENCOUNTER — TELEPHONE (OUTPATIENT)
Dept: GASTROENTEROLOGY | Facility: CLINIC | Age: 68
End: 2018-04-03

## 2018-04-03 ENCOUNTER — TELEPHONE (OUTPATIENT)
Dept: CARDIOLOGY | Facility: CLINIC | Age: 68
End: 2018-04-03

## 2018-04-03 NOTE — TELEPHONE ENCOUNTER
Wife Anh states that Mr Freedman has gained 8lbs since yesterday  Waist has increased by 1 inch.  Has started drinking etoh on Sunday.   Just had paracentesis on 3/30/18 took off 14L.  From which eliquis had to be held.   Needs to be advised on what needs to be done re: weight gain. Denies any other symptoms.    Instructed to call dr Benavides office and keep appt scheduled with dr jose tomorrow.

## 2018-04-04 ENCOUNTER — OFFICE VISIT (OUTPATIENT)
Dept: CARDIOLOGY | Facility: CLINIC | Age: 68
End: 2018-04-04

## 2018-04-04 VITALS
WEIGHT: 195 LBS | BODY MASS INDEX: 27.21 KG/M2 | SYSTOLIC BLOOD PRESSURE: 129 MMHG | DIASTOLIC BLOOD PRESSURE: 88 MMHG | HEART RATE: 72 BPM

## 2018-04-04 DIAGNOSIS — I50.23 ACUTE ON CHRONIC SYSTOLIC CONGESTIVE HEART FAILURE (HCC): Primary | ICD-10-CM

## 2018-04-04 DIAGNOSIS — I10 ESSENTIAL HYPERTENSION: ICD-10-CM

## 2018-04-04 DIAGNOSIS — Z91.199 NONCOMPLIANCE: ICD-10-CM

## 2018-04-04 LAB
BACTERIA FLD CULT: NORMAL
GRAM STN SPEC: NORMAL
GRAM STN SPEC: NORMAL

## 2018-04-04 PROCEDURE — 93000 ELECTROCARDIOGRAM COMPLETE: CPT | Performed by: INTERNAL MEDICINE

## 2018-04-04 PROCEDURE — 99213 OFFICE O/P EST LOW 20 MIN: CPT | Performed by: INTERNAL MEDICINE

## 2018-04-04 NOTE — PROGRESS NOTES
Subjective:       Anibal Freedman is a 68 y.o. male who here for follow up    CC  HOSP FOLLOW UP  HPI  68-year-old male was recently admitted to the hospital with the congestive heart failure, known to have the benign essential arterial hypertension as well as systolic heart failure as well as atrial fibrillation, highly noncompliant continues to complains of shortness of breath on exertion with no chest pains or tightness in chest     Problem List Items Addressed This Visit        Cardiovascular and Mediastinum    CHF exacerbation - Primary    Hypertension      Other Visit Diagnoses    None.       .    The following portions of the patient's history were reviewed and updated as appropriate: allergies, current medications, past family history, past medical history, past social history, past surgical history and problem list.    Past Medical History:   Diagnosis Date   • Alcoholism    • Arthritis    • Atrial fibrillation     pt reported   • Cellulitis    • CHF (congestive heart failure)    • Colon polyps 08/21/2006    Colonoscopy w/ snare cautery, polypectomy & biopsy, PATH:  Sigmoid Colon Biopsy: focal vascular congestion & evidence of recent hemorrhage, non-specific.  Recto-Sigmoid Colon Biopsy: Fragments of tubular adenoam w/ mild dysplasia. Dr. Mildred You   • DM2 (diabetes mellitus, type 2) 3/7/2018   • History of coronary angioplasty    • History of MRSA infection 2005    pt reported   • History of staph infection 08/02/2005   • Hypertension    • Infectious viral hepatitis     pt reported   • Prostate cancer 04/28/2010    S/P Radical Prostectomy, Adenocarcinoma, Primary [place] Grade 3, Secondar [place] Grade 3. Combined yissel score 6. Tumor involves rt & lt lobes, negative capsular margin, no invasion of seminal vesicles, no identified perineural invastion   • Withdrawal symptoms, alcohol     reports that he quit smoking about 11 years ago. He smoked 1.50 packs per day. He does not have any smokeless  tobacco history on file. He reports that he drinks about 3.6 oz of alcohol per week . He reports that he does not use drugs.  Family History   Problem Relation Age of Onset   • Heart disease Mother    • Alcohol abuse Father    • Liver cancer Father    • Cancer Sister    • Hypertension Brother    • Alcohol abuse Brother    • Skin cancer Brother        Review of Systems  Constitutional: No wt loss, fever, fatigue  Gastrointestinal: No nausea, abdominal pain  Behavioral/Psych: No insomnia or anxiety   Cardiovascular shortness of breath  Objective:       Physical Exam             Physical Exam  /88   Pulse 72   Wt 88.5 kg (195 lb)   BMI 27.21 kg/m²     General appearance: NAD, conversant   Eyes: anicteric sclerae, moist conjunctivae; no lid-lag; PERRLA   HENT: Atraumatic; oropharynx clear with moist mucous membranes and no mucosal ulcerations;  normal hard and soft palate   Neck: Trachea midline; FROM, supple, no thyromegaly or lymphadenopathy   Lungs: CTA, with normal respiratory effort and no intercostal retractions   CV: S1-S2 regular, no murmurs, no rub, no gallop   Abdomen: Soft, non-tender; no masses or HSM   Extremities: No peripheral edema or extremity lymphadenopathy  Skin: Normal temperature, turgor and texture; no rash, ulcers or subcutaneous nodules   Psych: Appropriate affect, alert and oriented to person, place and time           Cardiographics  @  ECG 12 Lead  Date/Time: 4/4/2018 11:51 AM  Performed by: ROSINA SINGH  Authorized by: ROSINA SINGH   Comparison: compared with previous ECG   Similar to previous ECG  Rhythm: sinus rhythm  ST Flattening: all  Clinical impression: non-specific ECG            Echocardiogram:        Current Outpatient Prescriptions:   •  allopurinol (ZYLOPRIM) 300 MG tablet, Take 300 mg by mouth Daily., Disp: , Rfl:   •  apixaban (ELIQUIS) 5 MG tablet tablet, Take 1 tablet by mouth Every 12 (Twelve) Hours., Disp: 60 tablet, Rfl: 3  •  bumetanide (BUMEX) 1  MG tablet, Take 1 tablet by mouth 2 (Two) Times a Day., Disp: 60 tablet, Rfl: 3  •  ferrous sulfate 325 (65 FE) MG tablet, Take 325 mg by mouth Daily With Breakfast., Disp: , Rfl:   •  FLUoxetine (PROzac) 20 MG capsule, Take 20 mg by mouth daily., Disp: , Rfl:   •  metoprolol tartrate (LOPRESSOR) 25 MG tablet, Take 1 tablet by mouth every 12 (twelve) hours., Disp: 60 tablet, Rfl: 0  •  neomycin-bacitracin-polymyxin (NEOSPORIN) 5-400-5000 ointment, Apply 1 application topically Daily., Disp: 1 tube, Rfl: 0  •  pantoprazole (PROTONIX) 40 MG EC tablet, Take 40 mg by mouth daily., Disp: , Rfl:   •  potassium chloride (K-DUR,KLOR-CON) 20 MEQ CR tablet, Take 20 mEq by mouth 2 (Two) Times a Day., Disp: , Rfl:    Assessment:        Patient Active Problem List   Diagnosis   • CHF exacerbation   • Right heart failure   • Ascites of liver   • Hypertension   • Atrial fibrillation   • Alcoholism   • History of coronary angioplasty   • DM2 (diabetes mellitus, type 2)   • Pancytopenia   • Chronic liver disease   • Elevated troponin   • CKD (chronic kidney disease)   • Hypomagnesemia   • Hypokalemia     Interpretation Summary     · Left ventricular wall thickness is consistent with mild concentric hypertrophy.  · The following left ventricular wall segments are hypokinetic: mid anterior, apical septal, basal inferoseptal, mid inferoseptal, apex hypokinetic, mid anteroseptal, basal anterior and basal inferoseptal.  · Right ventricular cavity is moderately dilated.  · Left atrial cavity size is moderately dilated.  · Mild pulmonic valve regurgitation is present.  · Mild to moderate tricuspid valve regurgitation is present.  · Mild mitral valve regurgitation is present  · Mild aortic valve regurgitation is present.  · Left Ventricle: Calculated EF = 22.5%.         Summary     · Myocardial perfusion imaging indicates a normal myocardial perfusion study with no evidence of ischemia.  · Left ventricular ejection fraction is moderately  reduced (Calculated EF = 36%).  · Impressions are consistent with a low risk study.           Plan:            ICD-10-CM ICD-9-CM   1. Acute on chronic systolic congestive heart failure I50.23 428.23     428.0   2. Essential hypertension I10 401.9   3. Noncompliance Z91.19 V15.81     1. Acute on chronic systolic congestive heart failure  Compensated    2. Essential hypertension  Under control    3. Noncompliance  Had long discussion, considering significant social issues, at this point has been decided not to proceed with AICD       STILL DRINKING ALCOHOL    NO  AICD    ON ANTI COAGULATION, WILL BLEED MORE DUE TO LIVER DUE TO ALCOHOL    POOR PROGNOSIS    SEE US 3 MONTHS  COUNSELING:    Anibal Garcia was given to patient for the following topics: diagnostic results, risk factor reductions, impressions, risks and benefits of treatment options and importance of treatment compliance .       SMOKING COUNSELING:    Counseling given: Yes      EMR Dragon/Transcription disclaimer:   Much of this encounter note is an electronic transcription/translation of spoken language to printed text. The electronic translation of spoken language may permit erroneous, or at times, nonsensical words or phrases to be inadvertently transcribed; Although I have reviewed the note for such errors, some may still exist.

## 2018-04-16 PROBLEM — Z91.199 NONCOMPLIANCE: Status: ACTIVE | Noted: 2018-04-16

## 2018-05-25 ENCOUNTER — APPOINTMENT (OUTPATIENT)
Dept: GENERAL RADIOLOGY | Facility: HOSPITAL | Age: 68
End: 2018-05-25

## 2018-05-25 ENCOUNTER — HOSPITAL ENCOUNTER (INPATIENT)
Facility: HOSPITAL | Age: 68
LOS: 7 days | Discharge: HOME | End: 2018-06-01
Attending: EMERGENCY MEDICINE | Admitting: HOSPITALIST

## 2018-05-25 DIAGNOSIS — R18.8 ASCITES OF LIVER: ICD-10-CM

## 2018-05-25 DIAGNOSIS — R60.1 ANASARCA: ICD-10-CM

## 2018-05-25 DIAGNOSIS — I50.9 ACUTE ON CHRONIC CONGESTIVE HEART FAILURE, UNSPECIFIED CONGESTIVE HEART FAILURE TYPE: Primary | ICD-10-CM

## 2018-05-25 LAB
ALBUMIN SERPL-MCNC: 3.7 G/DL (ref 3.5–5.2)
ALBUMIN/GLOB SERPL: 1.3 G/DL
ALP SERPL-CCNC: 99 U/L (ref 39–117)
ALT SERPL W P-5'-P-CCNC: 13 U/L (ref 1–41)
ANION GAP SERPL CALCULATED.3IONS-SCNC: 19 MMOL/L
APTT PPP: 28.3 SECONDS (ref 22.7–35.4)
AST SERPL-CCNC: 25 U/L (ref 1–40)
BASOPHILS # BLD AUTO: 0.01 10*3/MM3 (ref 0–0.2)
BASOPHILS NFR BLD AUTO: 0.1 % (ref 0–1.5)
BILIRUB SERPL-MCNC: 1.2 MG/DL (ref 0.1–1.2)
BUN BLD-MCNC: 28 MG/DL (ref 8–23)
BUN/CREAT SERPL: 23.1 (ref 7–25)
CALCIUM SPEC-SCNC: 8.5 MG/DL (ref 8.6–10.5)
CHLORIDE SERPL-SCNC: 95 MMOL/L (ref 98–107)
CO2 SERPL-SCNC: 24 MMOL/L (ref 22–29)
CREAT BLD-MCNC: 1.21 MG/DL (ref 0.76–1.27)
DEPRECATED RDW RBC AUTO: 52.8 FL (ref 37–54)
EOSINOPHIL # BLD AUTO: 0.02 10*3/MM3 (ref 0–0.7)
EOSINOPHIL NFR BLD AUTO: 0.3 % (ref 0.3–6.2)
ERYTHROCYTE [DISTWIDTH] IN BLOOD BY AUTOMATED COUNT: 14.8 % (ref 11.5–14.5)
GFR SERPL CREATININE-BSD FRML MDRD: 59 ML/MIN/1.73
GLOBULIN UR ELPH-MCNC: 2.8 GM/DL
GLUCOSE BLD-MCNC: 130 MG/DL (ref 65–99)
HCT VFR BLD AUTO: 37.5 % (ref 40.4–52.2)
HGB BLD-MCNC: 12.3 G/DL (ref 13.7–17.6)
IMM GRANULOCYTES # BLD: 0.02 10*3/MM3 (ref 0–0.03)
IMM GRANULOCYTES NFR BLD: 0.3 % (ref 0–0.5)
INR PPP: 1.19 (ref 0.9–1.1)
LYMPHOCYTES # BLD AUTO: 1.16 10*3/MM3 (ref 0.9–4.8)
LYMPHOCYTES NFR BLD AUTO: 14.9 % (ref 19.6–45.3)
MCH RBC QN AUTO: 32.1 PG (ref 27–32.7)
MCHC RBC AUTO-ENTMCNC: 32.8 G/DL (ref 32.6–36.4)
MCV RBC AUTO: 97.9 FL (ref 79.8–96.2)
MONOCYTES # BLD AUTO: 0.22 10*3/MM3 (ref 0.2–1.2)
MONOCYTES NFR BLD AUTO: 2.8 % (ref 5–12)
NEUTROPHILS # BLD AUTO: 6.33 10*3/MM3 (ref 1.9–8.1)
NEUTROPHILS NFR BLD AUTO: 81.6 % (ref 42.7–76)
NT-PROBNP SERPL-MCNC: ABNORMAL PG/ML (ref 0–900)
PLATELET # BLD AUTO: 198 10*3/MM3 (ref 140–500)
PMV BLD AUTO: 10.7 FL (ref 6–12)
POTASSIUM BLD-SCNC: 4 MMOL/L (ref 3.5–5.2)
PROT SERPL-MCNC: 6.5 G/DL (ref 6–8.5)
PROTHROMBIN TIME: 14.9 SECONDS (ref 11.7–14.2)
RBC # BLD AUTO: 3.83 10*6/MM3 (ref 4.6–6)
SODIUM BLD-SCNC: 138 MMOL/L (ref 136–145)
TROPONIN T SERPL-MCNC: 0.03 NG/ML (ref 0–0.03)
WBC NRBC COR # BLD: 7.76 10*3/MM3 (ref 4.5–10.7)

## 2018-05-25 PROCEDURE — 71046 X-RAY EXAM CHEST 2 VIEWS: CPT

## 2018-05-25 PROCEDURE — 93005 ELECTROCARDIOGRAM TRACING: CPT | Performed by: EMERGENCY MEDICINE

## 2018-05-25 PROCEDURE — 85610 PROTHROMBIN TIME: CPT | Performed by: EMERGENCY MEDICINE

## 2018-05-25 PROCEDURE — 99284 EMERGENCY DEPT VISIT MOD MDM: CPT

## 2018-05-25 PROCEDURE — 84484 ASSAY OF TROPONIN QUANT: CPT | Performed by: EMERGENCY MEDICINE

## 2018-05-25 PROCEDURE — 83880 ASSAY OF NATRIURETIC PEPTIDE: CPT | Performed by: EMERGENCY MEDICINE

## 2018-05-25 PROCEDURE — 85730 THROMBOPLASTIN TIME PARTIAL: CPT | Performed by: EMERGENCY MEDICINE

## 2018-05-25 PROCEDURE — 93010 ELECTROCARDIOGRAM REPORT: CPT | Performed by: INTERNAL MEDICINE

## 2018-05-25 PROCEDURE — 25010000002 FUROSEMIDE PER 20 MG: Performed by: EMERGENCY MEDICINE

## 2018-05-25 PROCEDURE — 25010000002 HYDRALAZINE PER 20 MG: Performed by: HOSPITALIST

## 2018-05-25 PROCEDURE — 80053 COMPREHEN METABOLIC PANEL: CPT | Performed by: EMERGENCY MEDICINE

## 2018-05-25 PROCEDURE — 85025 COMPLETE CBC W/AUTO DIFF WBC: CPT | Performed by: EMERGENCY MEDICINE

## 2018-05-25 RX ORDER — PANTOPRAZOLE SODIUM 40 MG/1
40 TABLET, DELAYED RELEASE ORAL
Status: DISCONTINUED | OUTPATIENT
Start: 2018-05-26 | End: 2018-05-26

## 2018-05-25 RX ORDER — BUMETANIDE 0.25 MG/ML
2 INJECTION INTRAMUSCULAR; INTRAVENOUS EVERY 12 HOURS
Status: DISCONTINUED | OUTPATIENT
Start: 2018-05-25 | End: 2018-05-26

## 2018-05-25 RX ORDER — HYDRALAZINE HYDROCHLORIDE 20 MG/ML
20 INJECTION INTRAMUSCULAR; INTRAVENOUS 2 TIMES DAILY
Status: DISCONTINUED | OUTPATIENT
Start: 2018-05-25 | End: 2018-05-26

## 2018-05-25 RX ORDER — FUROSEMIDE 10 MG/ML
80 INJECTION INTRAMUSCULAR; INTRAVENOUS ONCE
Status: COMPLETED | OUTPATIENT
Start: 2018-05-25 | End: 2018-05-25

## 2018-05-25 RX ORDER — SODIUM CHLORIDE 0.9 % (FLUSH) 0.9 %
10 SYRINGE (ML) INJECTION AS NEEDED
Status: DISCONTINUED | OUTPATIENT
Start: 2018-05-25 | End: 2018-06-01 | Stop reason: HOSPADM

## 2018-05-25 RX ADMIN — FUROSEMIDE 80 MG: 10 INJECTION, SOLUTION INTRAMUSCULAR; INTRAVENOUS at 20:36

## 2018-05-25 RX ADMIN — Medication 20 MG: at 23:37

## 2018-05-25 RX ADMIN — BUMETANIDE 2 MG: 0.25 INJECTION INTRAMUSCULAR; INTRAVENOUS at 23:37

## 2018-05-26 LAB
ANION GAP SERPL CALCULATED.3IONS-SCNC: 20.1 MMOL/L
BASOPHILS # BLD AUTO: 0.01 10*3/MM3 (ref 0–0.2)
BASOPHILS NFR BLD AUTO: 0.1 % (ref 0–1.5)
BUN BLD-MCNC: 25 MG/DL (ref 8–23)
BUN/CREAT SERPL: 21.7 (ref 7–25)
CALCIUM SPEC-SCNC: 8.7 MG/DL (ref 8.6–10.5)
CHLORIDE SERPL-SCNC: 95 MMOL/L (ref 98–107)
CO2 SERPL-SCNC: 23.9 MMOL/L (ref 22–29)
CREAT BLD-MCNC: 1.15 MG/DL (ref 0.76–1.27)
DEPRECATED RDW RBC AUTO: 51.5 FL (ref 37–54)
EOSINOPHIL # BLD AUTO: 0.03 10*3/MM3 (ref 0–0.7)
EOSINOPHIL NFR BLD AUTO: 0.4 % (ref 0.3–6.2)
ERYTHROCYTE [DISTWIDTH] IN BLOOD BY AUTOMATED COUNT: 14.5 % (ref 11.5–14.5)
GFR SERPL CREATININE-BSD FRML MDRD: 63 ML/MIN/1.73
GLUCOSE BLD-MCNC: 80 MG/DL (ref 65–99)
HCT VFR BLD AUTO: 34.6 % (ref 40.4–52.2)
HGB BLD-MCNC: 11.4 G/DL (ref 13.7–17.6)
IMM GRANULOCYTES # BLD: 0.02 10*3/MM3 (ref 0–0.03)
IMM GRANULOCYTES NFR BLD: 0.3 % (ref 0–0.5)
LYMPHOCYTES # BLD AUTO: 0.84 10*3/MM3 (ref 0.9–4.8)
LYMPHOCYTES NFR BLD AUTO: 12.3 % (ref 19.6–45.3)
MCH RBC QN AUTO: 31.9 PG (ref 27–32.7)
MCHC RBC AUTO-ENTMCNC: 32.9 G/DL (ref 32.6–36.4)
MCV RBC AUTO: 96.9 FL (ref 79.8–96.2)
MONOCYTES # BLD AUTO: 0.78 10*3/MM3 (ref 0.2–1.2)
MONOCYTES NFR BLD AUTO: 11.4 % (ref 5–12)
NEUTROPHILS # BLD AUTO: 5.14 10*3/MM3 (ref 1.9–8.1)
NEUTROPHILS NFR BLD AUTO: 75.5 % (ref 42.7–76)
PLATELET # BLD AUTO: 179 10*3/MM3 (ref 140–500)
PMV BLD AUTO: 10.8 FL (ref 6–12)
POTASSIUM BLD-SCNC: 3.6 MMOL/L (ref 3.5–5.2)
RBC # BLD AUTO: 3.57 10*6/MM3 (ref 4.6–6)
SODIUM BLD-SCNC: 139 MMOL/L (ref 136–145)
WBC NRBC COR # BLD: 6.82 10*3/MM3 (ref 4.5–10.7)

## 2018-05-26 PROCEDURE — 25010000002 HYDRALAZINE PER 20 MG: Performed by: HOSPITALIST

## 2018-05-26 PROCEDURE — 99223 1ST HOSP IP/OBS HIGH 75: CPT | Performed by: INTERNAL MEDICINE

## 2018-05-26 PROCEDURE — 85025 COMPLETE CBC W/AUTO DIFF WBC: CPT | Performed by: HOSPITALIST

## 2018-05-26 PROCEDURE — 94799 UNLISTED PULMONARY SVC/PX: CPT

## 2018-05-26 PROCEDURE — 80048 BASIC METABOLIC PNL TOTAL CA: CPT | Performed by: HOSPITALIST

## 2018-05-26 RX ORDER — FLUOXETINE HYDROCHLORIDE 20 MG/1
20 CAPSULE ORAL DAILY
Status: DISCONTINUED | OUTPATIENT
Start: 2018-05-26 | End: 2018-06-01 | Stop reason: HOSPADM

## 2018-05-26 RX ORDER — IPRATROPIUM BROMIDE AND ALBUTEROL SULFATE 2.5; .5 MG/3ML; MG/3ML
3 SOLUTION RESPIRATORY (INHALATION)
Status: DISCONTINUED | OUTPATIENT
Start: 2018-05-26 | End: 2018-05-27

## 2018-05-26 RX ORDER — POTASSIUM CHLORIDE 1.5 G/1.77G
20 POWDER, FOR SOLUTION ORAL 2 TIMES DAILY
Status: DISCONTINUED | OUTPATIENT
Start: 2018-05-26 | End: 2018-05-26

## 2018-05-26 RX ORDER — ALLOPURINOL 100 MG/1
100 TABLET ORAL DAILY
Status: DISCONTINUED | OUTPATIENT
Start: 2018-05-26 | End: 2018-06-01 | Stop reason: HOSPADM

## 2018-05-26 RX ORDER — THIAMINE MONONITRATE (VIT B1) 100 MG
100 TABLET ORAL DAILY
Status: DISCONTINUED | OUTPATIENT
Start: 2018-05-26 | End: 2018-06-01 | Stop reason: HOSPADM

## 2018-05-26 RX ORDER — FUROSEMIDE 10 MG/ML
80 INJECTION INTRAMUSCULAR; INTRAVENOUS ONCE
Status: DISCONTINUED | OUTPATIENT
Start: 2018-05-26 | End: 2018-05-26

## 2018-05-26 RX ORDER — POTASSIUM CHLORIDE 1.5 G/1.77G
20 POWDER, FOR SOLUTION ORAL
Status: DISCONTINUED | OUTPATIENT
Start: 2018-05-26 | End: 2018-05-26 | Stop reason: CLARIF

## 2018-05-26 RX ORDER — FERROUS SULFATE 325(65) MG
325 TABLET ORAL
Status: DISCONTINUED | OUTPATIENT
Start: 2018-05-26 | End: 2018-05-26

## 2018-05-26 RX ORDER — PANTOPRAZOLE SODIUM 40 MG/1
40 TABLET, DELAYED RELEASE ORAL DAILY
Status: DISCONTINUED | OUTPATIENT
Start: 2018-05-26 | End: 2018-06-01 | Stop reason: HOSPADM

## 2018-05-26 RX ORDER — BUMETANIDE 0.25 MG/ML
2 INJECTION INTRAMUSCULAR; INTRAVENOUS EVERY 8 HOURS SCHEDULED
Status: DISCONTINUED | OUTPATIENT
Start: 2018-05-26 | End: 2018-05-27

## 2018-05-26 RX ORDER — DIPHENOXYLATE HYDROCHLORIDE AND ATROPINE SULFATE 2.5; .025 MG/1; MG/1
1 TABLET ORAL DAILY
Status: DISCONTINUED | OUTPATIENT
Start: 2018-05-26 | End: 2018-06-01 | Stop reason: HOSPADM

## 2018-05-26 RX ORDER — POTASSIUM CHLORIDE 750 MG/1
20 CAPSULE, EXTENDED RELEASE ORAL
Status: DISCONTINUED | OUTPATIENT
Start: 2018-05-26 | End: 2018-05-27

## 2018-05-26 RX ORDER — ALLOPURINOL 300 MG/1
300 TABLET ORAL DAILY
Status: DISCONTINUED | OUTPATIENT
Start: 2018-05-26 | End: 2018-05-26

## 2018-05-26 RX ORDER — FOLIC ACID 1 MG/1
1 TABLET ORAL DAILY
Status: DISCONTINUED | OUTPATIENT
Start: 2018-05-26 | End: 2018-06-01 | Stop reason: HOSPADM

## 2018-05-26 RX ADMIN — Medication 100 MG: at 15:03

## 2018-05-26 RX ADMIN — METOPROLOL TARTRATE 25 MG: 25 TABLET ORAL at 15:09

## 2018-05-26 RX ADMIN — BUMETANIDE 2 MG: 0.25 INJECTION INTRAMUSCULAR; INTRAVENOUS at 21:53

## 2018-05-26 RX ADMIN — POTASSIUM CHLORIDE 20 MEQ: 750 CAPSULE, EXTENDED RELEASE ORAL at 18:24

## 2018-05-26 RX ADMIN — Medication 20 MG: at 08:34

## 2018-05-26 RX ADMIN — IPRATROPIUM BROMIDE AND ALBUTEROL SULFATE 3 ML: .5; 3 SOLUTION RESPIRATORY (INHALATION) at 23:51

## 2018-05-26 RX ADMIN — Medication 1 TABLET: at 15:04

## 2018-05-26 RX ADMIN — APIXABAN 5 MG: 5 TABLET, FILM COATED ORAL at 08:34

## 2018-05-26 RX ADMIN — BUMETANIDE 2 MG: 0.25 INJECTION INTRAMUSCULAR; INTRAVENOUS at 12:24

## 2018-05-26 RX ADMIN — PANTOPRAZOLE SODIUM 40 MG: 40 TABLET, DELAYED RELEASE ORAL at 06:07

## 2018-05-26 RX ADMIN — IPRATROPIUM BROMIDE AND ALBUTEROL SULFATE 3 ML: .5; 3 SOLUTION RESPIRATORY (INHALATION) at 20:54

## 2018-05-26 RX ADMIN — METOPROLOL TARTRATE 25 MG: 25 TABLET ORAL at 20:13

## 2018-05-26 RX ADMIN — APIXABAN 5 MG: 5 TABLET, FILM COATED ORAL at 20:13

## 2018-05-26 RX ADMIN — FLUOXETINE HYDROCHLORIDE 20 MG: 20 CAPSULE ORAL at 15:03

## 2018-05-26 RX ADMIN — PANTOPRAZOLE SODIUM 40 MG: 40 TABLET, DELAYED RELEASE ORAL at 15:08

## 2018-05-26 RX ADMIN — POTASSIUM CHLORIDE 20 MEQ: 750 CAPSULE, EXTENDED RELEASE ORAL at 15:08

## 2018-05-26 RX ADMIN — ALLOPURINOL 100 MG: 100 TABLET ORAL at 15:03

## 2018-05-26 RX ADMIN — FOLIC ACID 1 MG: 1 TABLET ORAL at 15:03

## 2018-05-27 LAB
ALBUMIN SERPL-MCNC: 3.1 G/DL (ref 3.5–5.2)
ALBUMIN/GLOB SERPL: 1.2 G/DL
ALP SERPL-CCNC: 79 U/L (ref 39–117)
ALT SERPL W P-5'-P-CCNC: 11 U/L (ref 1–41)
ANION GAP SERPL CALCULATED.3IONS-SCNC: 15.7 MMOL/L
AST SERPL-CCNC: 19 U/L (ref 1–40)
BASOPHILS # BLD AUTO: 0.01 10*3/MM3 (ref 0–0.2)
BASOPHILS NFR BLD AUTO: 0.2 % (ref 0–1.5)
BILIRUB SERPL-MCNC: 1.6 MG/DL (ref 0.1–1.2)
BUN BLD-MCNC: 23 MG/DL (ref 8–23)
BUN/CREAT SERPL: 20 (ref 7–25)
CALCIUM SPEC-SCNC: 8.1 MG/DL (ref 8.6–10.5)
CHLORIDE SERPL-SCNC: 97 MMOL/L (ref 98–107)
CHOLEST SERPL-MCNC: 122 MG/DL (ref 0–200)
CO2 SERPL-SCNC: 28.3 MMOL/L (ref 22–29)
CREAT BLD-MCNC: 1.15 MG/DL (ref 0.76–1.27)
DEPRECATED RDW RBC AUTO: 52.5 FL (ref 37–54)
EOSINOPHIL # BLD AUTO: 0.01 10*3/MM3 (ref 0–0.7)
EOSINOPHIL NFR BLD AUTO: 0.2 % (ref 0.3–6.2)
ERYTHROCYTE [DISTWIDTH] IN BLOOD BY AUTOMATED COUNT: 14.8 % (ref 11.5–14.5)
GFR SERPL CREATININE-BSD FRML MDRD: 63 ML/MIN/1.73
GLOBULIN UR ELPH-MCNC: 2.6 GM/DL
GLUCOSE BLD-MCNC: 149 MG/DL (ref 65–99)
HBA1C MFR BLD: 4.8 % (ref 4.8–5.6)
HCT VFR BLD AUTO: 31.6 % (ref 40.4–52.2)
HDLC SERPL-MCNC: 64 MG/DL (ref 40–60)
HGB BLD-MCNC: 10.4 G/DL (ref 13.7–17.6)
IMM GRANULOCYTES # BLD: 0 10*3/MM3 (ref 0–0.03)
IMM GRANULOCYTES NFR BLD: 0 % (ref 0–0.5)
LDLC SERPL CALC-MCNC: 44 MG/DL (ref 0–100)
LDLC/HDLC SERPL: 0.69 {RATIO}
LYMPHOCYTES # BLD AUTO: 0.73 10*3/MM3 (ref 0.9–4.8)
LYMPHOCYTES NFR BLD AUTO: 16.8 % (ref 19.6–45.3)
MCH RBC QN AUTO: 31.9 PG (ref 27–32.7)
MCHC RBC AUTO-ENTMCNC: 32.9 G/DL (ref 32.6–36.4)
MCV RBC AUTO: 96.9 FL (ref 79.8–96.2)
MONOCYTES # BLD AUTO: 0.53 10*3/MM3 (ref 0.2–1.2)
MONOCYTES NFR BLD AUTO: 12.2 % (ref 5–12)
NEUTROPHILS # BLD AUTO: 3.07 10*3/MM3 (ref 1.9–8.1)
NEUTROPHILS NFR BLD AUTO: 70.6 % (ref 42.7–76)
NT-PROBNP SERPL-MCNC: ABNORMAL PG/ML (ref 5–900)
PLATELET # BLD AUTO: 134 10*3/MM3 (ref 140–500)
PMV BLD AUTO: 11.4 FL (ref 6–12)
POTASSIUM BLD-SCNC: 3.2 MMOL/L (ref 3.5–5.2)
PROT SERPL-MCNC: 5.7 G/DL (ref 6–8.5)
RBC # BLD AUTO: 3.26 10*6/MM3 (ref 4.6–6)
SODIUM BLD-SCNC: 141 MMOL/L (ref 136–145)
TRIGL SERPL-MCNC: 68 MG/DL (ref 0–150)
TSH SERPL DL<=0.05 MIU/L-ACNC: 4.61 MIU/ML (ref 0.27–4.2)
VLDLC SERPL-MCNC: 13.6 MG/DL (ref 5–40)
WBC NRBC COR # BLD: 4.35 10*3/MM3 (ref 4.5–10.7)

## 2018-05-27 PROCEDURE — 94799 UNLISTED PULMONARY SVC/PX: CPT

## 2018-05-27 PROCEDURE — 83880 ASSAY OF NATRIURETIC PEPTIDE: CPT | Performed by: HOSPITALIST

## 2018-05-27 PROCEDURE — 80061 LIPID PANEL: CPT | Performed by: HOSPITALIST

## 2018-05-27 PROCEDURE — 83036 HEMOGLOBIN GLYCOSYLATED A1C: CPT | Performed by: HOSPITALIST

## 2018-05-27 PROCEDURE — 99232 SBSQ HOSP IP/OBS MODERATE 35: CPT | Performed by: NURSE PRACTITIONER

## 2018-05-27 PROCEDURE — 85025 COMPLETE CBC W/AUTO DIFF WBC: CPT | Performed by: HOSPITALIST

## 2018-05-27 PROCEDURE — 84443 ASSAY THYROID STIM HORMONE: CPT | Performed by: HOSPITALIST

## 2018-05-27 PROCEDURE — 80053 COMPREHEN METABOLIC PANEL: CPT | Performed by: HOSPITALIST

## 2018-05-27 RX ORDER — IPRATROPIUM BROMIDE AND ALBUTEROL SULFATE 2.5; .5 MG/3ML; MG/3ML
3 SOLUTION RESPIRATORY (INHALATION) EVERY 4 HOURS PRN
Status: DISCONTINUED | OUTPATIENT
Start: 2018-05-27 | End: 2018-06-01

## 2018-05-27 RX ORDER — POTASSIUM CHLORIDE 750 MG/1
30 CAPSULE, EXTENDED RELEASE ORAL
Status: DISCONTINUED | OUTPATIENT
Start: 2018-05-27 | End: 2018-05-29

## 2018-05-27 RX ORDER — BUMETANIDE 0.25 MG/ML
2 INJECTION INTRAMUSCULAR; INTRAVENOUS EVERY 6 HOURS
Status: DISCONTINUED | OUTPATIENT
Start: 2018-05-27 | End: 2018-05-28

## 2018-05-27 RX ORDER — CARVEDILOL 6.25 MG/1
6.25 TABLET ORAL EVERY 12 HOURS SCHEDULED
Status: DISCONTINUED | OUTPATIENT
Start: 2018-05-27 | End: 2018-06-01 | Stop reason: HOSPADM

## 2018-05-27 RX ADMIN — APIXABAN 5 MG: 5 TABLET, FILM COATED ORAL at 09:07

## 2018-05-27 RX ADMIN — Medication 100 MG: at 09:07

## 2018-05-27 RX ADMIN — POTASSIUM CHLORIDE 30 MEQ: 750 CAPSULE, EXTENDED RELEASE ORAL at 17:04

## 2018-05-27 RX ADMIN — Medication 1 TABLET: at 09:07

## 2018-05-27 RX ADMIN — APIXABAN 5 MG: 5 TABLET, FILM COATED ORAL at 20:58

## 2018-05-27 RX ADMIN — FLUOXETINE HYDROCHLORIDE 20 MG: 20 CAPSULE ORAL at 09:07

## 2018-05-27 RX ADMIN — PANTOPRAZOLE SODIUM 40 MG: 40 TABLET, DELAYED RELEASE ORAL at 09:06

## 2018-05-27 RX ADMIN — POTASSIUM CHLORIDE 20 MEQ: 750 CAPSULE, EXTENDED RELEASE ORAL at 11:31

## 2018-05-27 RX ADMIN — IPRATROPIUM BROMIDE AND ALBUTEROL SULFATE 3 ML: .5; 3 SOLUTION RESPIRATORY (INHALATION) at 03:42

## 2018-05-27 RX ADMIN — ALLOPURINOL 100 MG: 100 TABLET ORAL at 09:07

## 2018-05-27 RX ADMIN — FOLIC ACID 1 MG: 1 TABLET ORAL at 09:06

## 2018-05-27 RX ADMIN — BUMETANIDE 2 MG: 0.25 INJECTION INTRAMUSCULAR; INTRAVENOUS at 06:08

## 2018-05-27 RX ADMIN — IPRATROPIUM BROMIDE AND ALBUTEROL SULFATE 3 ML: .5; 3 SOLUTION RESPIRATORY (INHALATION) at 07:48

## 2018-05-27 RX ADMIN — BUMETANIDE 2 MG: 0.25 INJECTION INTRAMUSCULAR; INTRAVENOUS at 17:39

## 2018-05-27 RX ADMIN — POTASSIUM CHLORIDE 20 MEQ: 750 CAPSULE, EXTENDED RELEASE ORAL at 09:07

## 2018-05-27 RX ADMIN — METOPROLOL TARTRATE 25 MG: 25 TABLET ORAL at 09:07

## 2018-05-28 LAB
ANION GAP SERPL CALCULATED.3IONS-SCNC: 17.1 MMOL/L
BASOPHILS # BLD AUTO: 0.03 10*3/MM3 (ref 0–0.2)
BASOPHILS NFR BLD AUTO: 0.5 % (ref 0–1.5)
BUN BLD-MCNC: 29 MG/DL (ref 8–23)
BUN/CREAT SERPL: 20.7 (ref 7–25)
CALCIUM SPEC-SCNC: 8 MG/DL (ref 8.6–10.5)
CHLORIDE SERPL-SCNC: 94 MMOL/L (ref 98–107)
CO2 SERPL-SCNC: 28.9 MMOL/L (ref 22–29)
CREAT BLD-MCNC: 1.4 MG/DL (ref 0.76–1.27)
DEPRECATED RDW RBC AUTO: 56 FL (ref 37–54)
EOSINOPHIL # BLD AUTO: 0.09 10*3/MM3 (ref 0–0.7)
EOSINOPHIL NFR BLD AUTO: 1.5 % (ref 0.3–6.2)
ERYTHROCYTE [DISTWIDTH] IN BLOOD BY AUTOMATED COUNT: 15.3 % (ref 11.5–14.5)
GFR SERPL CREATININE-BSD FRML MDRD: 50 ML/MIN/1.73
GLUCOSE BLD-MCNC: 89 MG/DL (ref 65–99)
HCT VFR BLD AUTO: 33.8 % (ref 40.4–52.2)
HGB BLD-MCNC: 10.9 G/DL (ref 13.7–17.6)
IMM GRANULOCYTES # BLD: 0.01 10*3/MM3 (ref 0–0.03)
IMM GRANULOCYTES NFR BLD: 0.2 % (ref 0–0.5)
LYMPHOCYTES # BLD AUTO: 0.76 10*3/MM3 (ref 0.9–4.8)
LYMPHOCYTES NFR BLD AUTO: 12.3 % (ref 19.6–45.3)
MCH RBC QN AUTO: 31.8 PG (ref 27–32.7)
MCHC RBC AUTO-ENTMCNC: 32.2 G/DL (ref 32.6–36.4)
MCV RBC AUTO: 98.5 FL (ref 79.8–96.2)
MONOCYTES # BLD AUTO: 0.96 10*3/MM3 (ref 0.2–1.2)
MONOCYTES NFR BLD AUTO: 15.6 % (ref 5–12)
NEUTROPHILS # BLD AUTO: 4.33 10*3/MM3 (ref 1.9–8.1)
NEUTROPHILS NFR BLD AUTO: 70.1 % (ref 42.7–76)
NT-PROBNP SERPL-MCNC: ABNORMAL PG/ML (ref 0–900)
PLATELET # BLD AUTO: 166 10*3/MM3 (ref 140–500)
PMV BLD AUTO: 10.7 FL (ref 6–12)
POTASSIUM BLD-SCNC: 3.7 MMOL/L (ref 3.5–5.2)
RBC # BLD AUTO: 3.43 10*6/MM3 (ref 4.6–6)
SODIUM BLD-SCNC: 140 MMOL/L (ref 136–145)
WBC NRBC COR # BLD: 6.17 10*3/MM3 (ref 4.5–10.7)

## 2018-05-28 PROCEDURE — 80048 BASIC METABOLIC PNL TOTAL CA: CPT | Performed by: HOSPITALIST

## 2018-05-28 PROCEDURE — 99232 SBSQ HOSP IP/OBS MODERATE 35: CPT | Performed by: NURSE PRACTITIONER

## 2018-05-28 PROCEDURE — 83880 ASSAY OF NATRIURETIC PEPTIDE: CPT | Performed by: HOSPITALIST

## 2018-05-28 PROCEDURE — 85025 COMPLETE CBC W/AUTO DIFF WBC: CPT | Performed by: HOSPITALIST

## 2018-05-28 RX ORDER — BUMETANIDE 0.25 MG/ML
2 INJECTION INTRAMUSCULAR; INTRAVENOUS EVERY 12 HOURS
Status: DISCONTINUED | OUTPATIENT
Start: 2018-05-29 | End: 2018-06-01

## 2018-05-28 RX ORDER — BUMETANIDE 0.25 MG/ML
2 INJECTION INTRAMUSCULAR; INTRAVENOUS EVERY 12 HOURS
Status: DISCONTINUED | OUTPATIENT
Start: 2018-05-28 | End: 2018-05-28

## 2018-05-28 RX ADMIN — BUMETANIDE 2 MG: 0.25 INJECTION INTRAMUSCULAR; INTRAVENOUS at 05:48

## 2018-05-28 RX ADMIN — ALLOPURINOL 100 MG: 100 TABLET ORAL at 08:29

## 2018-05-28 RX ADMIN — CARVEDILOL 6.25 MG: 6.25 TABLET, FILM COATED ORAL at 20:56

## 2018-05-28 RX ADMIN — CARVEDILOL 6.25 MG: 6.25 TABLET, FILM COATED ORAL at 08:29

## 2018-05-28 RX ADMIN — POTASSIUM CHLORIDE 30 MEQ: 750 CAPSULE, EXTENDED RELEASE ORAL at 11:57

## 2018-05-28 RX ADMIN — POTASSIUM CHLORIDE 30 MEQ: 750 CAPSULE, EXTENDED RELEASE ORAL at 08:29

## 2018-05-28 RX ADMIN — FLUOXETINE HYDROCHLORIDE 20 MG: 20 CAPSULE ORAL at 08:29

## 2018-05-28 RX ADMIN — Medication 1 TABLET: at 08:29

## 2018-05-28 RX ADMIN — POTASSIUM CHLORIDE 30 MEQ: 750 CAPSULE, EXTENDED RELEASE ORAL at 18:16

## 2018-05-28 RX ADMIN — BUMETANIDE 2 MG: 0.25 INJECTION INTRAMUSCULAR; INTRAVENOUS at 11:57

## 2018-05-28 RX ADMIN — PANTOPRAZOLE SODIUM 40 MG: 40 TABLET, DELAYED RELEASE ORAL at 08:29

## 2018-05-28 RX ADMIN — Medication 100 MG: at 08:29

## 2018-05-28 RX ADMIN — FOLIC ACID 1 MG: 1 TABLET ORAL at 08:29

## 2018-05-29 ENCOUNTER — APPOINTMENT (OUTPATIENT)
Dept: ULTRASOUND IMAGING | Facility: HOSPITAL | Age: 68
End: 2018-05-29
Attending: HOSPITALIST

## 2018-05-29 LAB
ALBUMIN SERPL-MCNC: 3 G/DL (ref 3.5–5.2)
ALBUMIN/GLOB SERPL: 1.2 G/DL
ALP SERPL-CCNC: 76 U/L (ref 39–117)
ALT SERPL W P-5'-P-CCNC: 12 U/L (ref 1–41)
ANION GAP SERPL CALCULATED.3IONS-SCNC: 16.2 MMOL/L
AST SERPL-CCNC: 18 U/L (ref 1–40)
BASOPHILS # BLD AUTO: 0.03 10*3/MM3 (ref 0–0.2)
BASOPHILS NFR BLD AUTO: 0.7 % (ref 0–1.5)
BILIRUB SERPL-MCNC: 1 MG/DL (ref 0.1–1.2)
BUN BLD-MCNC: 30 MG/DL (ref 8–23)
BUN/CREAT SERPL: 24.4 (ref 7–25)
CALCIUM SPEC-SCNC: 7.8 MG/DL (ref 8.6–10.5)
CHLORIDE SERPL-SCNC: 96 MMOL/L (ref 98–107)
CO2 SERPL-SCNC: 26.8 MMOL/L (ref 22–29)
CREAT BLD-MCNC: 1.23 MG/DL (ref 0.76–1.27)
DEPRECATED RDW RBC AUTO: 54.4 FL (ref 37–54)
EOSINOPHIL # BLD AUTO: 0.1 10*3/MM3 (ref 0–0.7)
EOSINOPHIL NFR BLD AUTO: 2.3 % (ref 0.3–6.2)
ERYTHROCYTE [DISTWIDTH] IN BLOOD BY AUTOMATED COUNT: 15 % (ref 11.5–14.5)
GFR SERPL CREATININE-BSD FRML MDRD: 58 ML/MIN/1.73
GLOBULIN UR ELPH-MCNC: 2.6 GM/DL
GLUCOSE BLD-MCNC: 106 MG/DL (ref 65–99)
HCT VFR BLD AUTO: 32.5 % (ref 40.4–52.2)
HGB BLD-MCNC: 10.4 G/DL (ref 13.7–17.6)
IMM GRANULOCYTES # BLD: 0 10*3/MM3 (ref 0–0.03)
IMM GRANULOCYTES NFR BLD: 0 % (ref 0–0.5)
INR PPP: 1.67 (ref 0.9–1.1)
LYMPHOCYTES # BLD AUTO: 0.57 10*3/MM3 (ref 0.9–4.8)
LYMPHOCYTES NFR BLD AUTO: 13.1 % (ref 19.6–45.3)
MAGNESIUM SERPL-MCNC: 1 MG/DL (ref 1.6–2.4)
MCH RBC QN AUTO: 31.5 PG (ref 27–32.7)
MCHC RBC AUTO-ENTMCNC: 32 G/DL (ref 32.6–36.4)
MCV RBC AUTO: 98.5 FL (ref 79.8–96.2)
MONOCYTES # BLD AUTO: 0.81 10*3/MM3 (ref 0.2–1.2)
MONOCYTES NFR BLD AUTO: 18.7 % (ref 5–12)
NEUTROPHILS # BLD AUTO: 2.83 10*3/MM3 (ref 1.9–8.1)
NEUTROPHILS NFR BLD AUTO: 65.2 % (ref 42.7–76)
NT-PROBNP SERPL-MCNC: ABNORMAL PG/ML (ref 0–900)
PLATELET # BLD AUTO: 142 10*3/MM3 (ref 140–500)
PMV BLD AUTO: 11.3 FL (ref 6–12)
POTASSIUM BLD-SCNC: 3.9 MMOL/L (ref 3.5–5.2)
PROT SERPL-MCNC: 5.6 G/DL (ref 6–8.5)
PROTHROMBIN TIME: 19.4 SECONDS (ref 11.7–14.2)
RBC # BLD AUTO: 3.3 10*6/MM3 (ref 4.6–6)
SODIUM BLD-SCNC: 139 MMOL/L (ref 136–145)
WBC NRBC COR # BLD: 4.34 10*3/MM3 (ref 4.5–10.7)

## 2018-05-29 PROCEDURE — 83735 ASSAY OF MAGNESIUM: CPT | Performed by: NURSE PRACTITIONER

## 2018-05-29 PROCEDURE — 83880 ASSAY OF NATRIURETIC PEPTIDE: CPT | Performed by: HOSPITALIST

## 2018-05-29 PROCEDURE — 85610 PROTHROMBIN TIME: CPT | Performed by: NURSE PRACTITIONER

## 2018-05-29 PROCEDURE — 80053 COMPREHEN METABOLIC PANEL: CPT | Performed by: NURSE PRACTITIONER

## 2018-05-29 PROCEDURE — 0W9G3ZZ DRAINAGE OF PERITONEAL CAVITY, PERCUTANEOUS APPROACH: ICD-10-PCS | Performed by: HOSPITALIST

## 2018-05-29 PROCEDURE — 25010000002 MAGNESIUM SULFATE IN D5W 1G/100ML (PREMIX) 1-5 GM/100ML-% SOLUTION: Performed by: NURSE PRACTITIONER

## 2018-05-29 PROCEDURE — 76942 ECHO GUIDE FOR BIOPSY: CPT

## 2018-05-29 PROCEDURE — 99232 SBSQ HOSP IP/OBS MODERATE 35: CPT | Performed by: INTERNAL MEDICINE

## 2018-05-29 PROCEDURE — 85025 COMPLETE CBC W/AUTO DIFF WBC: CPT | Performed by: HOSPITALIST

## 2018-05-29 RX ORDER — LIDOCAINE HYDROCHLORIDE 10 MG/ML
20 INJECTION, SOLUTION INFILTRATION; PERINEURAL ONCE
Status: COMPLETED | OUTPATIENT
Start: 2018-05-29 | End: 2018-05-29

## 2018-05-29 RX ORDER — POTASSIUM CHLORIDE 750 MG/1
40 CAPSULE, EXTENDED RELEASE ORAL
Status: DISCONTINUED | OUTPATIENT
Start: 2018-05-29 | End: 2018-05-31

## 2018-05-29 RX ORDER — MAGNESIUM SULFATE 1 G/100ML
2 INJECTION INTRAVENOUS
Status: COMPLETED | OUTPATIENT
Start: 2018-05-29 | End: 2018-05-29

## 2018-05-29 RX ORDER — DOBUTAMINE HYDROCHLORIDE 100 MG/100ML
2.5 INJECTION INTRAVENOUS CONTINUOUS
Status: DISCONTINUED | OUTPATIENT
Start: 2018-05-29 | End: 2018-05-31

## 2018-05-29 RX ADMIN — ALLOPURINOL 100 MG: 100 TABLET ORAL at 08:43

## 2018-05-29 RX ADMIN — POTASSIUM CHLORIDE 30 MEQ: 750 CAPSULE, EXTENDED RELEASE ORAL at 08:43

## 2018-05-29 RX ADMIN — LIDOCAINE HYDROCHLORIDE 10 ML: 10 INJECTION, SOLUTION INFILTRATION; PERINEURAL at 10:25

## 2018-05-29 RX ADMIN — MAGNESIUM SULFATE HEPTAHYDRATE 2 G: 1 INJECTION, SOLUTION INTRAVENOUS at 15:46

## 2018-05-29 RX ADMIN — FLUOXETINE HYDROCHLORIDE 20 MG: 20 CAPSULE ORAL at 08:43

## 2018-05-29 RX ADMIN — BUMETANIDE 2 MG: 0.25 INJECTION INTRAMUSCULAR; INTRAVENOUS at 06:14

## 2018-05-29 RX ADMIN — PANTOPRAZOLE SODIUM 40 MG: 40 TABLET, DELAYED RELEASE ORAL at 08:43

## 2018-05-29 RX ADMIN — POTASSIUM CHLORIDE 40 MEQ: 750 CAPSULE, EXTENDED RELEASE ORAL at 16:46

## 2018-05-29 RX ADMIN — FOLIC ACID 1 MG: 1 TABLET ORAL at 08:43

## 2018-05-29 RX ADMIN — CARVEDILOL 6.25 MG: 6.25 TABLET, FILM COATED ORAL at 08:43

## 2018-05-29 RX ADMIN — Medication 100 MG: at 08:43

## 2018-05-29 RX ADMIN — MAGNESIUM SULFATE HEPTAHYDRATE 2 G: 1 INJECTION, SOLUTION INTRAVENOUS at 12:51

## 2018-05-29 RX ADMIN — Medication 1 TABLET: at 08:43

## 2018-05-30 LAB
ANION GAP SERPL CALCULATED.3IONS-SCNC: 9.2 MMOL/L
BASOPHILS # BLD AUTO: 0.01 10*3/MM3 (ref 0–0.2)
BASOPHILS NFR BLD AUTO: 0.3 % (ref 0–1.5)
BUN BLD-MCNC: 31 MG/DL (ref 8–23)
BUN/CREAT SERPL: 24 (ref 7–25)
CALCIUM SPEC-SCNC: 7.4 MG/DL (ref 8.6–10.5)
CHLORIDE SERPL-SCNC: 98 MMOL/L (ref 98–107)
CO2 SERPL-SCNC: 31.8 MMOL/L (ref 22–29)
CREAT BLD-MCNC: 1.29 MG/DL (ref 0.76–1.27)
DEPRECATED RDW RBC AUTO: 52.8 FL (ref 37–54)
EOSINOPHIL # BLD AUTO: 0.06 10*3/MM3 (ref 0–0.7)
EOSINOPHIL NFR BLD AUTO: 1.5 % (ref 0.3–6.2)
ERYTHROCYTE [DISTWIDTH] IN BLOOD BY AUTOMATED COUNT: 14.6 % (ref 11.5–14.5)
GFR SERPL CREATININE-BSD FRML MDRD: 55 ML/MIN/1.73
GLUCOSE BLD-MCNC: 102 MG/DL (ref 65–99)
HCT VFR BLD AUTO: 32.5 % (ref 40.4–52.2)
HGB BLD-MCNC: 10.5 G/DL (ref 13.7–17.6)
IMM GRANULOCYTES # BLD: 0 10*3/MM3 (ref 0–0.03)
IMM GRANULOCYTES NFR BLD: 0 % (ref 0–0.5)
LYMPHOCYTES # BLD AUTO: 0.43 10*3/MM3 (ref 0.9–4.8)
LYMPHOCYTES NFR BLD AUTO: 10.9 % (ref 19.6–45.3)
MAGNESIUM SERPL-MCNC: 1.7 MG/DL (ref 1.6–2.4)
MCH RBC QN AUTO: 31.9 PG (ref 27–32.7)
MCHC RBC AUTO-ENTMCNC: 32.3 G/DL (ref 32.6–36.4)
MCV RBC AUTO: 98.8 FL (ref 79.8–96.2)
MONOCYTES # BLD AUTO: 0.77 10*3/MM3 (ref 0.2–1.2)
MONOCYTES NFR BLD AUTO: 19.5 % (ref 5–12)
NEUTROPHILS # BLD AUTO: 2.67 10*3/MM3 (ref 1.9–8.1)
NEUTROPHILS NFR BLD AUTO: 67.8 % (ref 42.7–76)
NT-PROBNP SERPL-MCNC: ABNORMAL PG/ML (ref 5–900)
PLATELET # BLD AUTO: 125 10*3/MM3 (ref 140–500)
PMV BLD AUTO: 11.4 FL (ref 6–12)
POTASSIUM BLD-SCNC: 4.1 MMOL/L (ref 3.5–5.2)
RBC # BLD AUTO: 3.29 10*6/MM3 (ref 4.6–6)
SODIUM BLD-SCNC: 139 MMOL/L (ref 136–145)
WBC NRBC COR # BLD: 3.94 10*3/MM3 (ref 4.5–10.7)

## 2018-05-30 PROCEDURE — 25010000002 DOBUTAMINE PER 250 MG: Performed by: INTERNAL MEDICINE

## 2018-05-30 PROCEDURE — 83880 ASSAY OF NATRIURETIC PEPTIDE: CPT | Performed by: NURSE PRACTITIONER

## 2018-05-30 PROCEDURE — 99232 SBSQ HOSP IP/OBS MODERATE 35: CPT | Performed by: INTERNAL MEDICINE

## 2018-05-30 PROCEDURE — 80048 BASIC METABOLIC PNL TOTAL CA: CPT | Performed by: NURSE PRACTITIONER

## 2018-05-30 PROCEDURE — 85025 COMPLETE CBC W/AUTO DIFF WBC: CPT | Performed by: HOSPITALIST

## 2018-05-30 PROCEDURE — 83735 ASSAY OF MAGNESIUM: CPT | Performed by: NURSE PRACTITIONER

## 2018-05-30 RX ADMIN — POTASSIUM CHLORIDE 40 MEQ: 750 CAPSULE, EXTENDED RELEASE ORAL at 08:19

## 2018-05-30 RX ADMIN — Medication 100 MG: at 08:19

## 2018-05-30 RX ADMIN — POTASSIUM CHLORIDE 40 MEQ: 750 CAPSULE, EXTENDED RELEASE ORAL at 18:09

## 2018-05-30 RX ADMIN — PANTOPRAZOLE SODIUM 40 MG: 40 TABLET, DELAYED RELEASE ORAL at 08:19

## 2018-05-30 RX ADMIN — FLUOXETINE HYDROCHLORIDE 20 MG: 20 CAPSULE ORAL at 08:19

## 2018-05-30 RX ADMIN — CARVEDILOL 6.25 MG: 6.25 TABLET, FILM COATED ORAL at 08:19

## 2018-05-30 RX ADMIN — BUMETANIDE 2 MG: 0.25 INJECTION INTRAMUSCULAR; INTRAVENOUS at 06:36

## 2018-05-30 RX ADMIN — DOBUTAMINE IN DEXTROSE 2.5 MCG/KG/MIN: 100 INJECTION, SOLUTION INTRAVENOUS at 18:09

## 2018-05-30 RX ADMIN — CARVEDILOL 6.25 MG: 6.25 TABLET, FILM COATED ORAL at 21:42

## 2018-05-30 RX ADMIN — ALLOPURINOL 100 MG: 100 TABLET ORAL at 08:19

## 2018-05-30 RX ADMIN — FOLIC ACID 1 MG: 1 TABLET ORAL at 08:19

## 2018-05-30 RX ADMIN — APIXABAN 5 MG: 5 TABLET, FILM COATED ORAL at 08:19

## 2018-05-30 RX ADMIN — Medication 1 TABLET: at 08:19

## 2018-05-30 RX ADMIN — APIXABAN 5 MG: 5 TABLET, FILM COATED ORAL at 21:42

## 2018-05-30 RX ADMIN — BUMETANIDE 2 MG: 0.25 INJECTION INTRAMUSCULAR; INTRAVENOUS at 18:18

## 2018-05-31 LAB
ANION GAP SERPL CALCULATED.3IONS-SCNC: 9 MMOL/L
BUN BLD-MCNC: 28 MG/DL (ref 8–23)
BUN/CREAT SERPL: 21.5 (ref 7–25)
CALCIUM SPEC-SCNC: 7.5 MG/DL (ref 8.6–10.5)
CHLORIDE SERPL-SCNC: 98 MMOL/L (ref 98–107)
CO2 SERPL-SCNC: 34 MMOL/L (ref 22–29)
CREAT BLD-MCNC: 1.3 MG/DL (ref 0.76–1.27)
GFR SERPL CREATININE-BSD FRML MDRD: 55 ML/MIN/1.73
GLUCOSE BLD-MCNC: 100 MG/DL (ref 65–99)
MAGNESIUM SERPL-MCNC: 1.5 MG/DL (ref 1.6–2.4)
NT-PROBNP SERPL-MCNC: 8733 PG/ML (ref 5–900)
POTASSIUM BLD-SCNC: 4.4 MMOL/L (ref 3.5–5.2)
POTASSIUM BLD-SCNC: 4.7 MMOL/L (ref 3.5–5.2)
SODIUM BLD-SCNC: 141 MMOL/L (ref 136–145)

## 2018-05-31 PROCEDURE — 25010000002 DOBUTAMINE PER 250 MG: Performed by: INTERNAL MEDICINE

## 2018-05-31 PROCEDURE — 25010000002 MAGNESIUM SULFATE IN D5W 1G/100ML (PREMIX) 1-5 GM/100ML-% SOLUTION: Performed by: INTERNAL MEDICINE

## 2018-05-31 PROCEDURE — 84132 ASSAY OF SERUM POTASSIUM: CPT | Performed by: INTERNAL MEDICINE

## 2018-05-31 PROCEDURE — 83880 ASSAY OF NATRIURETIC PEPTIDE: CPT | Performed by: HOSPITALIST

## 2018-05-31 PROCEDURE — 99232 SBSQ HOSP IP/OBS MODERATE 35: CPT | Performed by: INTERNAL MEDICINE

## 2018-05-31 PROCEDURE — 80048 BASIC METABOLIC PNL TOTAL CA: CPT | Performed by: HOSPITALIST

## 2018-05-31 PROCEDURE — 83735 ASSAY OF MAGNESIUM: CPT | Performed by: INTERNAL MEDICINE

## 2018-05-31 RX ORDER — POTASSIUM CHLORIDE 750 MG/1
40 CAPSULE, EXTENDED RELEASE ORAL 2 TIMES DAILY WITH MEALS
Status: DISCONTINUED | OUTPATIENT
Start: 2018-06-01 | End: 2018-06-01 | Stop reason: HOSPADM

## 2018-05-31 RX ORDER — BUMETANIDE 1 MG/1
1 TABLET ORAL 2 TIMES DAILY
Status: DISCONTINUED | OUTPATIENT
Start: 2018-06-01 | End: 2018-06-01 | Stop reason: HOSPADM

## 2018-05-31 RX ORDER — MAGNESIUM SULFATE 1 G/100ML
1 INJECTION INTRAVENOUS
Status: COMPLETED | OUTPATIENT
Start: 2018-05-31 | End: 2018-05-31

## 2018-05-31 RX ADMIN — ALLOPURINOL 100 MG: 100 TABLET ORAL at 09:06

## 2018-05-31 RX ADMIN — CARVEDILOL 6.25 MG: 6.25 TABLET, FILM COATED ORAL at 21:53

## 2018-05-31 RX ADMIN — APIXABAN 5 MG: 5 TABLET, FILM COATED ORAL at 09:06

## 2018-05-31 RX ADMIN — MAGNESIUM SULFATE HEPTAHYDRATE 1 G: 1 INJECTION, SOLUTION INTRAVENOUS at 22:30

## 2018-05-31 RX ADMIN — FOLIC ACID 1 MG: 1 TABLET ORAL at 09:06

## 2018-05-31 RX ADMIN — FLUOXETINE HYDROCHLORIDE 20 MG: 20 CAPSULE ORAL at 09:06

## 2018-05-31 RX ADMIN — POTASSIUM CHLORIDE 40 MEQ: 750 CAPSULE, EXTENDED RELEASE ORAL at 09:06

## 2018-05-31 RX ADMIN — DOBUTAMINE IN DEXTROSE 2.5 MCG/KG/MIN: 100 INJECTION, SOLUTION INTRAVENOUS at 12:26

## 2018-05-31 RX ADMIN — BUMETANIDE 2 MG: 0.25 INJECTION INTRAMUSCULAR; INTRAVENOUS at 06:00

## 2018-05-31 RX ADMIN — Medication 1 TABLET: at 09:06

## 2018-05-31 RX ADMIN — PANTOPRAZOLE SODIUM 40 MG: 40 TABLET, DELAYED RELEASE ORAL at 09:06

## 2018-05-31 RX ADMIN — CARVEDILOL 6.25 MG: 6.25 TABLET, FILM COATED ORAL at 09:06

## 2018-05-31 RX ADMIN — MAGNESIUM SULFATE HEPTAHYDRATE 1 G: 1 INJECTION, SOLUTION INTRAVENOUS at 23:28

## 2018-05-31 RX ADMIN — BUMETANIDE 2 MG: 0.25 INJECTION INTRAMUSCULAR; INTRAVENOUS at 17:01

## 2018-05-31 RX ADMIN — APIXABAN 5 MG: 5 TABLET, FILM COATED ORAL at 21:53

## 2018-05-31 RX ADMIN — Medication 100 MG: at 09:06

## 2018-06-01 VITALS
HEART RATE: 54 BPM | RESPIRATION RATE: 18 BRPM | HEIGHT: 71 IN | DIASTOLIC BLOOD PRESSURE: 72 MMHG | BODY MASS INDEX: 25.48 KG/M2 | WEIGHT: 182 LBS | TEMPERATURE: 98.3 F | OXYGEN SATURATION: 91 % | SYSTOLIC BLOOD PRESSURE: 104 MMHG

## 2018-06-01 LAB
ANION GAP SERPL CALCULATED.3IONS-SCNC: 11.4 MMOL/L
BUN BLD-MCNC: 32 MG/DL (ref 8–23)
BUN/CREAT SERPL: 24.2 (ref 7–25)
CALCIUM SPEC-SCNC: 7.5 MG/DL (ref 8.6–10.5)
CHLORIDE SERPL-SCNC: 95 MMOL/L (ref 98–107)
CO2 SERPL-SCNC: 32.6 MMOL/L (ref 22–29)
CREAT BLD-MCNC: 1.32 MG/DL (ref 0.76–1.27)
GFR SERPL CREATININE-BSD FRML MDRD: 54 ML/MIN/1.73
GLUCOSE BLD-MCNC: 104 MG/DL (ref 65–99)
MAGNESIUM SERPL-MCNC: 1.9 MG/DL (ref 1.6–2.4)
NT-PROBNP SERPL-MCNC: 7750 PG/ML (ref 5–900)
POTASSIUM BLD-SCNC: 3.9 MMOL/L (ref 3.5–5.2)
SODIUM BLD-SCNC: 139 MMOL/L (ref 136–145)

## 2018-06-01 PROCEDURE — 83880 ASSAY OF NATRIURETIC PEPTIDE: CPT | Performed by: HOSPITALIST

## 2018-06-01 PROCEDURE — 99231 SBSQ HOSP IP/OBS SF/LOW 25: CPT | Performed by: NURSE PRACTITIONER

## 2018-06-01 PROCEDURE — 83735 ASSAY OF MAGNESIUM: CPT | Performed by: NURSE PRACTITIONER

## 2018-06-01 PROCEDURE — 80048 BASIC METABOLIC PNL TOTAL CA: CPT | Performed by: HOSPITALIST

## 2018-06-01 RX ORDER — POTASSIUM CHLORIDE 750 MG/1
40 CAPSULE, EXTENDED RELEASE ORAL 2 TIMES DAILY WITH MEALS
Qty: 240 CAPSULE | Refills: 0 | Status: SHIPPED | OUTPATIENT
Start: 2018-06-01 | End: 2018-07-01

## 2018-06-01 RX ORDER — MAGNESIUM OXIDE 400 MG/1
400 TABLET ORAL DAILY
Qty: 30 TABLET | Refills: 0 | Status: SHIPPED | OUTPATIENT
Start: 2018-06-01 | End: 2018-07-01

## 2018-06-01 RX ORDER — FOLIC ACID 1 MG/1
1 TABLET ORAL DAILY
Qty: 30 TABLET | Refills: 0 | Status: SHIPPED | OUTPATIENT
Start: 2018-06-02 | End: 2018-07-02

## 2018-06-01 RX ORDER — CARVEDILOL 6.25 MG/1
6.25 TABLET ORAL EVERY 12 HOURS SCHEDULED
Qty: 60 TABLET | Refills: 0 | Status: SHIPPED | OUTPATIENT
Start: 2018-06-01 | End: 2018-07-01

## 2018-06-01 RX ORDER — ALLOPURINOL 100 MG/1
100 TABLET ORAL DAILY
Qty: 30 TABLET | Refills: 0 | Status: SHIPPED | OUTPATIENT
Start: 2018-06-02 | End: 2018-07-02

## 2018-06-01 RX ORDER — MAGNESIUM OXIDE 400 MG/1
400 TABLET ORAL DAILY
Status: DISCONTINUED | OUTPATIENT
Start: 2018-06-01 | End: 2018-06-01 | Stop reason: HOSPADM

## 2018-06-01 RX ADMIN — APIXABAN 5 MG: 5 TABLET, FILM COATED ORAL at 08:42

## 2018-06-01 RX ADMIN — CARVEDILOL 6.25 MG: 6.25 TABLET, FILM COATED ORAL at 08:42

## 2018-06-01 RX ADMIN — POTASSIUM CHLORIDE 40 MEQ: 750 CAPSULE, EXTENDED RELEASE ORAL at 08:42

## 2018-06-01 RX ADMIN — Medication 400 MG: at 14:49

## 2018-06-01 RX ADMIN — Medication 1 TABLET: at 08:42

## 2018-06-01 RX ADMIN — ALLOPURINOL 100 MG: 100 TABLET ORAL at 08:42

## 2018-06-01 RX ADMIN — PANTOPRAZOLE SODIUM 40 MG: 40 TABLET, DELAYED RELEASE ORAL at 08:43

## 2018-06-01 RX ADMIN — FLUOXETINE HYDROCHLORIDE 20 MG: 20 CAPSULE ORAL at 08:42

## 2018-06-01 RX ADMIN — FOLIC ACID 1 MG: 1 TABLET ORAL at 08:42

## 2018-06-01 RX ADMIN — Medication 100 MG: at 08:42

## 2018-06-01 RX ADMIN — BUMETANIDE 1 MG: 1 TABLET ORAL at 08:42

## 2018-06-01 NOTE — PROGRESS NOTES
"Continued Stay Note   [facility]     Patient Name: Anibal Freedman  MRN: [id number]  Today's Date: 6/1/2018    Admit Date: 5/25/2018          Discharge Plan     Row Name 06/01/18 1300       Plan    Plan Home     Patient/Family in Agreement with Plan yes    Plan Comments Noted plans for discharge. Followed up with patient who plans home today and does not feel he needs HH at this time. \"Give me their number and I'll call them if I need them.\" HH provider list given. Called to Felisa/[facility]  to inform and left vmm..              Discharge Codes    No documentation.       Expected Discharge Date and Time     Expected Discharge Date Expected Discharge Time    Jun 1, 2018             Que Lyn RN    "

## 2018-06-01 NOTE — DISCHARGE SUMMARY
Discharge summary    Date of admission 5/25/2018  Date of discharge 6/1/2018    Final diagnosis  Acute on chronic systolic congestive heart failure resolved  Fluid overload resolved  Cirrhosis with ascites status post paracentesis  Anasarca  Chronic kidney disease stage III  Coronary artery disease  Chronic atrial fibrillation  Alcohol abuse    Discharge medications    Current Facility-Administered Medications:   •  allopurinol (ZYLOPRIM) tablet 100 mg, 100 mg, Oral, Daily, Gal Randall MD, 100 mg at 06/01/18 0842  •  apixaban (ELIQUIS) tablet 5 mg, 5 mg, Oral, Q12H, Zakiya Valverde APRN, 5 mg at 06/01/18 0842  •  bumetanide (BUMEX) tablet 1 mg, 1 mg, Oral, BID, Zakiya Valverde APRN, 1 mg at 06/01/18 0842  •  carvedilol (COREG) tablet 6.25 mg, 6.25 mg, Oral, Q12H, Joy Thompson, APRN, 6.25 mg at 06/01/18 0842  •  FLUoxetine (PROzac) capsule 20 mg, 20 mg, Oral, Daily, Gal Randall MD, 20 mg at 06/01/18 0842  •  folic acid (FOLVITE) tablet 1 mg, 1 mg, Oral, Daily, Gal Randall MD, 1 mg at 06/01/18 0842  •  magnesium oxide (MAG-OX) tablet 400 mg, 400 mg, Oral, Daily, Zakiya Valverde, APRN  •  multivitamin (THERAGRAN) tablet 1 tablet, 1 tablet, Oral, Daily, Gla Randall MD, 1 tablet at 06/01/18 0842  •  pantoprazole (PROTONIX) EC tablet 40 mg, 40 mg, Oral, Daily, Gal Randall MD, 40 mg at 06/01/18 0843  •  potassium chloride (MICRO-K) CR capsule 40 mEq, 40 mEq, Oral, BID With Meals, Gal Randall MD, 40 mEq at 06/01/18 0842  •  Insert peripheral IV, , , Once **AND** sodium chloride 0.9 % flush 10 mL, 10 mL, Intravenous, PRN, Sigifredo Waite MD  •  thiamine (VITAMIN B-1) tablet 100 mg, 100 mg, Oral, Daily, Gal Randall MD, 100 mg at 06/01/18 0842     Consults obtained  Cardiology    Procedure  Therapeutic paracentesis    Hospital course  6 units white male was normal dorsovenous with multiple admissions in the past with history of severe systolic congestive heart failure also have cirrhosis continues to drink admitted  through emergency room with shortness of breath weight gain and bilateral lower extremity swelling patient workup revealed acute on chronic systolic congestive heart failure with fluid overload anasarca admitted for management.  Patient admitted to drink with dobutamine IV diuretics and underwent a high-volume paracentesis.  Patient at the cognition was held prior to paracentesis and restarted.  Patient improved and his dobutamine stopped and IV Bumex changed to by mouth.  Patient has low potassium and magnesium which is also replaced and will continue on the same dose at home.  Patient is 100% better discharge medications with close follow-up with primary doctor.  Patient will remain on detox medication during hospitalization for this continue alcohol abuse    Discharge diet regular    Activities tolerated    Medication as above    Follow-up with primary doctor in 1 week and follow up with cardiology per the instruction and take medication as directed.    VICTOR MANUEL VALDIVIA MD

## 2018-06-01 NOTE — PROGRESS NOTES
[facility]  Cardiology Progress Note    Patient Identification:  Name: Anibla Freedman  Age: 68 y.o.  Sex: male  :  1950  MRN: [id number]                 Follow Up / Chief Complaint: a/c sys HF, perm afib    Interval History:  68-year-old alcoholic with permanent A. fib, CAD and biventricular heart failure admitted with acute on chronic heart failure, ascites/anasara/ cor pulmonale, electrolyte imbalance.         Subjective:  Denies chest pain, tightness, palpitations or dizziness. Denies PND or orthopnea.     Reviewed importance of compliance with meds, fluid restriction and daily weights after discharge. Advised pt to call for weight gain >3lbs / 4 days , swelling and to return to ER per EMS for any recurrent symptoms including, but not limited to: chest pain/pressure/tightness, weakness, shortness of breath, dizziness, palpitations, near syncope or syncope. Otherwise, we will see him in office as scheduled on . All questions answered. He voiced understanding and agreement      Objective:  afib cvr 60's - Had a 13 beat run NSVT last pm (Mg 1.5)  104-119 / 70's-80's  Afeb  Sat 100% on room air    Mg 1.5-> 1.9    Inaccurate I&O  BNP 90449 -> 8733 -> 7750  BUN 32 (28), Cr 1.32 (1.30)    Past Medical History:  Past Medical History:   Diagnosis Date   • Alcoholism    • Arthritis    • Atrial fibrillation     pt reported   • Cellulitis    • CHF (congestive heart failure)    • Colon polyps 2006    Colonoscopy w/ snare cautery, polypectomy & biopsy, PATH:  Sigmoid Colon Biopsy: focal vascular congestion & evidence of recent hemorrhage, non-specific.  Recto-Sigmoid Colon Biopsy: Fragments of tubular adenoam w/ mild dysplasia. Dr. Mildred You   • DM2 (diabetes mellitus, type 2) 3/7/2018   • History of coronary angioplasty    • History of MRSA infection     pt reported   • History of staph infection 2005   • Hypertension    • Infectious viral hepatitis     pt reported   •  Prostate cancer 04/28/2010    S/P Radical Prostectomy, Adenocarcinoma, Primary Yissel Grade 3, Secondar [place]aff[place] Grade 3. Combined yissel score 6. Tumor involves rt & lt lobes, negative capsular margin, no invasion of seminal vesicles, no identified perineural invastion   • Withdrawal symptoms, alcohol      Past Surgical History:  Past Surgical History:   Procedure Laterality Date   • APPENDECTOMY     • CARDIAC ABLATION Right 01/18/2013    Atrial Flutter Ablation, Dr. Otis Srinivasan   • CARDIAC CATHETERIZATION N/A 8/2/2016    Procedure: Left Heart Cath   ?right cath too;  Surgeon: Epifanio May MD;  Location[facility] CATH [facility]ASIVE LOC[facility];  Service:    • CARDIAC CATHETERIZATION N/A 8/2/2016    Procedure: Coronary angiography;  Surgeon: Epifanio May MD;  Location[facility] C[facility];  Service:    • CARDIAC CATHETERIZATION N/A 8/2/2016    Procedure: Left ventriculography;  Surgeon: Epifanio May MD;  Location[facility] C[facility];  Service:    • CARDIAC CATHETERIZATION N/A 8/2/2016    Procedure: Right Heart Cath;  Surgeon: Epifanio May MD;  Location[facility] C[facility];  Service:    • COLONOSCOPY N/A 3/15/2017    Procedure: COLONOSCOPY TO CECUM WITH COLD BIOPSY POLYECTOMY;  Surgeon: Anibal Bower MD;  Location: [facility] ENDOSCOPY;  Service:    • COLONOSCOPY W/ BIOPSIES AND POLYPECTOMY N/A 08/21/2006    Colonoscopy w/ snare cautery, polypectomy & biopsy, PATH:  Sigmoid Colon Biopsy: focal vascular congestion & evidence of recent hemorrhage, non-specific.  Recto-Sigmoid Colon Biopsy: Fragments of tubular adenoam w/ mild dysplasia. Dr. Mildred You   • CORONARY ANGIOPLASTY WITH STENT PLACEMENT      x 2    • CYSTOSCOPY N/A 04/28/2010    Cystoscopy w/ transurethral incision of the bladder neck, Dr. ANDREW Cordova   • CYSTOTOMY N/A 09/23/2011    Laparoscopic closure of incidental cystotomy, Laparoscopic incision of pelvic lymphocele, Dr. Sandoval  Lele   • JOINT REPLACEMENT     • LEG DEBRIDEMENT Left 08/19/2005    Left Thigh, Debridement skin & subcutaneous tissue muscle w/ closure via advancement flaps, Dr. Mildred You   • LEG DEBRIDEMENT Left 08/08/2005    Debridement of left anterior thigh, Dr. Mildred You   • PROSTATECTOMY N/A 04/28/2010    Adenocarcinoma, Primary [place] Grade 3, Secondar Yissel Grade 3. Combined yissel score 6. Tumor involves rt & lt lobes, negative capsular margin, no invasion of seminal vesicles, no identified perineural invastion, Dr. Jaime Royal   • SKIN BIOPSY     • TONSILLECTOMY     • TOTAL KNEE ARTHROPLASTY Left 03/25/2008    Left total knee arthroplasty w/ [employer] pinless computer navigation, Dr. Ashok Crocker   • WOUND DEBRIDEMENT Left 08/02/2005    Left Buttocks & Left thigh wounds, Debridement of left buttocks & left thigh wounds, Dr. Mildred You        Social History:   Social History   Substance Use Topics   • Smoking status: Former Smoker     Packs/day: 1.50     Quit date: 3/15/2007   • Smokeless tobacco: Not on file   • Alcohol use 3.6 oz/week     6 Cans of beer per week      Comment: 1 pint a day OR MORE OF VODKA      Family History:  Family History   Problem Relation Age of Onset   • Heart disease Mother    • Alcohol abuse Father    • Liver cancer Father    • Cancer Sister    • Hypertension Brother    • Alcohol abuse Brother    • Skin cancer Brother           Allergies:  No Known Allergies  Scheduled Meds:    allopurinol 100 mg Daily   apixaban 5 mg Q12H   bumetanide 1 mg BID   carvedilol 6.25 mg Q12H   FLUoxetine 20 mg Daily   folic acid 1 mg Daily   multivitamin 1 tablet Daily   pantoprazole 40 mg Daily   potassium chloride 40 mEq BID With Meals   thiamine 100 mg Daily           INTAKE AND OUTPUT:    Intake/Output Summary (Last 24 hours) at 06/01/18 1037  Last data filed at 06/01/18 0900   Gross per 24 hour   Intake             1095 ml   Output                0 ml   Net             1095 ml       Review of  Systems:   GI:  No nausea or vomiting  Cardiac:  No chest pain or dizziness  Pulmonary:  No cough    Constitutional:  Temp:  [97.8 °F (36.6 °C)-98.3 °F (36.8 °C)] 98.3 °F (36.8 °C)  Heart Rate:  [54-68] 54  Resp:  [18] 18  BP: ()/(59-73) 104/72    Physical Exam:  General:  Appears chronically ill, but in no acute distress  Eyes: PERTL,  HEENT:  Thyroid not visibly enlarged. Oral mucosa moist, no cyanosis  Respiratory: Respirations regular at rest  BBS with diminished air entry in L>R lower fields. No crackles or wheezes auscultated  Cardiovascular: S1S2 irregular rate and rhythm. No murmur .  1+ pretibial edema  Gastrointestinal: Abdomen non tender,  Bowel sounds present.   Musculoskeletal: PEDRO weakly x4. No abnormal movements  Extremities: No digital clubbing or cyanosis  Skin:  Skin warm and dry to touch.  Neuro: AAO x3 CN II-XII grossly intact  Psych: Mood and affect normal, pleasant and cooperative          Cardiographics  Telemetry:       6-1-2018 @0112:        Echocardiogram 3-2018:   · Left ventricular wall thickness is consistent with mild concentric hypertrophy.  · The following left ventricular wall segments are hypokinetic: mid anterior, apical septal, basal inferoseptal, mid inferoseptal, apex hypokinetic, mid anteroseptal, basal anterior and basal inferoseptal.  · Right ventricular cavity is moderately dilated.  · Left atrial cavity size is moderately dilated.  · Mild pulmonic valve regurgitation is present.  · Mild to moderate tricuspid valve regurgitation is present.  · Mild mitral valve regurgitation is present  · Mild aortic valve regurgitation is present.  · Left Ventricle: Calculated EF = 22.5%.    R&L heart cath 8-2016:  HEMODYNAMIC / ANGIOGRAPHIC DATA:    .   1. Pulmonary artery systolic pressure was 55/25.  2. Right atrial pressure was 10..  3. Left ventricular end diastolic pressure was 15 mmHg.  4. The left main is normal.  5. The LAD is proximal stent had 40-50%  stenosis  6. Nondominant circumflex free of any atherosclerotic narrowing.  7. The right coronary artery is dominant with a diffuse proximal 50% mid 50-60% stenosis.  8. Mild pulmonary hypertension    Stress test 3-2018:  · Myocardial perfusion imaging indicates a normal myocardial perfusion study with no evidence of ischemia.  · Left ventricular ejection fraction is moderately reduced (Calculated EF = 36%).  · Impressions are consistent with a low risk study.    Lab Review     Results from last 7 days  Lab Units 06/01/18  0443   SODIUM mmol/L 139   POTASSIUM mmol/L 3.9   BUN mg/dL 32*   CREATININE mg/dL 1.32*   CALCIUM mg/dL 7.5*       Results from last 7 days  Lab Units 05/30/18  0444 05/29/18  0350 05/28/18  0905   WBC 10*3/mm3 3.94* 4.34* 6.17   HEMOGLOBIN g/dL 10.5* 10.4* 10.9*   HEMATOCRIT % 32.5* 32.5* 33.8*   PLATELETS 10*3/mm3 125* 142 166       Results from last 7 days  Lab Units 05/29/18  0350 05/25/18 2004   INR  1.67* 1.19*   APTT seconds  --  28.3      5/31/2018 18:55 6/1/2018 04:43   Magnesium 1.5 (L) 1.9       Assessment:    - a/c biventricular systolic heart failure  - EF 20-25% per ECHO 3-018  - NSVT  - hypomagnesia  - CAD   - (Permanent) atrial fibrillation    - h/o atrial flutter ablation 2013    - cor pulmonale / anasarca   - recurrent ascites - requiring frequent paracentesis  - CKD III   - (chronic) anemia  -  alcoholism  - Cirrhosis/chronic liver disease / ascites     Plan:  - a/c biventricular systolic heart failure -> cor pulmonale, anasarca, ascites. Clinically improved after Dobutamine gtt, IV Bumex and Coreg.  EF 20-25% per ECHO 3-2018. Mild NC/MR/AR, mild-mod TR  (No ACE-I or ARB due to CKD)    - NSVT - Once again associated with low Mg level. Will likely need daily dose MagOx at discharge    - hypomagnesia -  replaced per protocol    - CAD - No angina. Indeterminate troponin. H/o (-) stress test 3-2018.  BMS LAD, kissing balloon to adjacent diagonal,  BMS-> mid-distal RCA 1-2013. On  "Bumex, Coreg.  (No ACE-I or ARB due to CKD, no statin due to underlying liver disease)    - (Permanent) atrial fibrillation -> rate controlled with Coreg. Eliquis resumed. H/o atrial flutter ablation 2013     OK for dc from cardiac standpoint. Will need daily dose MagOx at discharge, continued fluid restriction, daily weight monitoring. Has been scheduled to see us in office on July 2 at noon        )6/1/2018  FRANK Mascorro/Transcription:   \"Dictated utilizing Dragon dictation\".     "

## 2018-06-01 NOTE — PROGRESS NOTES
Case Management Discharge Note    Final Note: Home    Destination     No service coordination in this encounter.      Durable Medical Equipment     No service coordination in this encounter.      Dialysis/Infusion     No service coordination in this encounter.      Home Medical Care     Service Request Status Selected Specialties Address Phone Number Fax Number    [facility] Declined  Pt declined HH - but took their number in case he needs it. N/A [address] [place] [address] [phone]      Social Care     No service coordination in this encounter.        Other: Other (per private vehicle)    Final Discharge Disposition Code: 01 - home or self-care

## 2018-06-01 NOTE — PROGRESS NOTES
"Daily progress note    Chief complaint  Doing better  No new complaints    History of present illness  68-year-old white male who is well-known to our service with systolic congestive heart failure cirrhosis ascites chronic kidney disease stage III anemia thrombocytopenia coronary artery disease chronic atrial fibrillation who continues to drink presented to [facility] emergency room with increased leg swelling shortness of breath weight gain for last 1 week.  Patient required paracentesis every 2 weeks.  Patient also has increased abdominal distention.  Patient denies any chest pain nausea vomiting diarrhea fever chills.  Patient evaluated found to be fluid overloaded with acute on chronic systolic CHF admitted for management     REVIEW OF SYSTEMS  Review of Systems   Constitutional: Negative for chills and fever.   HENT: Negative for congestion, rhinorrhea and sore throat.    Eyes: Negative for pain.   Respiratory: Positive for shortness of breath. Negative for cough.    Cardiovascular: Positive for leg swelling (BLE swelling). Negative for chest pain and palpitations.   Gastrointestinal: Negative for abdominal pain, diarrhea, nausea and vomiting.        Abd swelling   Endocrine: Negative.    Genitourinary: Negative for difficulty urinating and dysuria.   Musculoskeletal: Negative for myalgias.   Skin: Positive for wound (right elbow abrasion s/p fall).   Neurological: Negative for speech difficulty, weakness, numbness and headaches.   Psychiatric/Behavioral: Negative.    All other systems reviewed and are negative.     PHYSICAL EXAM  Blood pressure 104/72, pulse 54, temperature 98.3 °F (36.8 °C), temperature source Oral, resp. rate 18, height 180.3 cm (71\"), weight 82.6 kg (182 lb), SpO2 91 %.    Constitutional: He is oriented to person, place, and time. No distress.   Head: Normocephalic and atraumatic.   Mouth/Throat: Mucous membranes are normal.   Eyes: EOM are normal. Pupils are equal, round, and " reactive to light.   Neck: Normal range of motion. Neck supple.   Cardiovascular: Normal rate and normal heart sounds.  An irregularly irregular rhythm present.   Pulmonary/Chest: Effort normal and breath sounds normal. No respiratory distress. He has no decreased breath sounds. He has no wheezes. He has no rhonchi. He has no rales.   Abdominal: He exhibits distension, fluid wave and ascites. There is no tenderness. There is no rebound and no guarding.   Musculoskeletal: Normal range of motion. He exhibits edema (3+ BLE edema).   No bony right elbow tenderness, NV intact distally to RUE   Neurological: He is alert and oriented to person, place, and time. He has normal motor skills and normal sensation.   Skin: Skin is warm and dry. Abrasion (abrasion to posterior right elbow) noted.   Psychiatric: Mood and affect normal.     LAB RESULTS  Lab Results (last 24 hours)     Procedure Component Value Units Date/Time    Magnesium [[id number]]  (Normal) Collected:  06/01/18 0443    Specimen:  Blood Updated:  06/01/18 0646     Magnesium 1.9 mg/dL     Basic Metabolic Panel [[id number]]  (Abnormal) Collected:  06/01/18 0443    Specimen:  Blood Updated:  06/01/18 0544     Glucose 104 (H) mg/dL      BUN 32 (H) mg/dL      Creatinine 1.32 (H) mg/dL      Sodium 139 mmol/L      Potassium 3.9 mmol/L      Chloride 95 (L) mmol/L      CO2 32.6 (H) mmol/L      Calcium 7.5 (L) mg/dL      eGFR Non African Amer 54 (L) mL/min/1.73      BUN/Creatinine Ratio 24.2     Anion Gap 11.4 mmol/L     Narrative:       GFR Normal >60  Chronic Kidney Disease <60  Kidney Failure <15    BNP [[id number]]  (Abnormal) Collected:  06/01/18 0443    Specimen:  Blood Updated:  06/01/18 0543     proBNP 7,750.0 (H) pg/mL     Narrative:       Among patients with dyspnea, NT-proBNP is highly sensitive for the detection of acute congestive heart failure. In addition NT-proBNP of <300 pg/ml effectively rules out acute congestive heart failure with 99% negative predictive  value.    Magnesium [[id number]]  (Abnormal) Collected:  05/31/18 1855    Specimen:  Blood Updated:  05/31/18 1929     Magnesium 1.5 (L) mg/dL     Potassium [[id number]]  (Normal) Collected:  05/31/18 1855    Specimen:  Blood Updated:  05/31/18 1929     Potassium 4.4 mmol/L         Imaging Results (last 24 hours)     ** No results found for the last 24 hours. **        EKG:                             Rhythm/Rate: atrial fibrillation, rate= approximately 48  P waves and OR: irregular P waves, varying OR  QRS, axis: normal axis, anterior Q waves   ST and T waves: nonspecific T wave changes, no ST elevation or depression       Current Facility-Administered Medications:   •  allopurinol (ZYLOPRIM) tablet 100 mg, 100 mg, Oral, Daily, Gal Randall MD, 100 mg at 06/01/18 0842  •  apixaban (ELIQUIS) tablet 5 mg, 5 mg, Oral, Q12H, FRANK Moreno, 5 mg at 06/01/18 0842  •  bumetanide (BUMEX) tablet 1 mg, 1 mg, Oral, BID, FRANK Moreno, 1 mg at 06/01/18 0842  •  carvedilol (COREG) tablet 6.25 mg, 6.25 mg, Oral, Q12H, Joy Thompson APRNICOLAS, 6.25 mg at 06/01/18 0842  •  FLUoxetine (PROzac) capsule 20 mg, 20 mg, Oral, Daily, Gal Randall MD, 20 mg at 06/01/18 0842  •  folic acid (FOLVITE) tablet 1 mg, 1 mg, Oral, Daily, Gal Randall MD, 1 mg at 06/01/18 0842  •  ipratropium-albuterol (DUO-NEB) nebulizer solution 3 mL, 3 mL, Nebulization, Q4H PRN, Gal Randall MD  •  magnesium oxide (MAG-OX) tablet 400 mg, 400 mg, Oral, Daily, Zakiya Valverde APRN  •  multivitamin (THERAGRAN) tablet 1 tablet, 1 tablet, Oral, Daily, Gal Randall MD, 1 tablet at 06/01/18 0842  •  pantoprazole (PROTONIX) EC tablet 40 mg, 40 mg, Oral, Daily, Gal Randall MD, 40 mg at 06/01/18 0843  •  potassium chloride (MICRO-K) CR capsule 40 mEq, 40 mEq, Oral, BID With Meals, Gal Randall MD, 40 mEq at 06/01/18 0842  •  Insert peripheral IV, , , Once **AND** sodium chloride 0.9 % flush 10 mL, 10 mL, Intravenous, PRN, Sigifredo Waite MD  •  thiamine (VITAMIN  B-1) tablet 100 mg, 100 mg, Oral, Daily, Victor Manuel Randall MD, 100 mg at 06/01/18 0842     ASSESSMENT  Acute on chronic systolic congestive heart failure  Fluid overload  Cirrhosis with ascites status post paracentesis  Anasarca  Chronic kidney disease stage III  Coronary artery disease  Chronic atrial fibrillation  Alcohol abuse    PLAN  Discharge home  Discharge summary dictated    VICTOR MANUEL RANDALL MD

## 2018-06-01 NOTE — NURSING NOTE
Patient has been encouraged to urinate in urinal so that urine can be measured accurately but still voids into commode-so unable to obtain accurate intake and output.

## 2018-06-01 NOTE — PLAN OF CARE
Problem: Patient Care Overview  Goal: Plan of Care Review  Outcome: Ongoing (interventions implemented as appropriate)   06/01/18 6848   Coping/Psychosocial   Plan of Care Reviewed With patient   Plan of Care Review   Progress improving   OTHER   Outcome Summary Mag level 1.5/ MD called and APRN ordered 2 runs of Mag/will check am labs/up in room/hr 50-60's tonight/15 beat run/asymtomatic/see strips on chart/voids into commode/unable to measure/abd large and distended/still some pedal edema/frustrated with seeming lack of progress/CIWA=0/to start po Bumex in am/will continue to monitor       Problem: Fluid Volume Excess (Adult)  Goal: Optimal Fluid Balance  Outcome: Ongoing (interventions implemented as appropriate)      Problem: Cardiac: Heart Failure (Adult)  Goal: Signs and Symptoms of Listed Potential Problems Will be Absent, Minimized or Managed (Cardiac: Heart Failure)  Outcome: Ongoing (interventions implemented as appropriate)      Problem: Skin Injury Risk (Adult)  Goal: Skin Health and Integrity  Outcome: Ongoing (interventions implemented as appropriate)

## 2018-06-01 NOTE — DISCHARGE INSTR - APPOINTMENTS
APPT WITH  PCP GEENA PHILLIPS SCHEDULED FOR Monday, June 4, 2018 AT 3:00 PM    [phone]    S.VARSHA /MT

## 2018-06-05 ENCOUNTER — TELEPHONE (OUTPATIENT)
Dept: GASTROENTEROLOGY | Facility: CLINIC | Age: 68
End: 2018-06-05

## 2018-06-05 NOTE — TELEPHONE ENCOUNTER
Call from spouse, Skye (see HIPAA auth of 4/4/18).  Reports pt d/c from [facility] 5/25.  Has had f/u appt with PCP,  Dr Long.  Hospice has been ordered.     Skye [facility] wants to notify DR Benavides of this, and therefore will not be following with this office any further.

## 2018-08-10 ENCOUNTER — TRANSCRIBE ORDERS (OUTPATIENT)
Dept: ADMINISTRATIVE | Facility: HOSPITAL | Age: 68
End: 2018-08-10

## 2018-08-10 DIAGNOSIS — R18.8 OTHER ASCITES: Primary | ICD-10-CM

## 2018-08-13 ENCOUNTER — APPOINTMENT (OUTPATIENT)
Dept: RADIOLOGY | Facility: HOSPITAL | Age: 68
End: 2018-08-13

## 2018-08-13 ENCOUNTER — HOSPITAL ENCOUNTER (OUTPATIENT)
Dept: ULTRASOUND IMAGING | Facility: HOSPITAL | Age: 68
Discharge: HOME | End: 2018-08-13
Admitting: NURSE PRACTITIONER

## 2018-08-13 VITALS
OXYGEN SATURATION: 99 % | HEIGHT: 71 IN | SYSTOLIC BLOOD PRESSURE: 120 MMHG | HEART RATE: 78 BPM | RESPIRATION RATE: 16 BRPM | BODY MASS INDEX: 30.8 KG/M2 | TEMPERATURE: 97.4 F | WEIGHT: 220 LBS | DIASTOLIC BLOOD PRESSURE: 73 MMHG

## 2018-08-13 DIAGNOSIS — R18.8 OTHER ASCITES: ICD-10-CM

## 2018-08-13 LAB
INR PPP: 1.2 (ref 0.8–1.2)
PROTHROMBIN TIME: 14.8 SECONDS (ref 12.8–15.2)

## 2018-08-13 PROCEDURE — 85610 PROTHROMBIN TIME: CPT

## 2018-08-13 PROCEDURE — 76942 ECHO GUIDE FOR BIOPSY: CPT

## 2018-08-13 RX ORDER — CARVEDILOL 6.25 MG/1
TABLET ORAL 2 TIMES DAILY WITH MEALS
COMMUNITY

## 2018-08-13 RX ORDER — FOLIC ACID 1 MG/1
1 TABLET ORAL DAILY
COMMUNITY

## 2018-08-13 RX ORDER — LIDOCAINE HYDROCHLORIDE 10 MG/ML
20 INJECTION, SOLUTION INFILTRATION; PERINEURAL ONCE
Status: COMPLETED | OUTPATIENT
Start: 2018-08-13 | End: 2018-08-13

## 2018-08-13 RX ORDER — ALLOPURINOL 100 MG/1
TABLET ORAL DAILY
COMMUNITY
Start: 2018-06-02

## 2018-08-13 RX ORDER — POTASSIUM CHLORIDE 750 MG/1
10 TABLET, FILM COATED, EXTENDED RELEASE ORAL 2 TIMES DAILY
COMMUNITY

## 2018-08-13 RX ORDER — MAGNESIUM OXIDE 400 MG/1
400 TABLET ORAL DAILY
COMMUNITY

## 2018-08-13 RX ADMIN — LIDOCAINE HYDROCHLORIDE 10 ML: 10 INJECTION, SOLUTION INFILTRATION; PERINEURAL at 11:45

## 2018-08-13 NOTE — NURSING NOTE
Verbal and written D/C instructions given to patient and brother.  They voice understanding and are able to teach back D/C instructions.

## 2018-08-13 NOTE — DISCHARGE INSTRUCTIONS
EDUCATION /DISCHARGE INSTRUCTIONS  Paracentesis:  A needle is inserted into the space between your abdominal organs and the membrane that surrounds them (peritoneal space).  It is done for the diagnosis and treatment of fluid that is resistant to other therapies.  It helps determine the cause of the fluid and at the relieves pressure created by the fluid.  A sample is obtained and sent to the laboratory for study.    During the procedure:  You will lie on a bed on your back with your legs drawn up.  Your abdomen will be exposed from the chest to the pelvis.  You will otherwise be covered to maintain comfort.  A physician will clean your abdomen with antiseptic soap, place a sterile towel around the site and administer a local anesthetic to numb the area.  The physician will insert a needle into your abdominal wall.  There may be a popping sound which signifies the needle has pierced the abdominal wall. Next, the physician will attach tubing to transfer a sample into a collection bottle.  After the fluid is obtained the needle will be removed.  A pressure dressing is applied to the site.    Risks of the procedure include but are not limited to:   *  Bleeding    *  Wound infection   *  Low blood pressure   *  Decreased urination   *  Low sodium if a large amount of fluid is removed   *  Puncture of abdominal organs by the needle    Benefits of the procedure:  Benefits include the removal of fluid from the abdomen, relief of abdominal pressure and facilitation of a diagnosis.    Alternatives to the procedure:  Possible alternatives are diuretic drug therapy or surgery to place a shunt to drain fluid.  Risks of diuretic drug therapy include possible dehydration and renal failure.  The benefit of drug therapy is that it can be done at home under physician supervision.  Risks of shunt placement include exposure to anesthesia, infection, excessive bleeding and injury to abdominal organs.  The benefit of a shunt is that it  can be used to drain fluid over a longer period of time.  THIS EDUCATION INFORMATION WAS REVIEWED PRIOR TO THE PROCEDURE AND CONSENT. Patient initials__________________Time___1118______________    Post procedure:    *  Weigh yourself daily.   *  Follow your doctors dietary instructions.   *  Rest today (no pushing pulling, straining or heavy lifting).   *  Slowly increase activity tomorow.   *  If you received sedation do not drive for 24 hours.              * Skin affix applied to puncture site. Do not try to remove, scratch or apply lotion   * Skin affix will fall off on it's own   *  You may shower tomorrow    Call your doctor if experiencing:   *  Signs of infection such as redness, swelling, excessive pain and / or foul       smelling drainage from the puncture site.   *  Chills or fever over 101 degrees (by mouth).   *  Fainting.   *  Rapid weight gain / loss.   *  Unrelieved pain.   *  Any new or severe symptoms.    Following the procedure:      Follow-up with the ordering physician as directed.   Continue to take other medications as directed by your physician unless    otherwise instructed.   If applicable, resume taking your blood thinners or Aspirin in 24 hours.  Resume Eliquis (Apixaban) on 8/14/18 the evening dose.    If you have any concerns please call the Radiology Nurses Desk at [phone].  You are the most important factor in your recovery.  Follow the above instructions carefully.

## 2018-08-13 NOTE — H&P
Name: Anibal Freedman ADMIT: 2018   : 1950  PCP: Moustapha Long MD    MRN: [id number] LOS: 0 days   AGE/SEX: 68 y.o. male  ROOM: Room/bed info not found       Chief complaint   Patient is a 68 y.o. male presents for paracentesis  Past Surgical History:  Past Surgical History:   Procedure Laterality Date   • APPENDECTOMY     • CARDIAC ABLATION Right 2013    Atrial Flutter Ablation, Dr. Otis Srinivasan   • CARDIAC CATHETERIZATION N/A 2016    Procedure: Left Heart Cath   ?right cath too;  Surgeon: Epifanio May MD;  [facility];  Service:    • CARDIAC CATHETERIZATION N/A 2016    Procedure: Coronary angiography;  Surgeon: Epifanio May MD;  [facility];  Service:    • CARDIAC CATHETERIZATION N/A 2016    Procedure: Left ventriculography;  Surgeon: Epifanio May MD;  [facility];  Service:    • CARDIAC CATHETERIZATION N/A 2016    Procedure: Right Heart Cath;  Surgeon: Epifanio May MD;  Location:  [facility] CATH INVASIVE LOCATION;  Service:    • COLONOSCOPY N/A 3/15/2017    Procedure: COLONOSCOPY TO CECUM WITH COLD BIOPSY POLYECTOMY;  Surgeon: Anibal Bower MD;  Location: [facility] ENDOSCOPY;  Service:    • COLONOSCOPY W/ BIOPSIES AND POLYPECTOMY N/A 2006    Colonoscopy w/ snare cautery, polypectomy & biopsy, PATH:  Sigmoid Colon Biopsy: focal vascular congestion & evidence of recent hemorrhage, non-specific.  Recto-Sigmoid Colon Biopsy: Fragments of tubular adenoam w/ mild dysplasia. Dr. Mildred You   • CORONARY ANGIOPLASTY WITH STENT PLACEMENT      x 2    • CYSTOSCOPY N/A 2010    Cystoscopy w/ transurethral incision of the bladder neck, Dr. ANDREW Cordova   • CYSTOTOMY N/A 2011    Laparoscopic closure of incidental cystotomy, Laparoscopic incision of pelvic lymphocele, Dr. Jaime Royal   • JOINT REPLACEMENT     • LEG DEBRIDEMENT Left  08/19/2005    Left Thigh, Debridement skin & subcutaneous tissue muscle w/ closure via advancement flaps, Dr. Mildred You   • LEG DEBRIDEMENT Left 08/08/2005    Debridement of left anterior thigh, Dr. Mildred You   • PROSTATECTOMY N/A 04/28/2010    Adenocarcinoma, Primary [place] Grade 3, Secondar [place] Grade 3. Combined yissel score 6. Tumor involves rt & lt lobes, negative capsular margin, no invasion of seminal vesicles, no identified perineural invastion, Dr. Jaime Royal   • SKIN BIOPSY     • TONSILLECTOMY     • TOTAL KNEE ARTHROPLASTY Left 03/25/2008    Left total knee arthroplasty w/ [employer] pinless computer navigation, Dr. Ashok Crocker   • WOUND DEBRIDEMENT Left 08/02/2005    Left Buttocks & Left thigh wounds, Debridement of left buttocks & left thigh wounds, Dr. Mildred You       Past Medical History:  Past Medical History:   Diagnosis Date   • Alcoholism (CMS/HCC)    • Arthritis    • Atrial fibrillation (CMS/HCC)     pt reported   • Cellulitis    • CHF (congestive heart failure) (CMS/HCC)     right side   • Colon polyps 08/21/2006    Colonoscopy w/ snare cautery, polypectomy & biopsy, PATH:  Sigmoid Colon Biopsy: focal vascular congestion & evidence of recent hemorrhage, non-specific.  Recto-Sigmoid Colon Biopsy: Fragments of tubular adenoam w/ mild dysplasia. Dr. Mildred You   • DM2 (diabetes mellitus, type 2) (CMS/HCC) 3/7/2018   • History of coronary angioplasty    • History of MRSA infection 2005    pt reported   • History of staph infection 08/02/2005   • Hypertension    • Infectious viral hepatitis     pt reported   • Prostate cancer (CMS/HCC) 04/28/2010    S/P Radical Prostectomy, Adenocarcinoma, Primary [place] Grade 3, Secondar [place] Grade 3. Combined yissel score 6. Tumor involves rt & lt lobes, negative capsular margin, no invasion of seminal vesicles, no identified perineural invastion   • Withdrawal symptoms, alcohol (CMS/HCC)        Home Medications:    (Not in a hospital  admission)    Allergies:  Patient has no known allergies.    Family History:  Family History   Problem Relation Age of Onset   • Heart disease Mother    • Alcohol abuse Father    • Liver cancer Father    • Cancer Sister    • Hypertension Brother    • Alcohol abuse Brother    • Skin cancer Brother        Social History:  Social History   Substance Use Topics   • Smoking status: Former Smoker     Packs/day: 1.50     Quit date: 3/15/2007   • Smokeless tobacco: Not on file   • Alcohol use 3.6 oz/week     6 Cans of beer per week      Comment: 1 pint a day OR MORE OF VODKA        Objective     Physical Exam:   ascites    Vital Signs  Temp:  [97.4 °F (36.3 °C)] 97.4 °F (36.3 °C)  Heart Rate:  [76] 76  Resp:  [18] 18  BP: (147)/(82) 147/82    Anticipated Surgical Procedure:  paracentesis    The risks, benefits and alternatives of this procedure have been discussed with the patient or responsible party: Yes        Zachery Ngo MD  08/13/18  11:43 AM

## 2018-08-14 ENCOUNTER — TELEPHONE (OUTPATIENT)
Dept: INTERVENTIONAL RADIOLOGY/VASCULAR | Facility: HOSPITAL | Age: 68
End: 2018-08-14

## 2018-09-12 ENCOUNTER — TRANSCRIBE ORDERS (OUTPATIENT)
Dept: ADMINISTRATIVE | Facility: HOSPITAL | Age: 68
End: 2018-09-12

## 2018-09-12 DIAGNOSIS — R18.8 OTHER ASCITES: Primary | ICD-10-CM

## 2018-09-17 ENCOUNTER — HOSPITAL ENCOUNTER (OUTPATIENT)
Dept: ULTRASOUND IMAGING | Facility: HOSPITAL | Age: 68
Discharge: HOME | End: 2018-09-17
Admitting: RADIOLOGY

## 2018-09-17 VITALS
OXYGEN SATURATION: 98 % | RESPIRATION RATE: 16 BRPM | WEIGHT: 215 LBS | SYSTOLIC BLOOD PRESSURE: 134 MMHG | DIASTOLIC BLOOD PRESSURE: 84 MMHG | TEMPERATURE: 97.7 F | BODY MASS INDEX: 30.1 KG/M2 | HEIGHT: 71 IN | HEART RATE: 96 BPM

## 2018-09-17 DIAGNOSIS — R18.8 OTHER ASCITES: ICD-10-CM

## 2018-09-17 LAB
INR PPP: 1.4 (ref 0.8–1.2)
PROTHROMBIN TIME: 16.6 SECONDS (ref 12.8–15.2)

## 2018-09-17 PROCEDURE — 85610 PROTHROMBIN TIME: CPT

## 2018-09-17 PROCEDURE — 76942 ECHO GUIDE FOR BIOPSY: CPT

## 2018-09-17 RX ORDER — LIDOCAINE HYDROCHLORIDE 10 MG/ML
20 INJECTION, SOLUTION INFILTRATION; PERINEURAL ONCE
Status: COMPLETED | OUTPATIENT
Start: 2018-09-17 | End: 2018-09-17

## 2018-09-17 RX ADMIN — LIDOCAINE HYDROCHLORIDE 15 ML: 10 INJECTION, SOLUTION INFILTRATION; PERINEURAL at 11:52

## 2018-09-17 NOTE — NURSING NOTE
Returned from ultrasound. Puncture site to lower left  abdomen dry and intact with pigtail drain noted and capped.No complaints of pain. No distress.Lunch served.

## 2018-09-17 NOTE — DISCHARGE INSTRUCTIONS
EDUCATION /DISCHARGE INSTRUCTIONS  Paracentesis:  A needle is inserted into the space between your abdominal organs and the membrane that surrounds them (peritoneal space).  It is done for the diagnosis and treatment of fluid that is resistant to other therapies.  It helps determine the cause of the fluid and at the relieves pressure created by the fluid.  A sample is obtained and sent to the laboratory for study.    During the procedure:  You will lie on a bed on your back with your legs drawn up.  Your abdomen will be exposed from the chest to the pelvis.  You will otherwise be covered to maintain comfort.  A physician will clean your abdomen with antiseptic soap, place a sterile towel around the site and administer a local anesthetic to numb the area.  The physician will insert a needle into your abdominal wall.  There may be a popping sound which signifies the needle has pierced the abdominal wall. Next, the physician will attach tubing to transfer a sample into a collection bottle.  After the fluid is obtained the needle will be removed.  A pressure dressing is applied to the site.    Risks of the procedure include but are not limited to:   *  Bleeding    *  Wound infection   *  Low blood pressure   *  Decreased urination   *  Low sodium if a large amount of fluid is removed   *  Puncture of abdominal organs by the needle    Benefits of the procedure:  Benefits include the removal of fluid from the abdomen, relief of abdominal pressure and facilitation of a diagnosis.    Alternatives to the procedure:  Possible alternatives are diuretic drug therapy or surgery to place a shunt to drain fluid.  Risks of diuretic drug therapy include possible dehydration and renal failure.  The benefit of drug therapy is that it can be done at home under physician supervision.  Risks of shunt placement include exposure to anesthesia, infection, excessive bleeding and injury to abdominal organs.  The benefit of a shunt is that it  can be used to drain fluid over a longer period of time.  THIS EDUCATION INFORMATION WAS REVIEWED PRIOR TO THE PROCEDURE AND CONSENT. Patient initials__________________Time_________________    Post procedure:    *  Weigh yourself daily.   *  Follow your doctors dietary instructions.   *  Rest today (no pushing pulling, straining or heavy lifting).   *  Slowly increase activity tomorow.   *  If you received sedation do not drive for 24 hours.              * Skin affix applied to puncture site. Do not try to remove, scratch or apply lotion   * Skin affix will fall off on it's own   *  You may shower tomorrow    Call your doctor if experiencing:   *  Signs of infection such as redness, swelling, excessive pain and / or foul       smelling drainage from the puncture site.   *  Chills or fever over 101 degrees (by mouth).   *  Fainting.   *  Rapid weight gain / loss.   *  Unrelieved pain.   *  Any new or severe symptoms.    Following the procedure:      Follow-up with the ordering physician as directed.   Continue to take other medications as directed by your physician unless    otherwise instructed.   If applicable, resume taking your blood  Eliquis or Aspirin on Tuesday September 18, 2018 after 12 noon    If you have any concerns please call the Radiology Nurses Desk at [phone].  You are the most important factor in your recovery.  Follow the above instructions carefully.

## 2018-09-17 NOTE — NURSING NOTE
D/C instructions discussed and understood by patient. Puncture site dry and intact. No distress.

## 2018-09-17 NOTE — H&P
Name: Anibal Freedman ADMIT: 2018   : 1950  PCP: Moustapha Long MD    MRN: [id number] LOS: 0 days   AGE/SEX: 68 y.o. male  ROOM: Room/bed info not found       Chief complaint   Patient is a 68 y.o. male presents with ascites.     Past Surgical History:  Past Surgical History:   Procedure Laterality Date   • APPENDECTOMY     • CARDIAC ABLATION Right 2013    Atrial Flutter Ablation, Dr. Otis Srinivasan   • CARDIAC CATHETERIZATION N/A 2016    Procedure: Left Heart Cath   ?right cath too;  Surgeon: Epifanio May MD;  [facility];  Service:    • CARDIAC CATHETERIZATION N/A 2016    Procedure: Coronary angiography;  Surgeon: Epifanio May MD;  [facility];  Service:    • CARDIAC CATHETERIZATION N/A 2016    Procedure: Left ventriculography;  Surgeon: Epifanio May MD;  [facility];  Service:    • CARDIAC CATHETERIZATION N/A 2016    Procedure: Right Heart Cath;  Surgeon: Epifanio May MD;  [facility];  Service:    • COLONOSCOPY N/A 3/15/2017    Procedure: COLONOSCOPY TO CECUM WITH COLD BIOPSY POLYECTOMY;  Surgeon: Anibal Bower MD;  Location: [facility] ENDOSCOPY;  Service:    • COLONOSCOPY W/ BIOPSIES AND POLYPECTOMY N/A 2006    Colonoscopy w/ snare cautery, polypectomy & biopsy, PATH:  Sigmoid Colon Biopsy: focal vascular congestion & evidence of recent hemorrhage, non-specific.  Recto-Sigmoid Colon Biopsy: Fragments of tubular adenoam w/ mild dysplasia. Dr. Mildred You   • CORONARY ANGIOPLASTY WITH STENT PLACEMENT      x 2    • CYSTOSCOPY N/A 2010    Cystoscopy w/ transurethral incision of the bladder neck, Dr. ANDREW Cordova   • CYSTOTOMY N/A 2011    Laparoscopic closure of incidental cystotomy, Laparoscopic incision of pelvic lymphocele, Dr. Jaime Royal   • JOINT REPLACEMENT     • LEG DEBRIDEMENT Left  08/19/2005    Left Thigh, Debridement skin & subcutaneous tissue muscle w/ closure via advancement flaps, Dr. Mildred You   • LEG DEBRIDEMENT Left 08/08/2005    Debridement of left anterior thigh, Dr. Mildred You   • PROSTATECTOMY N/A 04/28/2010    Adenocarcinoma, Primary [place] Grade 3, Secondar [place] Grade 3. Combined yissel score 6. Tumor involves rt & lt lobes, negative capsular margin, no invasion of seminal vesicles, no identified perineural invastion, Dr. Jaime Royal   • SKIN BIOPSY     • TONSILLECTOMY     • TOTAL KNEE ARTHROPLASTY Left 03/25/2008    Left total knee arthroplasty w/ [place] pinless computer navigation, Dr. Ashok Crocker   • WOUND DEBRIDEMENT Left 08/02/2005    Left Buttocks & Left thigh wounds, Debridement of left buttocks & left thigh wounds, Dr. Mildred You       Past Medical History:  Past Medical History:   Diagnosis Date   • Alcoholism (CMS/HCC)    • Arthritis    • Atrial fibrillation (CMS/HCC)     pt reported   • Cellulitis    • CHF (congestive heart failure) (CMS/HCC)     right side   • Colon polyps 08/21/2006    Colonoscopy w/ snare cautery, polypectomy & biopsy, PATH:  Sigmoid Colon Biopsy: focal vascular congestion & evidence of recent hemorrhage, non-specific.  Recto-Sigmoid Colon Biopsy: Fragments of tubular adenoam w/ mild dysplasia. Dr. Mildred You   • DM2 (diabetes mellitus, type 2) (CMS/HCC) 3/7/2018   • History of coronary angioplasty    • History of MRSA infection 2005    pt reported   • History of staph infection 08/02/2005   • Hypertension    • Infectious viral hepatitis     pt reported   • Prostate cancer (CMS/HCC) 04/28/2010    S/P Radical Prostectomy, Adenocarcinoma, Primary [place] Grade 3, Secondar [place] Grade 3. Combined yissel score 6. Tumor involves rt & lt lobes, negative capsular margin, no invasion of seminal vesicles, no identified perineural invastion   • Withdrawal symptoms, alcohol (CMS/HCC)        Home Medications:    (Not in a hospital  admission)    Allergies:  Patient has no known allergies.    Family History:  Family History   Problem Relation Age of Onset   • Heart disease Mother    • Alcohol abuse Father    • Liver cancer Father    • Cancer Sister    • Hypertension Brother    • Alcohol abuse Brother    • Skin cancer Brother        Social History:  Social History   Substance Use Topics   • Smoking status: Former Smoker     Packs/day: 1.50     Quit date: 3/15/2007   • Smokeless tobacco: Not on file   • Alcohol use 3.6 oz/week     6 Cans of beer per week      Comment: 1 pint a day OR MORE OF VODKA        Objective     Physical Exam:   ascites    Vital Signs  Temp:  [97.7 °F (36.5 °C)] 97.7 °F (36.5 °C)  Heart Rate:  [80] 80  Resp:  [16] 16  BP: (141)/(93) 141/93    Anticipated Surgical Procedure:  paracentesis    The risks, benefits and alternatives of this procedure have been discussed with the patient or responsible party: Yes        Zachery Ngo MD  09/17/18  10:36 AM

## 2018-09-18 ENCOUNTER — TELEPHONE (OUTPATIENT)
Dept: INTERVENTIONAL RADIOLOGY/VASCULAR | Facility: HOSPITAL | Age: 68
End: 2018-09-18

## (undated) DEVICE — RESUSCITATOR,MANUAL,ADLT,MASK,TUBE RES: Brand: MEDLINE INDUSTRIES, INC.

## (undated) DEVICE — THE TORRENT IRRIGATION SCOPE CONNECTOR IS USED WITH THE TORRENT IRRIGATION TUBING TO PROVIDE IRRIGATION FLUIDS SUCH AS STERILE WATER DURING GASTROINTESTINAL ENDOSCOPIC PROCEDURES WHEN USED IN CONJUNCTION WITH AN IRRIGATION PUMP (OR ELECTROSURGICAL UNIT).: Brand: TORRENT

## (undated) DEVICE — TUBING, SUCTION, 1/4" X 10', STRAIGHT: Brand: MEDLINE

## (undated) DEVICE — Device: Brand: DEFENDO AIR/WATER/SUCTION AND BIOPSY VALVE

## (undated) DEVICE — CANN NASL CO2 TRULINK W/O2 A/

## (undated) DEVICE — SINGLE-USE BIOPSY FORCEPS: Brand: RADIAL JAW 4